# Patient Record
Sex: MALE | Race: WHITE | NOT HISPANIC OR LATINO | Employment: FULL TIME | URBAN - METROPOLITAN AREA
[De-identification: names, ages, dates, MRNs, and addresses within clinical notes are randomized per-mention and may not be internally consistent; named-entity substitution may affect disease eponyms.]

---

## 2017-01-18 ENCOUNTER — MYC MEDICAL ADVICE (OUTPATIENT)
Dept: FAMILY MEDICINE | Facility: CLINIC | Age: 32
End: 2017-01-18

## 2017-01-18 ENCOUNTER — OFFICE VISIT (OUTPATIENT)
Dept: FAMILY MEDICINE | Facility: CLINIC | Age: 32
End: 2017-01-18
Payer: COMMERCIAL

## 2017-01-18 ENCOUNTER — RADIANT APPOINTMENT (OUTPATIENT)
Dept: GENERAL RADIOLOGY | Facility: CLINIC | Age: 32
End: 2017-01-18
Attending: FAMILY MEDICINE
Payer: COMMERCIAL

## 2017-01-18 VITALS
HEART RATE: 91 BPM | RESPIRATION RATE: 14 BRPM | TEMPERATURE: 98.8 F | SYSTOLIC BLOOD PRESSURE: 128 MMHG | BODY MASS INDEX: 36.44 KG/M2 | WEIGHT: 269 LBS | DIASTOLIC BLOOD PRESSURE: 78 MMHG | OXYGEN SATURATION: 99 % | HEIGHT: 72 IN

## 2017-01-18 DIAGNOSIS — M25.531 RIGHT WRIST PAIN: ICD-10-CM

## 2017-01-18 DIAGNOSIS — Z23 NEED FOR INFLUENZA VACCINATION: Primary | ICD-10-CM

## 2017-01-18 PROCEDURE — 99214 OFFICE O/P EST MOD 30 MIN: CPT | Performed by: FAMILY MEDICINE

## 2017-01-18 PROCEDURE — 73110 X-RAY EXAM OF WRIST: CPT | Mod: RT

## 2017-01-18 NOTE — NURSING NOTE
Chief Complaint   Patient presents with     Musculoskeletal Problem     RIGHT WRIST PAIN       Initial /78 mmHg  Pulse 91  Temp(Src) 98.8  F (37.1  C) (Oral)  Resp 14  Ht 6' (1.829 m)  Wt 269 lb (122.018 kg)  BMI 36.48 kg/m2  SpO2 99% Estimated body mass index is 36.48 kg/(m^2) as calculated from the following:    Height as of this encounter: 6' (1.829 m).    Weight as of this encounter: 269 lb (122.018 kg).  BP completed using cuff size: large    Health Maintenance that is potentially due pending provider review:  Health Maintenance Due   Topic Date Due     INFLUENZA VACCINE (SYSTEM ASSIGNED)  09/01/2016         Vaccine today-ordered

## 2017-01-18 NOTE — PROGRESS NOTES
SUBJECTIVE:                                                    Eliezer Ellis is a 31 year old male who presents to clinic today for the following health issues:      Musculoskeletal problem/pain      Duration: started on Sat-1-14-17    Description  Location: right side of right wrist    Intensity:  Moderate in certain position    Accompanying signs and symptoms: tingling and swelling    History  Previous similar problem: no   Previous evaluation:  none    Precipitating or alleviating factors:  Trauma or overuse: YES- fall on 1-14-17 and then re-injured unloading firewood from truck on Mon. 1-16-17.  Aggravating factors include: lifting and turning sideways (opening jar, turning car key, zippering jacket, worse with resistance or weight bearing)    Therapies tried and outcome: rest/inactivity, ice, support wrap and NSAID - Ibuprofen           Problem list and histories reviewed & adjusted, as indicated.  Additional history: as documented    Patient Active Problem List   Diagnosis     Seasonal allergies     Onychomycosis     Obesity     CARDIOVASCULAR SCREENING; LDL GOAL LESS THAN 160     Prehypertension     Anxiety attack     Anxiety     Mixed hyperlipidemia     History reviewed. No pertinent past surgical history.    Social History   Substance Use Topics     Smoking status: Never Smoker      Smokeless tobacco: Never Used     Alcohol Use: 0.0 oz/week     0 Standard drinks or equivalent per week      Comment: 2-3x week     Family History   Problem Relation Age of Onset     Blood Disease Father 65     VTE, on coumadin     Family History Negative Mother          Current Outpatient Prescriptions   Medication Sig Dispense Refill     B Complex Vitamins (VITAMIN B COMPLEX PO) Take by mouth daily       Multiple Vitamins-Minerals (MULTIVITAMIN ADULT PO) Take by mouth daily       ALPRAZolam (XANAX) 1 MG tablet Take 1 tablet (1 mg) by mouth once as needed for anxiety 15 tablet 0     TGT PSYLLIUM FIBER PO Take by mouth daily 5  tablets       VITAMIN D PO Take  by mouth.       cetirizine (ZYRTEC) 10 MG tablet Take 10 mg by mouth daily.       venlafaxine (EFFEXOR-XR) 75 MG 24 hr capsule Take 1 per day for 1-2 weeks, then increase to 2 per day as tolerated 60 capsule 5     Allergies   Allergen Reactions     Penicillins      Recent Labs   Lab Test  08/29/16   1126  05/11/16   1511  01/10/14   1027  11/01/13   1109   LDL   --    --   159*  186*   HDL   --    --   34*  37*   TRIG   --    --   283*  151*   ALT  45  57   --    --    CR  0.95  1.03   --    --    GFRESTIMATED  >90  Non  GFR Calc    83   --    --    GFRESTBLACK  >90   GFR Calc    >90   GFR Calc     --    --    POTASSIUM  4.3  4.3   --    --    TSH  6.17*  7.81*   --    --       BP Readings from Last 3 Encounters:   01/18/17 128/78   08/29/16 124/82   06/15/16 128/80    Wt Readings from Last 3 Encounters:   01/18/17 269 lb (122.018 kg)   08/29/16 256 lb 8 oz (116.348 kg)   06/15/16 257 lb 8 oz (116.801 kg)                  Labs reviewed in EPIC  Problem list, Medication list, Allergies, and Medical/Social/Surgical histories reviewed in [facility] and updated as appropriate.    ROS:  Constitutional, HEENT, cardiovascular, pulmonary, GI, , musculoskeletal, neuro, skin, endocrine and psych systems are negative, except as otherwise noted.    OBJECTIVE:                                                    /78 mmHg  Pulse 91  Temp(Src) 98.8  F (37.1  C) (Oral)  Resp 14  Ht 6' (1.829 m)  Wt 269 lb (122.018 kg)  BMI 36.48 kg/m2  SpO2 99%  Body mass index is 36.48 kg/(m^2).  GENERAL: healthy, alert and no distress  ABDOMEN: soft, nontender, no hepatosplenomegaly, no masses and bowel sounds normal  MS: no gross musculoskeletal defects noted, no edema EXCEPT there is some edema in the right wrist , no bruises , no erythema and full ROM , sensation and pulses are equal and good concha     Diagnostic Test Results:  Xray - right wrsit       ASSESSMENT/PLAN:                                                        1. Right wrist pain  I do not see any fractures per my reading of the X ray, discussed with the pt to rest, ice a few x a day for 15 min time , NSAIDs for the pain, also avoid strenuous activities with that hand/arm/wrist, use a wrist brace during the day ,   - XR Wrist Right G/E 3 Views; Future  RTC if no improving or worsening.      Noreen Ramos MD  [facility]  '  HPI      ROS      Physical Exam

## 2017-01-21 ENCOUNTER — RADIANT APPOINTMENT (OUTPATIENT)
Dept: GENERAL RADIOLOGY | Facility: CLINIC | Age: 32
End: 2017-01-21
Attending: FAMILY MEDICINE
Payer: COMMERCIAL

## 2017-01-21 ENCOUNTER — OFFICE VISIT (OUTPATIENT)
Dept: URGENT CARE | Facility: URGENT CARE | Age: 32
End: 2017-01-21
Payer: COMMERCIAL

## 2017-01-21 VITALS
OXYGEN SATURATION: 99 % | SYSTOLIC BLOOD PRESSURE: 126 MMHG | HEIGHT: 72 IN | HEART RATE: 78 BPM | WEIGHT: 260 LBS | BODY MASS INDEX: 35.21 KG/M2 | TEMPERATURE: 98.1 F | DIASTOLIC BLOOD PRESSURE: 83 MMHG

## 2017-01-21 DIAGNOSIS — M10.9 ACUTE GOUTY ARTHROPATHY: ICD-10-CM

## 2017-01-21 DIAGNOSIS — S99.921A FOOT INJURY, RIGHT, INITIAL ENCOUNTER: Primary | ICD-10-CM

## 2017-01-21 DIAGNOSIS — S99.921A FOOT INJURY, RIGHT, INITIAL ENCOUNTER: ICD-10-CM

## 2017-01-21 PROCEDURE — 73630 X-RAY EXAM OF FOOT: CPT | Mod: RT

## 2017-01-21 PROCEDURE — 99213 OFFICE O/P EST LOW 20 MIN: CPT | Performed by: FAMILY MEDICINE

## 2017-01-21 RX ORDER — INDOMETHACIN 25 MG/1
50 CAPSULE ORAL
Qty: 40 CAPSULE | Refills: 1 | Status: SHIPPED | OUTPATIENT
Start: 2017-01-21 | End: 2018-03-15

## 2017-01-21 NOTE — MR AVS SNAPSHOT
After Visit Summary   1/21/2017    Eliezer Ellis    MRN: [id number]           Patient Information     Date Of Birth          1985        Visit Information        Provider Department      1/21/2017 2:00 PM Josi Mehta MD [facility]        Today's Diagnoses     Foot injury, right, initial encounter    -  1     Acute gouty arthropathy           Care Instructions      Eating to Prevent Gout  Gout is a painful form of arthritis caused by an excess of uric acid. This is a waste product made by the body. It builds up in the body and forms crystals that collect in the joints, bringing on a gout attack. Alcohol and certain foods can trigger a gout attack. Below are some guidelines for changing your diet to help you manage gout. Your healthcare provider can work with you to determine the best eating plan for you. Know that diet is only one part of managing gout. Take your medicines as prescribed and follow the other guidelines your healthcare provider has given you.  Foods to limit  Eating too many foods containing purines may increase the levels of uric acid in your body and increase your risk for a gout attack. It may be best to limit these high-purine foods:    Alcohol (beer, red wine). You may be told to avoid alcohol completely.    Certain fish (anchovies, sardines, fish roes, herring, tuna, mussels, codfish, scallops, trout, and lucie)    Certain meats (red meat, processed meat, fuentes, turkey, wild game, and goose)    Sauces and gravies made with meat    Organ meats (such as liver, kidneys, sweetbreads, and tripe)    Legumes (such as dried beans, peas)    Mushrooms, spinach, asparagus, and cauliflower    Yeast and yeast extract supplements  Foods to try  Some foods may be helpful for people with gout. You may want to try adding some of the following foods to your diet:    Dark berries: These include blueberries, blackberries, and cherries. These berries contain chemicals  that may lower uric acid.    Tofu: Tofu, which is made from soy, is a good source of protein. Studies have shown that it may be a better choice than meat for people with gout.    Omega fatty acids: These acids are found in fatty fish (such as salmon), certain oils (such as flax, olive, or nut oils), or nuts. They may help prevent inflammation due to gout.  The following guidelines are recommended by the American Medical Association for people with gout. Your diet should be:    High in fiber, whole grains, fruits, and vegetables.    Low in protein (15% of calories should come from protein. Choose lean sources such as soy, lean meats, and poultry).    Low in fat (no more than 30% of calories should come from fat, with only 10% coming from animal fat).     7438-6514 The [employer]. [address] [place] [address]. All rights reserved. This information is not intended as a substitute for professional medical care. Always follow your healthcare professional's instructions.        Gout Diet  Gout is a painful condition caused by an excess of uric acid, a waste product made by the body. Uric acid forms crystals that collect in the joints. This brings on symptoms of joint pain and swelling. This is called a gout attack. Often, medications and diet changes are combined to manage gout. Below are some guidelines for changing your diet to help you manage gout and prevent attacks. Your health care provider will help you determine the best eating plan for you.     Limiting or avoiding certain foods can help prevent gout attacks.   Eating to manage gout  Weight loss for those who are overweight may help reduce gout attacks.  Eat less of these foods  Eating too many foods containing purines may raise the levels of uric acid in your body. This raises your risk for a gout attack. Try to limit these foods and drinks:    Alcohol, such as beer and red wine. You may be told to avoid alcohol completely.    Soft drinks  that contain sugar or high fructose corn syrup    Certain fish, including anchovies, sardines, fish eggs, and herring    Certain meats, such as red meat, hot dogs, luncheon meats, and turkey    Organ meats, such as liver, kidneys, and sweetbreads    Legumes, such as dried beans and peas    Mushrooms, spinach, asparagus, and cauliflower    Other high fat foods such as gravy, whole milk, and high fat cheeses  Eat more of these foods  Other foods may be helpful for people with gout. Add some of these foods to your diet:    Dark berries, such as blueberries, blackberries, and cherries. These contain chemicals that may lower uric acid.    Tofu, a source of protein made from soy. Studies have shown that it may be a better choice than meat for people with gout.    Omega fatty acids. These are found in some fatty fish such as salmon, certain oils (flax, olive, or nut), and nuts themselves. Omega fatty acids may help prevent inflammation due to gout.    Dairy products that are low-fat or fat-free, such as cheese and yogurt    Complex carbohydrate foods, including whole grains, brown rice, oats, and beans    Coffee, in moderation  Follow-up care  Follow up with your health care provider, or as advised.  When to seek medical advice  Call your health care provider right away if any of these occur:    Return of gout symptoms, usually at night:    Severe pain, swelling, and heat in a joint, especially the base of the big toe    Affected joint is hard to move    Skin of the affected joint is purple or red    Fever of 100.4 F (38 C) or higher    Pain that doesn't get better even with prescribed medicine     2395-6459 The [employer]. [address] [place] [address]. All rights reserved. This information is not intended as a substitute for professional medical care. Always follow your healthcare professional's instructions.        Gout    Gout or is an inflammation of a joint due to a build-up of gout crystals in  the joint fluid. This occurs when there is an excess of uric acid (a normal waste product) in the body. Uric acid builds up in the body when the kidneys are unable to filter enough of it from the blood. This may occur with age. It is also associated with kidney disease. Gout occurs more often in persons with obesity, diabetes, hypertension, or high levels of fats in the blood. It may be run in families. Gout tends to come and go. A flare up of gout is called an attack. Drinking alcohol or eating certain foods (such as shellfish or foods with additives such as high-fructose corn syrup) may increase uric acid levels in the blood and cause a gout attack.  During a gout attack, the affected joint may become a hot, red, swollen and painful. If you have had one attack of gout, you are likely to have another. An attack of gout can be treated with medicine. If these attacks become frequent, a daily medicine may be prescribed to help the kidneys remove uric acid from the body.  Home care  During a gout attack:    Rest painful joints. If gout affects the joints of your foot or leg, you may want to use crutches for the first few days to keep from bearing weight on the affected joint.    When sitting or lying down, raise the painful joint to a level higher than your heart.    Apply an ice pack (ice cubes in a plastic bag wrapped in a thin towel) over the injured area for 20 minutes every 1-2 hours the first day for pain relief. Continue this 3-4 times a day for swelling and pain.    Avoid alcohol and foods listed below (see Preventing attacks) during a gout attack. Drink extra fluid to help flush the uric acid through your kidneys.    If you were prescribed a medication to treat gout, take it as your healthcare provider has instructed. Don't skip doses.    Take anti-inflammatory medicine as directed.     If pain medicines have been prescribed, take them exactly as directed.    Preventing attacks    Minimize or  avoid alcohol use. Excess alcohol intake can cause a gout attack.    Limit these foods and beverages:    Organ meats, such as kidneys and liver    Certain seafoods (anchovies, sardines, shrimp, scallops, herring, mackerel)    Wild game, meat extracts and meat gravies    Foods and beverages sweetened with high-fructose corn syrup, such as sodas    Eat a healthy diet including low-fat and nonfat dairy, whole grains, and vegetables.    If you are overweight, talk to your healthcare provider about a weight reduction plan. Avoid fasting or extreme low calorie diets (less than 900 calories per day). This will increase uric acid levels in the body.    If you have diabetes or high blood pressure, work with your doctor to manage these conditions.    Protect the joint from injury. Trauma can trigger a gout attack.  Follow-up care  Follow up with your healthcare provider or as advised.   When to seek medical advice  Call your healthcare provider if you have any of the following:    Fever over 100.4 F (38. C) with worsening joint pain    Increasing redness around the joint    Pain developing in another joint    Repeated vomiting, abdominal pain, or blood in the vomit or stool (black or red color)    0598-2107 [employer]. [address] [place] [address]. All rights reserved. This information is not intended as a substitute for professional medical care. Always follow your healthcare professional's instructions.              Follow-ups after your visit        Who to contact     If you have questions or need follow up information about today's clinic visit or your schedule please contact [facility] directly at [phone].  Normal or non-critical lab and imaging results will be communicated to you by MyChart, letter or phone within 4 business days after the clinic has received the results. If you do not hear from us within 7 days, please contact the clinic through MyChart or phone.  If you have a critical or abnormal lab result, we will notify you by phone as soon as possible.  Submit refill requests through [employer] or call your pharmacy and they will forward the refill request to us. Please allow 3 business days for your refill to be completed.          Additional Information About Your Visit        [employer] Information     [employer] gives you secure access to your electronic health record. If you see a primary care provider, you can also send messages to your care team and make appointments. If you have questions, please call your primary care clinic.  If you do not have a primary care provider, please call [phone] and they will assist you.        Care EveryWhere ID     This is your Care EveryWhere ID. This could be used by other organizations to access your [facility] medical records  [id number]        Your Vitals Were     Pulse Temperature Height BMI (Body Mass Index) Pulse Oximetry       78 98.1  F (36.7  C) (Tympanic) 6' (1.829 m) 35.25 kg/m2 99%        Blood Pressure from Last 3 Encounters:   01/21/17 126/83   01/18/17 128/78   08/29/16 124/82    Weight from Last 3 Encounters:   01/21/17 260 lb (117.935 kg)   01/18/17 269 lb (122.018 kg)   08/29/16 256 lb 8 oz (116.348 kg)                 Today's Medication Changes          These changes are accurate as of: 1/21/17  4:18 PM.  If you have any questions, ask your nurse or doctor.               Start taking these medicines.        Dose/Directions    indomethacin 25 MG capsule   Commonly known as:  INDOCIN   Used for:  Acute gouty arthropathy   Started by:  Josi Mehta MD        Dose:  50 mg   Take 2 capsules (50 mg) by mouth 3 times daily (with meals)   Quantity:  40 capsule   Refills:  1            Where to get your medicines      These medications were sent to [facility] [address] [address] [address]     Phone:  [phone]    - indomethacin 25 MG  capsule             Primary Care Provider Office Phone # Fax #    Ursula Mirlande Huang [phone]       [address] [address]        Thank you!     Thank you for choosing [facility]  for your care. Our goal is always to provide you with excellent care. Hearing back from our patients is one way we can continue to improve our services. Please take a few minutes to complete the written survey that you may receive in the mail after your visit with us. Thank you!             Your Updated Medication List - Protect others around you: Learn how to safely use, store and throw away your medicines at www.disposemymeds.org.          This list is accurate as of: 1/21/17  4:18 PM.  Always use your most recent med list.                   Brand Name Dispense Instructions for use    ALPRAZolam 1 MG tablet    XANAX    15 tablet    Take 1 tablet (1 mg) by mouth once as needed for anxiety       indomethacin 25 MG capsule    INDOCIN    40 capsule    Take 2 capsules (50 mg) by mouth 3 times daily (with meals)       MULTIVITAMIN ADULT PO      Take by mouth daily       TGT PSYLLIUM FIBER PO      Take by mouth daily 5 tablets       venlafaxine 75 MG 24 hr capsule    EFFEXOR-XR    60 capsule    Take 1 per day for 1-2 weeks, then increase to 2 per day as tolerated       VITAMIN B COMPLEX PO      Take by mouth daily       VITAMIN D PO      Take  by mouth.       ZYRTEC 10 MG tablet   Generic drug:  cetirizine      Take 10 mg by mouth daily.

## 2017-01-21 NOTE — NURSING NOTE
Chief Complaint   Patient presents with     Urgent Care     Toenail     c/o swollen and painful toe for 3 days       Initial /83 mmHg  Pulse 78  Temp(Src) 98.1  F (36.7  C) (Tympanic)  Ht 6' (1.829 m)  Wt 260 lb (117.935 kg)  BMI 35.25 kg/m2  SpO2 99% Estimated body mass index is 35.25 kg/(m^2) as calculated from the following:    Height as of this encounter: 6' (1.829 m).    Weight as of this encounter: 260 lb (117.935 kg).  BP completed using cuff size: regular  Peg Bedoya MA

## 2017-01-21 NOTE — PATIENT INSTRUCTIONS
Eating to Prevent Gout  Gout is a painful form of arthritis caused by an excess of uric acid. This is a waste product made by the body. It builds up in the body and forms crystals that collect in the joints, bringing on a gout attack. Alcohol and certain foods can trigger a gout attack. Below are some guidelines for changing your diet to help you manage gout. Your healthcare provider can work with you to determine the best eating plan for you. Know that diet is only one part of managing gout. Take your medicines as prescribed and follow the other guidelines your healthcare provider has given you.  Foods to limit  Eating too many foods containing purines may increase the levels of uric acid in your body and increase your risk for a gout attack. It may be best to limit these high-purine foods:    Alcohol (beer, red wine). You may be told to avoid alcohol completely.    Certain fish (anchovies, sardines, fish roes, herring, tuna, mussels, codfish, scallops, trout, and lucie)    Certain meats (red meat, processed meat, fuentes, turkey, wild game, and goose)    Sauces and gravies made with meat    Organ meats (such as liver, kidneys, sweetbreads, and tripe)    Legumes (such as dried beans, peas)    Mushrooms, spinach, asparagus, and cauliflower    Yeast and yeast extract supplements  Foods to try  Some foods may be helpful for people with gout. You may want to try adding some of the following foods to your diet:    Dark berries: These include blueberries, blackberries, and cherries. These berries contain chemicals that may lower uric acid.    Tofu: Tofu, which is made from soy, is a good source of protein. Studies have shown that it may be a better choice than meat for people with gout.    Omega fatty acids: These acids are found in fatty fish (such as salmon), certain oils (such as flax, olive, or nut oils), or nuts. They may help prevent inflammation due to gout.  The following guidelines are recommended by the  American Medical Association for people with gout. Your diet should be:    High in fiber, whole grains, fruits, and vegetables.    Low in protein (15% of calories should come from protein. Choose lean sources such as soy, lean meats, and poultry).    Low in fat (no more than 30% of calories should come from fat, with only 10% coming from animal fat).     6191-0892 [employer]. [address] [place] [address], [place] [address]. All rights reserved. This information is not intended as a substitute for professional medical care. Always follow your healthcare professional's instructions.        Gout Diet  Gout is a painful condition caused by an excess of uric acid, a waste product made by the body. Uric acid forms crystals that collect in the joints. This brings on symptoms of joint pain and swelling. This is called a gout attack. Often, medications and diet changes are combined to manage gout. Below are some guidelines for changing your diet to help you manage gout and prevent attacks. Your health care provider will help you determine the best eating plan for you.     Limiting or avoiding certain foods can help prevent gout attacks.   Eating to manage gout  Weight loss for those who are overweight may help reduce gout attacks.  Eat less of these foods  Eating too many foods containing purines may raise the levels of uric acid in your body. This raises your risk for a gout attack. Try to limit these foods and drinks:    Alcohol, such as beer and red wine. You may be told to avoid alcohol completely.    Soft drinks that contain sugar or high fructose corn syrup    Certain fish, including anchovies, sardines, fish eggs, and herring    Certain meats, such as red meat, hot dogs, luncheon meats, and turkey    Organ meats, such as liver, kidneys, and sweetbreads    Legumes, such as dried beans and peas    Mushrooms, spinach, asparagus, and cauliflower    Other high fat foods such as gravy, whole milk, and high fat  cheeses  Eat more of these foods  Other foods may be helpful for people with gout. Add some of these foods to your diet:    Dark berries, such as blueberries, blackberries, and cherries. These contain chemicals that may lower uric acid.    Tofu, a source of protein made from soy. Studies have shown that it may be a better choice than meat for people with gout.    Omega fatty acids. These are found in some fatty fish such as salmon, certain oils (flax, olive, or nut), and nuts themselves. Omega fatty acids may help prevent inflammation due to gout.    Dairy products that are low-fat or fat-free, such as cheese and yogurt    Complex carbohydrate foods, including whole grains, brown rice, oats, and beans    Coffee, in moderation  Follow-up care  Follow up with your health care provider, or as advised.  When to seek medical advice  Call your health care provider right away if any of these occur:    Return of gout symptoms, usually at night:    Severe pain, swelling, and heat in a joint, especially the base of the big toe    Affected joint is hard to move    Skin of the affected joint is purple or red    Fever of 100.4 F (38 C) or higher    Pain that doesn't get better even with prescribed medicine     3164-1844 [email or web address]. [address] [place] [address], [place] [address]. All rights reserved. This information is not intended as a substitute for professional medical care. Always follow your healthcare professional's instructions.        Gout    Gout or is an inflammation of a joint due to a build-up of gout crystals in the joint fluid. This occurs when there is an excess of uric acid (a normal waste product) in the body. Uric acid builds up in the body when the kidneys are unable to filter enough of it from the blood. This may occur with age. It is also associated with kidney disease. Gout occurs more often in persons with obesity, diabetes, hypertension, or high levels of fats in the blood. It may be run in  families. Gout tends to come and go. A flare up of gout is called an attack. Drinking alcohol or eating certain foods (such as shellfish or foods with additives such as high-fructose corn syrup) may increase uric acid levels in the blood and cause a gout attack.  During a gout attack, the affected joint may become a hot, red, swollen and painful. If you have had one attack of gout, you are likely to have another. An attack of gout can be treated with medicine. If these attacks become frequent, a daily medicine may be prescribed to help the kidneys remove uric acid from the body.  Home care  During a gout attack:    Rest painful joints. If gout affects the joints of your foot or leg, you may want to use crutches for the first few days to keep from bearing weight on the affected joint.    When sitting or lying down, raise the painful joint to a level higher than your heart.    Apply an ice pack (ice cubes in a plastic bag wrapped in a thin towel) over the injured area for 20 minutes every 1-2 hours the first day for pain relief. Continue this 3-4 times a day for swelling and pain.    Avoid alcohol and foods listed below (see Preventing attacks) during a gout attack. Drink extra fluid to help flush the uric acid through your kidneys.    If you were prescribed a medication to treat gout, take it as your healthcare provider has instructed. Don't skip doses.    Take anti-inflammatory medicine as directed.     If pain medicines have been prescribed, take them exactly as directed.    Preventing attacks    Minimize or avoid alcohol use. Excess alcohol intake can cause a gout attack.    Limit these foods and beverages:    Organ meats, such as kidneys and liver    Certain seafoods (anchovies, sardines, shrimp, scallops, herring, mackerel)    Wild game, meat extracts and meat gravies    Foods and beverages sweetened with high-fructose corn syrup, such as sodas    Eat a healthy diet including low-fat and nonfat dairy, whole  grains, and vegetables.    If you are overweight, talk to your healthcare provider about a weight reduction plan. Avoid fasting or extreme low calorie diets (less than 900 calories per day). This will increase uric acid levels in the body.    If you have diabetes or high blood pressure, work with your doctor to manage these conditions.    Protect the joint from injury. Trauma can trigger a gout attack.  Follow-up care  Follow up with your healthcare provider or as advised.   When to seek medical advice  Call your healthcare provider if you have any of the following:    Fever over 100.4 F (38. C) with worsening joint pain    Increasing redness around the joint    Pain developing in another joint    Repeated vomiting, abdominal pain, or blood in the vomit or stool (black or red color)    3903-7369 [employer]. [address] [place] [address], [place] [address]. All rights reserved. This information is not intended as a substitute for professional medical care. Always follow your healthcare professional's instructions.

## 2017-01-22 NOTE — PROGRESS NOTES
SUBJECTIVE:   Eliezer Ellis is a 31 year old male presenting with right foot pain for 3 days, worse with weight bearing. Pt states that he fell about 4 days ago wondering if he kicked right foot against something. Pt denies other symptoms such as numbness or weakness, fever, chills, night sweats.     Past Medical History   Diagnosis Date     Intermittent asthma 9/29/2011     reports no active problems     Seasonal allergies 9/29/2011     Obesity      Anxiety      Anxiety attack 11/1/2013     Anxiety 11/1/2013     Hyperlipidemia LDL goal <160 11/1/2013      No past surgical history on file.     Current Outpatient Prescriptions   Medication Sig Dispense Refill             venlafaxine (EFFEXOR-XR) 75 MG 24 hr capsule Take 1 per day for 1-2 weeks, then increase to 2 per day as tolerated 60 capsule 5     B Complex Vitamins (VITAMIN B COMPLEX PO) Take by mouth daily       Multiple Vitamins-Minerals (MULTIVITAMIN ADULT PO) Take by mouth daily       ALPRAZolam (XANAX) 1 MG tablet Take 1 tablet (1 mg) by mouth once as needed for anxiety 15 tablet 0     TGT PSYLLIUM FIBER PO Take by mouth daily 5 tablets       VITAMIN D PO Take  by mouth.       cetirizine (ZYRTEC) 10 MG tablet Take 10 mg by mouth daily.          OBJECTIVE:   /83 mmHg  Pulse 78  Temp(Src) 98.1  F (36.7  C) (Tympanic)  Ht 6' (1.829 m)  Wt 260 lb (117.935 kg)  BMI 35.25 kg/m2  SpO2 99%       Pt appears in NAD.   Lower extremities : both feet symmetrical without achymosis, erythema or deformity, tenderness ans minimal swelling noted near right first MTJ with painful movements, No erythema, swelling or tenderness of both calves. Pedal pulses strong.   X-ray of right foot : normal .     Assessment/Plan:   (S99.921A) Foot injury, right, initial encounter  (primary encounter diagnosis)  Comment:   Plan: XR Foot Right G/E 3 Views            (M10.9) Acute gouty arthropathy  Comment: pt's symptoms may be caused by gout. Will start the treatment with NSAID and  continue supportive care: rest, fluids and elevation. Pt is advised to follow up with his PCP in a week after coming in to have a blood test checking for uric acid. Education materials about gout are given to pt. Pt agrees with plan.   Plan: indomethacin (INDOCIN) 25 MG capsule, Uric acid

## 2017-07-10 ENCOUNTER — OFFICE VISIT (OUTPATIENT)
Dept: FAMILY MEDICINE | Facility: CLINIC | Age: 32
End: 2017-07-10
Payer: COMMERCIAL

## 2017-07-10 VITALS
HEART RATE: 85 BPM | OXYGEN SATURATION: 98 % | BODY MASS INDEX: 35.49 KG/M2 | SYSTOLIC BLOOD PRESSURE: 127 MMHG | TEMPERATURE: 97.3 F | DIASTOLIC BLOOD PRESSURE: 85 MMHG | HEIGHT: 72 IN | WEIGHT: 262 LBS

## 2017-07-10 DIAGNOSIS — M1A.4720 CHRONIC GOUT DUE TO OTHER SECONDARY CAUSE INVOLVING TOE OF LEFT FOOT WITHOUT TOPHUS: Primary | ICD-10-CM

## 2017-07-10 DIAGNOSIS — F41.9 ANXIETY: ICD-10-CM

## 2017-07-10 DIAGNOSIS — F41.0 PANIC DISORDER WITHOUT AGORAPHOBIA: ICD-10-CM

## 2017-07-10 PROCEDURE — 84550 ASSAY OF BLOOD/URIC ACID: CPT | Performed by: FAMILY MEDICINE

## 2017-07-10 PROCEDURE — 80053 COMPREHEN METABOLIC PANEL: CPT | Performed by: FAMILY MEDICINE

## 2017-07-10 PROCEDURE — 99214 OFFICE O/P EST MOD 30 MIN: CPT | Performed by: FAMILY MEDICINE

## 2017-07-10 PROCEDURE — 36415 COLL VENOUS BLD VENIPUNCTURE: CPT | Performed by: FAMILY MEDICINE

## 2017-07-10 PROCEDURE — 82977 ASSAY OF GGT: CPT | Performed by: FAMILY MEDICINE

## 2017-07-10 RX ORDER — ALLOPURINOL 100 MG/1
100 TABLET ORAL DAILY
Qty: 30 TABLET | Refills: 11 | Status: SHIPPED | OUTPATIENT
Start: 2017-07-10 | End: 2018-08-04

## 2017-07-10 RX ORDER — INDOMETHACIN 50 MG/1
50 CAPSULE ORAL
Qty: 30 CAPSULE | Refills: 3 | Status: SHIPPED | OUTPATIENT
Start: 2017-07-10 | End: 2018-09-27

## 2017-07-10 NOTE — PROGRESS NOTES
SUBJECTIVE:                                                    Eliezer Ellis is a 32 year old male who presents to clinic today for the following health issues:      Gout  Duration: january  and last one  3 weeks ago,     This time not  as painful  As 1st time in january   Description   Location: big toe - left  Joint Swelling: YES  Redness: YES  Pain intensity:  severe  Accompanying signs and symptoms: None  History  Previous history of gout: no    Trauma to the area: no   Precipitating factors:   Alcohol usage: Moderate  Diuretic use: drinks coffee daily 1-2 cups   Recent illness: no   Therapies tried and outcome:  Increased fluids  with tart cherry juice, RICE - , Rx indomethacin -effective     Alcohol consumption varies- 2 months abstinence completely after jan gouty attack.  After second attacks- in June      PROBLEMS TO ADD ON...    Problem list and histories reviewed & adjusted, as indicated.  Additional history: as documented    Labs reviewed in EPIC    Reviewed and updated as needed this visit by clinical staff  Tobacco  Allergies  Meds  Med Hx  Surg Hx  Fam Hx  Soc Hx      Reviewed and updated as needed this visit by Provider         ROS:  Constitutional, HEENT, cardiovascular, pulmonary, gi and gu systems are negative, except as otherwise noted.    OBJECTIVE:     /85  Pulse 85  Temp 97.3  F (36.3  C) (Oral)  Ht 6' (1.829 m)  Wt 262 lb (118.8 kg)  SpO2 98%  BMI 35.53 kg/m2  Body mass index is 35.53 kg/(m^2).  GENERAL: healthy, alert and no distress  NECK: no adenopathy, no asymmetry, masses, or scars and thyroid normal to palpation  RESP: lungs clear to auscultation - no rales, rhonchi or wheezes  CV: regular rate and rhythm, normal S1 S2, no S3 or S4, no murmur, click or rub, no peripheral edema and peripheral pulses strong  ABDOMEN: soft, nontender, no hepatosplenomegaly, no masses and bowel sounds normal  MS: no gross musculoskeletal defects noted, no edema    Diagnostic Test  Results:  No results found for this or any previous visit (from the past 24 hour(s)).    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 Not a long term daily medications   Counseling helps the most- she see Alondra Gonzalez- in private practice counseling  SSRI,SNRI- ineffective- not taking it further        Potential medication side effects were discussed with the patient; let me know if any occur.  The patient indicates understanding of these issues and agrees with the plan.                Ursula Huang MD  [facility]

## 2017-07-10 NOTE — MR AVS SNAPSHOT
After Visit Summary   7/10/2017    Eliezer Ellis    MRN: [id number]           Patient Information     Date Of Birth          1985        Visit Information        Provider Department      7/10/2017 9:45 AM Ursula Huang MD [facility]        Today's Diagnoses     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    -  1    Anxiety        Panic disorder without agoraphobia           Follow-ups after your visit        Who to contact     If you have questions or need follow up information about today's clinic visit or your schedule please contact [facility] directly at [phone].  Normal or non-critical lab and imaging results will be communicated to you by MyChart, letter or phone within 4 business days after the clinic has received the results. If you do not hear from us within 7 days, please contact the clinic through [employer] or phone. If you have a critical or abnormal lab result, we will notify you by phone as soon as possible.  Submit refill requests through [employer] or call your pharmacy and they will forward the refill request to us. Please allow 3 business days for your refill to be completed.          Additional Information About Your Visit        MyChart Information     [employer] gives you secure access to your electronic health record. If you see a primary care provider, you can also send messages to your care team and make appointments. If you have questions, please call your primary care clinic.  If you do not have a primary care provider, please call [phone] and they will assist you.        Care EveryWhere ID     This is your Care EveryWhere ID. This could be used by other organizations to access your [facility] medical records  [id number]        Your Vitals Were     Pulse Temperature Height Pulse Oximetry BMI (Body Mass Index)       85 97.3  F (36.3  C) (Oral) 6' (1.829 m) 98% 35.53 kg/m2        Blood Pressure from Last  3 Encounters:   07/10/17 127/85   01/21/17 126/83   01/18/17 128/78    Weight from Last 3 Encounters:   07/10/17 262 lb (118.8 kg)   01/21/17 260 lb (117.9 kg)   01/18/17 269 lb (122 kg)              We Performed the Following     Comprehensive metabolic panel     GGT     Uric acid          Today's Medication Changes          These changes are accurate as of: 7/10/17  6:14 PM.  If you have any questions, ask your nurse or doctor.               Start taking these medicines.        Dose/Directions    allopurinol 100 MG tablet   Commonly known as:  ZYLOPRIM   Used for:  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   Started by:  Ursula Huang MD        Dose:  100 mg   Take 1 tablet (100 mg) by mouth daily   Quantity:  30 tablet   Refills:  11         These medicines have changed or have updated prescriptions.        Dose/Directions    * indomethacin 25 MG capsule   Commonly known as:  INDOCIN   This may have changed:  Another medication with the same name was added. Make sure you understand how and when to take each.   Used for:  Acute gouty arthropathy   Changed by:  Josi Mehta MD        Dose:  50 mg   Take 2 capsules (50 mg) by mouth 3 times daily (with meals)   Quantity:  40 capsule   Refills:  1       * indomethacin 50 MG capsule   Commonly known as:  INDOCIN   This may have changed:  You were already taking a medication with the same name, and this prescription was added. Make sure you understand how and when to take each.   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 Read the directions carefully, and ask your doctor or other care provider to review  them with you.      Stop taking these medicines if you haven't already. Please contact your care team if you have questions.     venlafaxine 75 MG 24 hr capsule   Commonly known as:  EFFEXOR-XR   Stopped by:  Ursula Huang MD                Where to get your medicines      These medications were sent to [id number] - [address] AT [address] [address]     Phone:  [phone]     allopurinol 100 MG tablet    indomethacin 50 MG capsule                Primary Care Provider Office Phone # Fax #    Ursula Huang [phone]       [facility] [address] [facility] [address]        Equal Access to Services     BRUNA HORNE AH: Hadii snehal lee hadasho Soomaali, waaxda luqadaha, qaybta kaalmada adeegyada, waxkaitlynn chenin haydarrinn delmy brumfield . So [facility] [phone].    ATENCIÓN: Si habla español, tiene a giordano disposición servicios gratuitos de asistencia lingüística. Llame al [phone].    We comply with applicable federal civil rights laws and [place] laws. We do not discriminate on the basis of race, color, national origin, age, disability sex, sexual orientation or gender identity.            Thank you!     Thank you for choosing [facility]  for your care. Our goal is always to provide you with excellent care. Hearing back from our patients is one way we can continue to improve our services. Please take a few minutes to complete the written survey that you may receive in the mail after your visit with us. Thank you!             Your Updated Medication List - Protect others around you: Learn how to safely use, store and throw away your medicines at www.disposemymeds.org.          This list is accurate as of: 7/10/17  6:14 PM.  Always use your most recent med list.                   Brand Name Dispense Instructions for use Diagnosis    allopurinol 100 MG tablet    ZYLOPRIM    30 tablet     Take 1 tablet (100 mg) by mouth daily    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       ALPRAZolam 1 MG tablet    XANAX    15 tablet    Take 1 tablet (1 mg) by mouth once as needed for anxiety    Anxiety attack       * indomethacin 25 MG capsule    INDOCIN    40 capsule    Take 2 capsules (50 mg) by mouth 3 times daily (with meals)    Acute gouty arthropathy       * indomethacin 50 MG capsule    INDOCIN    30 capsule    Take 1 capsule (50 mg) by mouth 3 times daily (with meals)    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       MULTIVITAMIN ADULT PO      Take by mouth daily        TGT PSYLLIUM FIBER PO      Take by mouth daily 5 tablets        VITAMIN B COMPLEX PO      Take by mouth daily        VITAMIN D PO      Take  by mouth.        ZYRTEC 10 MG tablet   Generic drug:  cetirizine      Take 10 mg by mouth daily.        * Notice:  This list has 2 medication(s) that are the same as other medications prescribed for you. Read the directions carefully, and ask your doctor or other care provider to review them with you.

## 2017-07-10 NOTE — NURSING NOTE
Chief Complaint   Patient presents with     Arthritis     /85  Pulse 85  Temp 97.3  F (36.3  C) (Oral)  Ht 6' (1.829 m)  Wt 262 lb (118.8 kg)  SpO2 98%  BMI 35.53 kg/m2 Estimated body mass index is 35.53 kg/(m^2) as calculated from the following:    Height as of this encounter: 6' (1.829 m).    Weight as of this encounter: 262 lb (118.8 kg).  BP completed using cuff size: large       Health Maintenance due pending provider review:  NONE    n/a      Jocelin Narvaez CMA

## 2017-07-11 LAB
ALBUMIN SERPL-MCNC: 4.3 G/DL (ref 3.4–5)
ALP SERPL-CCNC: 88 U/L (ref 40–150)
ALT SERPL W P-5'-P-CCNC: 39 U/L (ref 0–70)
ANION GAP SERPL CALCULATED.3IONS-SCNC: 10 MMOL/L (ref 3–14)
AST SERPL W P-5'-P-CCNC: 20 U/L (ref 0–45)
BILIRUB SERPL-MCNC: 0.8 MG/DL (ref 0.2–1.3)
BUN SERPL-MCNC: 17 MG/DL (ref 7–30)
CALCIUM SERPL-MCNC: 9.3 MG/DL (ref 8.5–10.1)
CHLORIDE SERPL-SCNC: 107 MMOL/L (ref 94–109)
CO2 SERPL-SCNC: 25 MMOL/L (ref 20–32)
CREAT SERPL-MCNC: 0.81 MG/DL (ref 0.66–1.25)
GFR SERPL CREATININE-BSD FRML MDRD: ABNORMAL ML/MIN/1.7M2
GGT SERPL-CCNC: 78 U/L (ref 0–75)
GLUCOSE SERPL-MCNC: 113 MG/DL (ref 70–99)
POTASSIUM SERPL-SCNC: 4 MMOL/L (ref 3.4–5.3)
PROT SERPL-MCNC: 7.4 G/DL (ref 6.8–8.8)
SODIUM SERPL-SCNC: 142 MMOL/L (ref 133–144)
URATE SERPL-MCNC: 7.1 MG/DL (ref 3.5–7.2)

## 2017-07-12 NOTE — PROGRESS NOTES
Hi- one of the liver enzyme, GGT is still high. Its good if you can follow complete alcohol , jakob beer-abstinence for a few weeks.   We can recheck this enzyme in 3 months as lab only appointment , along with Uric acid  -Liver and gallbladder tests are normal. (ALT,AST, Alk phos, bilirubin), kidney function is normal (Cr, GFR), Sodium is normal, Potassium is normal, Calcium is normal, Glucose is normal (diabetes screening test).   -the uric acid is upper normal level. Given your history of 2 gout attacks,   -Ideally the uric acid should be between 5-6.  -Avoid high purine diet-  seafod, red meat, , turkey & wild game, organ meats- due to gout.  -Avoid alcohol, jakob beer, Avoid dehydration  -Start allopurinol 100 mg once daily , after your current summer classes, and at least 6-8 weeks after the last gout attack- that is no acute symptoms of gouty arthritis   -Do take over the counter naprosyn with meals, 1 tab once daily for a few weeks when starting allopurinol    Please keep us posted with questions or concerns .      Best Regards,    Ursula Huang MD  [facility]  [phone]

## 2018-03-15 ENCOUNTER — OFFICE VISIT (OUTPATIENT)
Dept: FAMILY MEDICINE | Facility: CLINIC | Age: 33
End: 2018-03-15
Payer: COMMERCIAL

## 2018-03-15 VITALS
DIASTOLIC BLOOD PRESSURE: 90 MMHG | TEMPERATURE: 99.3 F | OXYGEN SATURATION: 98 % | HEART RATE: 99 BPM | WEIGHT: 248 LBS | SYSTOLIC BLOOD PRESSURE: 137 MMHG | RESPIRATION RATE: 14 BRPM | HEIGHT: 72 IN | BODY MASS INDEX: 33.59 KG/M2

## 2018-03-15 DIAGNOSIS — R03.0 ELEVATED BLOOD PRESSURE READING WITHOUT DIAGNOSIS OF HYPERTENSION: ICD-10-CM

## 2018-03-15 DIAGNOSIS — G44.229 CHRONIC TENSION-TYPE HEADACHE, NOT INTRACTABLE: Primary | ICD-10-CM

## 2018-03-15 DIAGNOSIS — G89.29 CHRONIC LEFT SI JOINT PAIN: ICD-10-CM

## 2018-03-15 DIAGNOSIS — F41.0 ANXIETY ATTACK: ICD-10-CM

## 2018-03-15 DIAGNOSIS — F41.9 ANXIETY: ICD-10-CM

## 2018-03-15 DIAGNOSIS — M53.3 CHRONIC LEFT SI JOINT PAIN: ICD-10-CM

## 2018-03-15 PROCEDURE — 99214 OFFICE O/P EST MOD 30 MIN: CPT | Performed by: FAMILY MEDICINE

## 2018-03-15 NOTE — PROGRESS NOTES
SUBJECTIVE:   Eliezer Ellis is a 33 year old male who presents to clinic today for the following health issues:    1. 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 of above but a lot of computer work  Ibuprofen as needed - but almost daily now- usually helps and jakob if taken early  Concerned if that a sign of bigger problem inside the brain  No head injury, no loss of concisouness or balance, speech or gait abnormalities     2 On-going back pain in lower back (left side):almost pin pointing   intermittent, pinching type pain, feel it when move in  certain ways, bending, twisting, for a while- the pain may last for the whole day  It does get better over time  Has used foam roller that helps  Has occasionally taken ibuprofen   It keeps coming back  First therapeutic massage a week ago- felt better    3. Red circular area on side of tongue. Initially  thought it was developing into canker sore and applied  topical medication, but it has been 1.5 weeks with no  change. No pain, just kind of weird.    PROBLEMS TO ADD ON...    Problem list and histories reviewed & adjusted, as indicated.  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 No pertinent surgical history.    Social History   Substance Use Topics     Smoking status: Never Smoker     Smokeless tobacco: Never Used     Alcohol use 0.0 oz/week      0 Standard drinks or equivalent per week      Comment: 2-3x week     Family History   Problem Relation Age of Onset     Blood Disease Father 65     VTE, on coumadin     Family History Negative Mother            Reviewed and updated as needed this visit by clinical staff       Reviewed and updated as needed this visit by Provider         ROS:  Constitutional, HEENT, cardiovascular, pulmonary, gi and gu systems are negative, except as otherwise noted.    OBJECTIVE:     /90  Pulse 99  Temp 99.3  F (37.4  C) (Oral)  Resp 14  Ht 6' (1.829 m)  Wt 248 lb (112.5 kg)  SpO2 98%  BMI 33.63 kg/m2  Body mass index is 33.63 kg/(m^2).  GENERAL: healthy, alert and no distress  HENT: ear canals and TM's normal, nose and mouth without ulcers or lesions  NECK: no adenopathy, no asymmetry, masses, or scars and thyroid normal to palpation  RESP: lungs clear to auscultation - no rales, rhonchi or wheezes  CV: regular rate and rhythm, normal S1 S2, no S3 or S4, no murmur, click or rub, no peripheral edema and peripheral pulses strong  ABDOMEN: soft, nontender, no hepatosplenomegaly, no masses and bowel sounds normal  Lower back  Exam- loss of lumbar lordosis . Pin pointing spasm over right si joint. ROM is intact  Neuro: Cranial nerves and fundi are normal. VANE. EOM's intact. No papilledema. Neck supple. No bruits. Normal deep tendon reflexes.      ASSESSMENT/PLAN:   (G44.229) Chronic tension-type headache, not intractable  (primary encounter diagnosis)  Plan: Keep headache diary  Keep routine sleep pattern  Avoid extreme of emotions, over or under sleeping- jakob if found to be causing / association with headache  Avoid daily over the counter pain medications ok to take it for moderate headache   Start physical activity of choice  I do not believe it is migraine headache either.  I do recommend follow-up in 4 weeks to discuss frequency of headache and severity.  Earlier follow-up is recommended if more  concerns        (M53.3,  G89.29) Chronic left SI joint pain  Plan:start PHYSICAL THERAPY   ATTENTION TO THE POSTURE, DISCUSS IT FURTHER WITH PHYSICAL THERAPY.  FOLLOW-UP IN 4 WEEKS IF NOT BETTER [facility] HAND, AND CHIR[facility]RACTIC REFERRAL      (F41.9) Anxiety  Plan: Intolerance to a long list of antianxiety antidepressants.  He has not been in counseling and is encouraged to resume counseling      (R03.0) Elevated blood pressure reading without diagnosis of hypertension  Plan: order for DME  Monitor Blood pressure with own  Blood pressure monitor  2-3 times a week - when most relaxed - bring record for review  -Normal is 119/79 or lower  -Blood pressure between 120-139/80-89- elevated but without diagnoses of hypertension   -Hypertension is 140/90 or above and needs treatment with medication.  -Recheck  Blood pressure periodically,  if More than 140/90 Return visit with MD.   -to reach goal normal Blood pressure , follow:  -Progressive daily aerobic exercise program, most days of the week  -Follow a low fat, low cholesterol diet, attempt to lose weight    -Reduce salt in diet and cooking.  -Reduce coffee to 1-2 cups a day, less is more        Total time with patient today was 25 minutes, more than 15 minutes spent in counseling regarding interpretation of clinical signs and symptoms and going through differentials,additional diagnostic testing options,treatment plan and follow up recommendation.               Ursula Huang MD  [facility]

## 2018-03-15 NOTE — MR AVS SNAPSHOT
After Visit Summary   3/15/2018    Eliezer Ellis    MRN: [id number]           Patient Information     Date Of Birth          1985        Visit Information        Provider Department      3/15/2018 12:00 PM Ursula Huang MD [facility]        Today's Diagnoses     Chronic tension-type headache, not intractable    -  1    Chronic left SI joint pain        Anxiety        Elevated blood pressure reading without diagnosis of hypertension          Care Instructions    1. Chronic tension-type headache, not intractable  Keep headache diary  Keep routine sleep pattern  Avoid extreme of emotions, over or under sleeping- jakob if found to be causing / association with headache  Avoid daily over the counter pain medications ok to take it for moderate headache   Start physical activity of choice      2. Chronic left SI joint pain  PHYSICAL THERAPY [facility], [phone]   - [facility] PT, HAND, AND CHIROPRACTIC REFERRAL    3. Anxiety  Resume counseling      Monitor Blood pressure with own  Blood pressure monitor  2-3 times a week - when most relaxed - bring record for review  -Normal is 119/79 or lower  -Blood pressure between 120-139/80-89- elevated but without diagnoses of hypertension   -Hypertension is 140/90 or above and needs treatment with medication.  -Recheck  Blood pressure periodically,  if More than 140/90 Return visit with MD.   -to reach goal normal Blood pressure , follow:  -Progressive daily aerobic exercise program, most days of the week  -Follow a low fat, low cholesterol diet, attempt to lose weight    -Reduce salt in diet and cooking.  -Reduce coffee to 1-2 cups a day, less is more                    Follow-ups after your visit        Additional Services     [facility], HAND, AND CHIR[facility]RACTIC REFERRAL       **This order will print in the [facility] Scheduling Office**    Physical Therapy, Hand Therapy and Chiropractic Care are available through:    *[facility]  *[facility]  *[facility]    Call one number to schedule at any of the above locations: [phone].    Your provider has referred you to: Physical Therapy at [facility] or [facility]    Indication/Reason for Referral: Low Back Pain  Onset of Illness: years  Therapy Orders: Evaluate and Treat  Special Programs: None  Special Request: None    Alex Johnson      Additional Comments for the Therapist or Chiropractor:     Please be aware that coverage of these services is subject to the terms and limitations of your health insurance plan.  Call member services at your health plan with any benefit or coverage questions.      Please bring the following to your appointment:    *Your personal calendar for scheduling future appointments  *Comfortable clothing                  Who to contact     If you have questions or need follow up information about today's clinic visit or your schedule please contact [facility] directly at [phone].  Normal or non-critical lab and imaging results will be communicated to you by MyChart, letter or phone within 4 business days after the clinic has received the results. If you do not hear from us within 7 days, please contact the clinic through iwihart or phone. If you have a critical or abnormal lab result, we will notify you by phone as soon as possible.  Submit refill requests through [employer] or call your pharmacy and they will forward the refill request to us. Please allow 3 business days for your refill to be completed.          Additional Information About Your Visit        MyChart Information     [employer] gives you secure access to your electronic health record. If you see a primary care provider, you can also send messages to your care team and make appointments. If you have questions, please call your primary care clinic.  If you do not have a primary care provider, please call [phone] and they will assist you.        Care  EveryWhere ID     This is your Care EveryWhere ID. This could be used by other organizations to access your [facility] medical records  [id number]        Your Vitals Were     Pulse Temperature Respirations Height Pulse Oximetry BMI (Body Mass Index)    99 99.3  F (37.4  C) (Oral) 14 6' (1.829 m) 98% 33.63 kg/m2       Blood Pressure from Last 3 Encounters:   03/15/18 137/90   07/10/17 127/85   01/21/17 126/83    Weight from Last 3 Encounters:   03/15/18 248 lb (112.5 kg)   07/10/17 262 lb (118.8 kg)   01/21/17 260 lb (117.9 kg)              We Performed the Following     [facility], HAND, AND CHIR[facility]RACTIC REFERRAL          Today's Medication Changes          These changes are accurate as of 3/15/18  1:10 PM.  If you have any questions, ask your nurse or doctor.               Start taking these medicines.        Dose/Directions    order for DME   Used for:  Elevated blood pressure reading without diagnosis of hypertension   Started by:  Ursula Huang MD        Equipment being ordered: digital Blood pressure apparatus   Quantity:  1 Box   Refills:  0         These medicines have changed or have updated prescriptions.        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  Ask your nurse if you have questions.     Bring a paper prescription for each of these medications     order for DME                Primary Care Provider Office Phone # Fax #    Ursula Huang [phone] [address] [facility] [address]        Equal Access to Services     BRUNA NORRIS: Celina  snehal Mercado, wademetrida luqadaha, qaybta kaalmada amy, waxkaitlynn dafne jasminanitin newsomeinder cheryllexiejefe hardinZariaiona sarah. So [facility] [phone].    ATENCIÓN: Si habla español, tiene a giordano disposición servicios gratuitos de asistencia lingüística. Mckinleyame al [phone].    We comply with applicable federal civil rights laws and [place] laws. We do not discriminate on the basis of race, color, national origin, age, disability, sex, sexual orientation, or gender identity.            Thank you!     Thank you for choosing [facility]  for your care. Our goal is always to provide you with excellent care. Hearing back from our patients is one way we can continue to improve our services. Please take a few minutes to complete the written survey that you may receive in the mail after your visit with us. Thank you!             Your Updated Medication List - Protect others around you: Learn how to safely use, store and throw away your medicines at www.disposemymeds.org.          This list is accurate as of 3/15/18  1:10 PM.  Always use your most recent med list.                   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       MULTIVITAMIN ADULT PO      Take by mouth daily        order for DME     1 Box    Equipment being ordered: digital Blood pressure apparatus    Elevated blood pressure reading without diagnosis of hypertension       TGT PSYLLIUM FIBER PO      Take 1 chew tab by mouth daily Using 1 fiber  Gummi daily        VITAMIN B COMPLEX PO      Take by mouth daily        VITAMIN C PO      Take 2 chew tab by mouth daily Pt takes 2 Gummi tabs daily        VITAMIN D PO      Take 1,000 Units by mouth daily Pt takes 2 tabs daily in the AM        ZYRTEC 10 MG tablet   Generic drug:  cetirizine      Take 10 mg by mouth daily.

## 2018-03-15 NOTE — PATIENT INSTRUCTIONS
1. Chronic tension-type headache, not intractable  Keep headache diary  Keep routine sleep pattern  Avoid extreme of emotions, over or under sleeping- jakob if found to be causing / association with headache  Avoid daily over the counter pain medications ok to take it for moderate headache   Start physical activity of choice      2. Chronic left SI joint pain  PHYSICAL THERAPY [facility], [phone]   - [facility] HAND, AND CHIR[facility]RACTIC REFERRAL    3. Anxiety  Resume counseling      Monitor Blood pressure with own  Blood pressure monitor  2-3 times a week - when most relaxed - bring record for review  -Normal is 119/79 or lower  -Blood pressure between 120-139/80-89- elevated but without diagnoses of hypertension   -Hypertension is 140/90 or above and needs treatment with medication.  -Recheck  Blood pressure periodically,  if More than 140/90 Return visit with MD.   -to reach goal normal Blood pressure , follow:  -Progressive daily aerobic exercise program, most days of the week  -Follow a low fat, low cholesterol diet, attempt to lose weight    -Reduce salt in diet and cooking.  -Reduce coffee to 1-2 cups a day, less is more

## 2018-03-15 NOTE — NURSING NOTE
Chief Complaint   Patient presents with     History of Present Illness     multiple issues wanting to address per pt-see notes       Initial /90  Pulse 99  Temp 99.3  F (37.4  C) (Oral)  Resp 14  Ht 6' (1.829 m)  Wt 248 lb (112.5 kg)  SpO2 98%  BMI 33.63 kg/m2 Estimated body mass index is 33.63 kg/(m^2) as calculated from the following:    Height as of this encounter: 6' (1.829 m).    Weight as of this encounter: 248 lb (112.5 kg).  BP completed using cuff size: large    Health Maintenance that is potentially due pending provider review:  There are no preventive care reminders to display for this patient.      n/a

## 2018-08-04 DIAGNOSIS — M1A.4720 CHRONIC GOUT DUE TO OTHER SECONDARY CAUSE INVOLVING TOE OF LEFT FOOT WITHOUT TOPHUS: ICD-10-CM

## 2018-08-06 RX ORDER — ALLOPURINOL 100 MG/1
TABLET ORAL
Qty: 30 TABLET | Refills: 0 | Status: SHIPPED | OUTPATIENT
Start: 2018-08-06 | End: 2018-09-01

## 2018-08-06 NOTE — TELEPHONE ENCOUNTER
"Medication is being filled for 1 time refill only due to:  Patient needs to be seen because due for physical and labs.   Shira BRYSON RN    Requested Prescriptions   Pending Prescriptions Disp Refills     allopurinol (ZYLOPRIM) 100 MG tablet [Pharmacy Med Name: ALLOPURINOL 100MG TABLETS] 30 tablet 0     Sig: TAKE 1 TABLET(100 MG) BY MOUTH DAILY    Gout Agents Protocol Failed    8/4/2018  1:18 PM       Failed - CBC on file in past 12 months    Recent Labs   Lab Test  10/25/13   1702   WBC  7.8   RBC  4.88   HGB  15.6   HCT  43.7   PLT  232       For [facility] ONLY: VNCQ243 = WBC, SVCE648 = RBC         Failed - ALT on file in past 12 months    Recent Labs   Lab Test  07/10/17   1048   ALT  39            Failed - Has Uric Acid on file in past 12 months and value is less than 6    Recent Labs   Lab Test  07/10/17   1048   URIC  7.1     If level is 6mg/dL or greater, ok to refill one time and refer to provider.          Failed - Normal serum creatinine on file in the past 12 months    Recent Labs   Lab Test  07/10/17   1048   CR  0.81            Passed - Recent (12 mo) or future (30 days) visit within the authorizing provider's specialty    Patient had office visit in the last 12 months or has a visit in the next 30 days with authorizing provider or within the authorizing provider's specialty.  See \"Patient Info\" tab in inbasket, or \"Choose Columns\" in Meds & Orders section of the refill encounter.           Passed - Patient is age 18 or older            "

## 2018-09-27 ENCOUNTER — OFFICE VISIT (OUTPATIENT)
Dept: FAMILY MEDICINE | Facility: CLINIC | Age: 33
End: 2018-09-27
Payer: COMMERCIAL

## 2018-09-27 VITALS
SYSTOLIC BLOOD PRESSURE: 122 MMHG | HEIGHT: 72 IN | HEART RATE: 94 BPM | DIASTOLIC BLOOD PRESSURE: 74 MMHG | TEMPERATURE: 98 F | BODY MASS INDEX: 35.61 KG/M2 | OXYGEN SATURATION: 99 % | WEIGHT: 262.9 LBS

## 2018-09-27 DIAGNOSIS — E78.2 MIXED HYPERLIPIDEMIA: ICD-10-CM

## 2018-09-27 DIAGNOSIS — M1A.4720 CHRONIC GOUT DUE TO OTHER SECONDARY CAUSE INVOLVING TOE OF LEFT FOOT WITHOUT TOPHUS: ICD-10-CM

## 2018-09-27 DIAGNOSIS — Z00.00 ROUTINE GENERAL MEDICAL EXAMINATION AT A HEALTH CARE FACILITY: Primary | ICD-10-CM

## 2018-09-27 DIAGNOSIS — Z23 FLU VACCINE NEED: ICD-10-CM

## 2018-09-27 DIAGNOSIS — E66.01 MORBID OBESITY (H): ICD-10-CM

## 2018-09-27 DIAGNOSIS — F41.9 ANXIETY: ICD-10-CM

## 2018-09-27 DIAGNOSIS — F41.0 ANXIETY ATTACK: ICD-10-CM

## 2018-09-27 LAB — HBA1C MFR BLD: 4.6 % (ref 0–5.6)

## 2018-09-27 PROCEDURE — 99395 PREV VISIT EST AGE 18-39: CPT | Mod: 25 | Performed by: FAMILY MEDICINE

## 2018-09-27 PROCEDURE — 90471 IMMUNIZATION ADMIN: CPT | Performed by: FAMILY MEDICINE

## 2018-09-27 PROCEDURE — 84550 ASSAY OF BLOOD/URIC ACID: CPT | Performed by: FAMILY MEDICINE

## 2018-09-27 PROCEDURE — 83036 HEMOGLOBIN GLYCOSYLATED A1C: CPT | Performed by: FAMILY MEDICINE

## 2018-09-27 PROCEDURE — 36415 COLL VENOUS BLD VENIPUNCTURE: CPT | Performed by: FAMILY MEDICINE

## 2018-09-27 PROCEDURE — 99213 OFFICE O/P EST LOW 20 MIN: CPT | Mod: 25 | Performed by: FAMILY MEDICINE

## 2018-09-27 PROCEDURE — 80053 COMPREHEN METABOLIC PANEL: CPT | Performed by: FAMILY MEDICINE

## 2018-09-27 PROCEDURE — 84439 ASSAY OF FREE THYROXINE: CPT | Performed by: FAMILY MEDICINE

## 2018-09-27 PROCEDURE — 90686 IIV4 VACC NO PRSV 0.5 ML IM: CPT | Performed by: FAMILY MEDICINE

## 2018-09-27 PROCEDURE — 84443 ASSAY THYROID STIM HORMONE: CPT | Performed by: FAMILY MEDICINE

## 2018-09-27 RX ORDER — INDOMETHACIN 50 MG/1
50 CAPSULE ORAL
Qty: 30 CAPSULE | Refills: 3 | Status: SHIPPED | OUTPATIENT
Start: 2018-09-27 | End: 2021-02-16

## 2018-09-27 RX ORDER — FLUOCINONIDE GEL 0.5 MG/G
GEL TOPICAL PRN
COMMUNITY
End: 2022-11-14

## 2018-09-27 RX ORDER — ALLOPURINOL 100 MG/1
100 TABLET ORAL DAILY
Qty: 90 TABLET | Refills: 3 | Status: SHIPPED | OUTPATIENT
Start: 2018-09-27 | End: 2019-09-30

## 2018-09-27 ASSESSMENT — ANXIETY QUESTIONNAIRES
5. BEING SO RESTLESS THAT IT IS HARD TO SIT STILL: NOT AT ALL
IF YOU CHECKED OFF ANY PROBLEMS ON THIS QUESTIONNAIRE, HOW DIFFICULT HAVE THESE PROBLEMS MADE IT FOR YOU TO DO YOUR WORK, TAKE CARE OF THINGS AT HOME, OR GET ALONG WITH OTHER PEOPLE: SOMEWHAT DIFFICULT
7. FEELING AFRAID AS IF SOMETHING AWFUL MIGHT HAPPEN: NOT AT ALL
1. FEELING NERVOUS, ANXIOUS, OR ON EDGE: NEARLY EVERY DAY
6. BECOMING EASILY ANNOYED OR IRRITABLE: SEVERAL DAYS
GAD7 TOTAL SCORE: 10
2. NOT BEING ABLE TO STOP OR CONTROL WORRYING: MORE THAN HALF THE DAYS
3. WORRYING TOO MUCH ABOUT DIFFERENT THINGS: MORE THAN HALF THE DAYS

## 2018-09-27 ASSESSMENT — PATIENT HEALTH QUESTIONNAIRE - PHQ9: 5. POOR APPETITE OR OVEREATING: MORE THAN HALF THE DAYS

## 2018-09-27 NOTE — PATIENT INSTRUCTIONS
Carly Gershone, MA, [facility]  [facility]patient [facility] Therapist  [phone] for appointments/referrals      MTM consultation: medications resistent anxiety- has had side effect on multiple serotonin specific reuptake inhibitor, SNRI/wellbutrin  Preventive Health Recommendations  Male Ages 26 - 39    Yearly exam:             See your health care provider every year in order to  o   Review health changes.   o   Discuss preventive care.    o   Review your medicines if your doctor has prescribed any.    You should be tested each year for STDs (sexually transmitted diseases), if you re at risk.     After age 35, talk to your provider about cholesterol testing. If you are at risk for heart disease, have your cholesterol tested at least every 5 years.     If you are at risk for diabetes, you should have a diabetes test (fasting glucose).  Shots: Get a flu shot each year. Get a tetanus shot every 10 years.     Nutrition:    Eat at least 5 servings of fruits and vegetables daily.     Eat whole-grain bread, whole-wheat pasta and brown rice instead of white grains and rice.     Get adequate Calcium and Vitamin D.     Lifestyle    Exercise for at least 150 minutes a week (30 minutes a day, 5 days a week). This will help you control your weight and prevent disease.     Limit alcohol to one drink per day.     No smoking.     Wear sunscreen to prevent skin cancer.     See your dentist every six months for an exam and cleaning.

## 2018-09-27 NOTE — PROGRESS NOTES
SUBJECTIVE:   CC: Eliezer Ellis is an 33 year old male who presents for preventative health visit.     Physical   Annual:     Getting at least 3 servings of Calcium per day:  Yes    Bi-annual eye exam:  NO    Dental care twice a year:  Yes    Sleep apnea or symptoms of sleep apnea:  None    Diet:  Regular (no restrictions)    Frequency of exercise:  2-3 days/week    Duration of exercise:  15-30 minutes    Taking medications regularly:  Yes    Medication side effects:  None    Additional concerns today:  No    PROBLEMS TO ADD ON...Anxiety- was been seen at [facility]  Now gets xanax as needed  Only  Multiple medications prescribed serotonin specific reuptake inhibitor, SNRI, Wellbutrin had more side effects than benefit- none long term   RICHARD-7 SCORE 7/12/2016 8/29/2016 9/27/2018   Total Score - - -   Total Score 6 5 10     None of medications helped and caused more side effects than benefit  Tried serotonin specific reuptake inhibitor, SNRI/wellbutrin   tries meditation, just started excercise   Open to getting more therapy  Not interested in medications due to previous experiences     Reports Blood pressure has been sometimes up to 130's - has been monitoring on own      Today's PHQ-2 Score:   PHQ-2 ( 1999 [employer]) 9/27/2018   Q1: Little interest or pleasure in doing things 1   Q2: Feeling down, depressed or hopeless 0   PHQ-2 Score 1   Q1: Little interest or pleasure in doing things Several days   Q2: Feeling down, depressed or hopeless Not at all   PHQ-2 Score 1       Abuse: Current or Past(Physical, Sexual or Emotional)- NO  Do you feel safe in your environment - YES    Social History   Substance Use Topics     Smoking status: Never Smoker     Smokeless tobacco: Never Used     Alcohol use 0.0 oz/week     0 Standard drinks or equivalent per week      Comment: 2-3x week     Alcohol Use 9/27/2018   If you drink alcohol do you typically have greater than 3 drinks per day OR greater than 7 drinks per week? No   No flowsheet  data found.    Last PSA: No results found for: PSA    Reviewed orders with patient. Reviewed health maintenance and updated orders accordingly - Yes  Labs reviewed in EPIC    Reviewed and updated as needed this visit by clinical staff  Tobacco  Allergies  Meds  Med Hx  Surg Hx  Fam Hx  Soc Hx        Reviewed and updated as needed this visit by Provider            Review of Systems  CONSTITUTIONAL: NEGATIVE for fever, chills, change in weight  INTEGUMENTARY/SKIN: NEGATIVE for worrisome rashes, moles or lesions  EYES: NEGATIVE for vision changes or irritation  ENT: NEGATIVE for ear, mouth and throat problems  RESP: NEGATIVE for significant cough or SOB  CV: NEGATIVE for chest pain, palpitations or peripheral edema  GI: NEGATIVE for nausea, abdominal pain, heartburn, or change in bowel habits   male: negative for dysuria, hematuria, decreased urinary stream, erectile dysfunction, urethral discharge  MUSCULOSKELETAL: NEGATIVE for significant arthralgias or myalgia  NEURO: NEGATIVE for weakness, dizziness or paresthesias  PSYCHIATRIC: NEGATIVE for changes in mood or affect    OBJECTIVE:   /74  Pulse 94  Temp 98  F (36.7  C) (Oral)  Ht 6' (1.829 m)  Wt 262 lb 14.4 oz (119.3 kg)  SpO2 99%  BMI 35.66 kg/m2    Physical Exam  GENERAL: healthy, alert and no distress  EYES: Eyes grossly normal to inspection, PERRL and conjunctivae and sclerae normal  HENT: ear canals and TM's normal, nose and mouth without ulcers or lesions  NECK: no adenopathy, no asymmetry, masses, or scars and thyroid normal to palpation  RESP: lungs clear to auscultation - no rales, rhonchi or wheezes  CV: regular rate and rhythm, normal S1 S2, no S3 or S4, no murmur, click or rub, no peripheral edema and peripheral pulses strong  ABDOMEN: soft, nontender, no hepatosplenomegaly, no masses and bowel sounds normal  MS: no gross musculoskeletal defects noted, no edema  SKIN: no suspicious lesions or rashes  NEURO: Normal strength and tone,  mentation intact and speech normal  PSYCH: mentation appears normal, affect normal/bright    Diagnostic Test Results:  none     ASSESSMENT/PLAN:   1. Routine general medical examination at a health care facility      2. Anxiety  Plan: advised to re-establish care with therapist  referral given for up town    Does try to manage  With self care best as possible- but still struggles a lot  Meds tried previously include serotonin specific reuptake inhibitor lexapro , prozac. SNRI; Effexor and also tried Wellbutrin. There are additional others used- at [facility]    MTM consult advised- he is encouarged to bring the list of medications used in [facility] as well    - MENTAL HEALTH REFERRAL  - Adult; Outpatient Treatment; Individual/Couples/Family/Group Therapy/Health Psychology[place] [facility] [place] [facility] [phone]; We will contact you to schedule the appointment or please call with any questions  - MED THERAPY MANAGE REFERRAL    3. Anxiety attack  Plan: was getting xanax from  [facility]- uses only as needed      - MENTAL HEALTH REFERRAL  - Adult; Outpatient Treatment; Individual/Couples/Family/Group Therapy/Health Psychology[place] [facility] [facility] [phone]; We will contact you to schedule the appointment or please call with any questions  - MED THERAPY MANAGE REFERRAL    4. 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 Mixed hyperlipidemia  Plan: will recheck in a year    - Lipid panel reflex to direct LDL Fasting; Future    6. Morbid obesity (H)  Plan: encouraged healthy clean diet  He is motivated and just started some routine excercise on home  elliptical    - TSH with free T4 reflex  TSH   Date Value Ref Range Status   09/27/2018 6.12 (H) 0.40 - 4.00 mU/L Final   08/29/2016 6.17 (H) 0.40 - 4.00 mU/L Final   05/11/2016 7.81 (H) 0.40 - 4.00 mU/L Final     - Hemoglobin A1c    7. Flu vaccine need    - FLU VAC PRESRV FREE QUAD SPLIT VIR, IM (3+ YRS)  - VACCINE ADMINISTRATION, INITIAL    COUNSELING:   Reviewed preventive health counseling, as reflected in patient instructions       Regular exercise       Healthy diet/nutrition    BP Readings from Last 1 Encounters:   09/27/18 122/74     Estimated body mass index is 35.66 kg/(m^2) as calculated from the following:    Height as of this encounter: 6' (1.829 m).    Weight as of this encounter: 262 lb 14.4 oz (119.3 kg).           reports that he has never smoked. He has never used smokeless tobacco.      Counseling Resources:  ATP IV Guidelines  Pooled Cohorts Equation Calculator  FRAX Risk Assessment  ICSI Preventive Guidelines  Dietary Guidelines for Americans, 2010  USDA's MyPlate  ASA Prophylaxis  Lung CA Screening    Ursula Huang MD  [facility]

## 2018-09-27 NOTE — PROGRESS NOTES
-A1C (diabetic test) is normal and indicates that your blood sugar has been in a normal range the last 3 months.thats great

## 2018-09-27 NOTE — MR AVS SNAPSHOT
After Visit Summary   9/27/2018    Eliezer Ellis    MRN: [id number]           Patient Information     Date Of Birth          1985        Visit Information        Provider Department      9/27/2018 11:20 AM Ursula Huang MD [facility]        Today's Diagnoses     Routine general medical examination at a health care facility    -  1    Morbid obesity (H)        Mixed hyperlipidemia        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        Anxiety        Anxiety attack        Flu vaccine need          Care Instructions    Carly Gershone, MA, [facility]  Outpatient Clinic Therapist  [phone] for appointments/referrals      MTM consultation: medications resistent anxiety- has had side effect on multiple serotonin specific reuptake inhibitor, SNRI/wellbutrin  Preventive Health Recommendations  Male Ages 26 - 39    Yearly exam:             See your health care provider every year in order to  o   Review health changes.   o   Discuss preventive care.    o   Review your medicines if your doctor has prescribed any.    You should be tested each year for STDs (sexually transmitted diseases), if you re at risk.     After age 35, talk to your provider about cholesterol testing. If you are at risk for heart disease, have your cholesterol tested at least every 5 years.     If you are at risk for diabetes, you should have a diabetes test (fasting glucose).  Shots: Get a flu shot each year. Get a tetanus shot every 10 years.     Nutrition:    Eat at least 5 servings of fruits and vegetables daily.     Eat whole-grain bread, whole-wheat pasta and brown rice instead of white grains and rice.     Get adequate Calcium and Vitamin D.     Lifestyle    Exercise for at least 150 minutes a week (30 minutes a day, 5 days a week). This will help you control your weight and prevent disease.     Limit alcohol to one drink per day.     No smoking.     Wear sunscreen to  prevent skin cancer.     See your dentist every six months for an exam and cleaning.             Follow-ups after your visit        Additional Services     MED THERAPY MANAGE REFERRAL       Your provider has referred you to: **[facility] (numerous locations) [phone]   [email or web address]  [facility]: [facility] [phone]   [email or web address]    Reason for Referral: medications resistent anxiety- has had side effect on multiple serotonin specific reuptake inhibitor, SNRI/wellbutrin    The [facility] department will contact you to schedule an appointment.  You may also schedule the appointment by calling [phone].  For [facility] [place] patients, please call [phone] to confirm/schedule your appointment on the next business day.    This service is designed to help you get the most from your medications.  A specially trained Pharmacist will work closely with you and your providers to solve any questions, concerns, issues or problems related to your medications.    Please bring all of your prescription and non-prescription medications (such as vitamins, over-the-counter medications, and herbals) or a detailed medication list to your appointment.    If you have a glucose meter or other home monitoring information, please also bring this to your appointment (i.e. blood glucose log, blood pressure log, pain log, etc.).            MENTAL HEALTH REFERRAL  - Adult; Outpatient Treatment; Individual/Couples/Family/Group Therapy/Health Psychology; [facility]: [facility] [phone]; We will contact you to schedule the appointment or please call with any questions       All scheduling is subject to the client's specific insurance plan & benefits, provider/location availability, and provider clinical specialities.  Please  arrive 15 minutes early for your first appointment and bring your completed paperwork.    Please be aware that coverage of these services is subject to the terms and limitations of your health insurance plan.  Call member services at your health plan with any benefit or coverage questions.                            Future tests that were ordered for you today     Open Future Orders        Priority Expected Expires Ordered    Lipid panel reflex to direct LDL Fasting Routine 8/28/2019 9/27/2019 9/27/2018            Who to contact     If you have questions or need follow up information about today's clinic visit or your schedule please contact [facility] directly at [phone].  Normal or non-critical lab and imaging results will be communicated to you by Regalamoshart, letter or phone within 4 business days after the clinic has received the results. If you do not hear from us within 7 days, please contact the clinic through Regalamoshart or phone. If you have a critical or abnormal lab result, we will notify you by phone as soon as possible.  Submit refill requests through Widbook or call your pharmacy and they will forward the refill request to us. Please allow 3 business days for your refill to be completed.          Additional Information About Your Visit        MyChart Information     Widbook gives you secure access to your electronic health record. If you see a primary care provider, you can also send messages to your care team and make appointments. If you have questions, please call your primary care clinic.  If you do not have a primary care provider, please call [phone] and they will assist you.        Care EveryWhere ID     This is your Care EveryWhere ID. This could be used by other organizations to access your [place] medical records  [id number]        Your Vitals Were     Pulse Temperature Height Pulse Oximetry BMI (Body Mass Index)       94 98  F (36.7  C) (Oral) 6' (1.829 m) 99% 35.66 kg/m2         Blood Pressure from Last 3 Encounters:   09/27/18 122/74   03/15/18 137/90   07/10/17 127/85    Weight from Last 3 Encounters:   09/27/18 262 lb 14.4 oz (119.3 kg)   03/15/18 248 lb (112.5 kg)   07/10/17 262 lb (118.8 kg)              We Performed the Following     Comprehensive metabolic panel     FLU VAC PRESRV FREE QUAD SPLIT VIR, IM (3+ YRS)     Hemoglobin A1c     MED THERAPY MANAGE REFERRAL     MENTAL HEALTH REFERRAL  - Adult; Outpatient Treatment; Individual/Couples/Family/Group Therapy/Health Psychology; [facility]: [facility] [phone]; We will contact you to schedule the appointment or please call with any questions     TSH with free T4 reflex     Uric acid     VACCINE ADMINISTRATION, INITIAL          Today's Medication Changes          These changes are accurate as of 9/27/18  1:30 PM.  If you have any questions, ask your nurse or doctor.               These medicines have changed or have updated prescriptions.        Dose/Directions    allopurinol 100 MG tablet   Commonly known as:  ZYLOPRIM   This may have changed:  See the new instructions.   Used for:  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   Changed by:  Ursula Huang MD        Dose:  100 mg   Take 1 tablet (100 mg) by mouth daily   Quantity:  90 tablet   Refills:  3       indomethacin 50 MG capsule   Commonly known as:  INDOCIN   This may have changed:  additional instructions   Used for:  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        Dose:  50 mg   Take 1 capsule (50 mg) by mouth 3 times daily (with meals)   Quantity:  30 capsule   Refills:  3            Where to get your medicines      These medications were sent to [facility] [address] AT [address] [address]     Phone:  [phone]     allopurinol 100 MG tablet    indomethacin 50 MG capsule                 Primary Care Provider Office Phone # Fax #    Ursula Mirlande Huang [phone] [address] [facility] [place] [facility] [address]        Equal Access to Services     BRUNA HORNE : Celina snehal lee marcoso Soestelaali, wademetrida luqadaha, qamagueta kaalmada amy, maribel medelliniona smith. So [place] [phone].    ATENCIÓN: Si habla español, tiene a giordano disposición servicios gratuitos de asistencia lingüística. Llame al [phone].    We comply with applicable federal civil rights laws and [place] laws. We do not discriminate on the basis of race, color, national origin, age, disability, sex, sexual orientation, or gender identity.            Thank you!     Thank you for choosing [facility]  for your care. Our goal is always to provide you with excellent care. Hearing back from our patients is one way we can continue to improve our services. Please take a few minutes to complete the written survey that you may receive in the mail after your visit with us. Thank you!             Your Updated Medication List - Protect others around you: Learn how to safely use, store and throw away your medicines at www.disposemymeds.org.          This list is accurate as of 9/27/18  1:30 PM.  Always use your most recent med list.                   Brand Name Dispense Instructions for use Diagnosis    allopurinol 100 MG tablet    ZYLOPRIM    90 tablet    Take 1 tablet (100 mg) by mouth daily    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       ALPRAZolam 1 MG tablet    XANAX    15 tablet    Take 1 tablet (1 mg) by mouth once as needed for anxiety    Anxiety attack       fluocinonide 0.05 % topical gel    LIDEX     Apply topically as needed        indomethacin 50 MG capsule    INDOCIN    30 capsule    Take 1 capsule (50 mg) by mouth 3 times daily (with meals)    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        MULTIVITAMIN ADULT PO      Take by mouth daily        order for DME     1 Box    Equipment being ordered: digital Blood pressure apparatus    Elevated blood pressure reading without diagnosis of hypertension       TGT PSYLLIUM FIBER PO      Take 1 chew tab by mouth daily Using 1 fiber Gummi daily        VITAMIN B COMPLEX PO      Take by mouth daily        VITAMIN C PO      Take 2 chew tab by mouth daily Pt takes 2 Gummi tabs daily        VITAMIN D PO      Take 1,000 Units by mouth daily Pt takes 2 tabs daily in the AM        ZYRTEC 10 MG tablet   Generic drug:  cetirizine      Take 10 mg by mouth daily.

## 2018-09-27 NOTE — NURSING NOTE
Chief Complaint   Patient presents with     Physical     with flu shot, labs      Ht 6' (1.829 m)  Wt 262 lb 14.4 oz (119.3 kg)  BMI 35.66 kg/m2 Estimated body mass index is 35.66 kg/(m^2) as calculated from the following:    Height as of this encounter: 6' (1.829 m).    Weight as of this encounter: 262 lb 14.4 oz (119.3 kg).    Injectable Influenza Immunization Documentation      Is the patient 6 months of age or older? YES    Does the patient have any of the following contraindications?          Severe allergy to eggs? No     Severe allergic reaction to previous influenza vaccines? No     History of Guillain-[place] syndrome? No     Currently have moderate or severe illness? No           Vaccination given by Shannon Lynn CMA          Health Maintenance due pending provider review:  NONE    n/a    Shannon Lynn CMA

## 2018-09-28 LAB
ALBUMIN SERPL-MCNC: 4.4 G/DL (ref 3.4–5)
ALP SERPL-CCNC: 92 U/L (ref 40–150)
ALT SERPL W P-5'-P-CCNC: 59 U/L (ref 0–70)
ANION GAP SERPL CALCULATED.3IONS-SCNC: 8 MMOL/L (ref 3–14)
AST SERPL W P-5'-P-CCNC: 27 U/L (ref 0–45)
BILIRUB SERPL-MCNC: 0.8 MG/DL (ref 0.2–1.3)
BUN SERPL-MCNC: 15 MG/DL (ref 7–30)
CALCIUM SERPL-MCNC: 9.5 MG/DL (ref 8.5–10.1)
CHLORIDE SERPL-SCNC: 103 MMOL/L (ref 94–109)
CO2 SERPL-SCNC: 27 MMOL/L (ref 20–32)
CREAT SERPL-MCNC: 0.91 MG/DL (ref 0.66–1.25)
GFR SERPL CREATININE-BSD FRML MDRD: >90 ML/MIN/1.7M2
GLUCOSE SERPL-MCNC: 92 MG/DL (ref 70–99)
POTASSIUM SERPL-SCNC: 4.4 MMOL/L (ref 3.4–5.3)
PROT SERPL-MCNC: 7.9 G/DL (ref 6.8–8.8)
SODIUM SERPL-SCNC: 138 MMOL/L (ref 133–144)
T4 FREE SERPL-MCNC: 0.82 NG/DL (ref 0.76–1.46)
TSH SERPL DL<=0.005 MIU/L-ACNC: 6.12 MU/L (ref 0.4–4)
URATE SERPL-MCNC: 5.7 MG/DL (ref 3.5–7.2)

## 2018-09-28 ASSESSMENT — ANXIETY QUESTIONNAIRES: GAD7 TOTAL SCORE: 10

## 2018-10-05 ENCOUNTER — TELEPHONE (OUTPATIENT)
Dept: PHARMACY | Facility: OTHER | Age: 33
End: 2018-10-05

## 2018-10-05 NOTE — TELEPHONE ENCOUNTER
MTM referral from: [facility] visit (referral by provider)    MTM referral outreach attempt #2 on October 5, 2018 at 1:05 PM      Outcome: Patient not reachable after several attempts, will route to MTM Pharmacist/Provider as an FYI. Thank you for the referral.    Maria Isabel Dupont, MTM Coordinator

## 2018-11-01 ENCOUNTER — TRANSFERRED RECORDS (OUTPATIENT)
Dept: HEALTH INFORMATION MANAGEMENT | Facility: CLINIC | Age: 33
End: 2018-11-01

## 2018-11-01 ENCOUNTER — OFFICE VISIT (OUTPATIENT)
Dept: PHARMACY | Facility: CLINIC | Age: 33
End: 2018-11-01
Attending: FAMILY MEDICINE
Payer: COMMERCIAL

## 2018-11-01 VITALS — WEIGHT: 266.7 LBS | SYSTOLIC BLOOD PRESSURE: 134 MMHG | DIASTOLIC BLOOD PRESSURE: 82 MMHG | BODY MASS INDEX: 36.17 KG/M2

## 2018-11-01 DIAGNOSIS — M10.9 GOUT: ICD-10-CM

## 2018-11-01 DIAGNOSIS — E63.9 NUTRITIONAL DEFICIENCY: ICD-10-CM

## 2018-11-01 DIAGNOSIS — G47.00 INSOMNIA, UNSPECIFIED TYPE: ICD-10-CM

## 2018-11-01 DIAGNOSIS — G47.00 INSOMNIA: ICD-10-CM

## 2018-11-01 DIAGNOSIS — M1A.9XX0 CHRONIC GOUT WITHOUT TOPHUS, UNSPECIFIED CAUSE, UNSPECIFIED SITE: ICD-10-CM

## 2018-11-01 DIAGNOSIS — F41.9 ANXIETY: Primary | ICD-10-CM

## 2018-11-01 DIAGNOSIS — J30.2 SEASONAL ALLERGIC RHINITIS: ICD-10-CM

## 2018-11-01 DIAGNOSIS — K12.0 CANKER SORE: ICD-10-CM

## 2018-11-01 DIAGNOSIS — G44.209 TENSION HEADACHE: ICD-10-CM

## 2018-11-01 DIAGNOSIS — J30.2 SEASONAL ALLERGIC RHINITIS, UNSPECIFIED TRIGGER: ICD-10-CM

## 2018-11-01 PROCEDURE — 99607 MTMS BY PHARM ADDL 15 MIN: CPT | Performed by: PHARMACIST

## 2018-11-01 PROCEDURE — 99605 MTMS BY PHARM NP 15 MIN: CPT | Performed by: PHARMACIST

## 2018-11-01 NOTE — PROGRESS NOTES
"SUBJECTIVE/OBJECTIVE:                           Eliezer Ellis is a 33 year old male coming in for an initial visit for Medication Therapy Management.  He was referred to me from Dr. Huang for review of anxiety medications and discussion of GeneSight testing.    Chief Complaint: Assessment of anxiety medications and GeneSight testing discussion  Personal Healthcare Goals: Gain better control of his anxiety    Allergies/ADRs: Reviewed in Epic  Tobacco: No tobacco use  Alcohol: drinks 2 days/week, trying to cut down  Caffeine: 1-2 cups/day of coffee (states more than this can worsen his anxiety)  Activity: He has been working out every morning before work on the [employer].   PMH: Reviewed in Epic    Medication Adherence/Access: no issues reported    Anxiety: Current medications include alprazolam 1 mg as needed. Previously he was using alprazolam twice a week but in the last 3-4 weeks he is using it more often. He has tried Wellbutrin (up to 450 mg/day), Lexapro, Prozac, Cymbalta, Viibryd, propranolol (queasy), and Effexor. He states that buspirone sounds familiar but he is not sure if he has tried this medication before. He has also tried some medications through [facility]. He reports he is not good at remembering his medication history, but knows he tried medications for at least a 2-4 month trial to give them adequate time to work. He reports that nothing has ever been effective but he has always had bad side effects. He states that the only medication that has worked for him is alprazolam when he feels a panic attack coming on. He thinks that when he was on Wellbutrin, the panic attacks were worse and when he tapered off this medication, the panic attacks improved. He states that Viibryd lowered his anxiety but he \"did not feel like himself\" and was feeling \"spacey.\" He states that his anxiety is \"relatively controlled without market improvement.\" He states that anxiety runs in his family (sister and mother). He is " experiencing a stressful time at work right now since his boss has left and he is doing two jobs and feels extreme anxiety everyday. He states that he is skipping activities that he enjoys due to his anxiety. He is trying to see a therapist now but he is open to trying another medication. However, he reports that it has been a challenging journey so far to find a medication that works for him.     Elevated TSH: No current medications. He was wondering if there is a concern with his high TSH levels and if that could be impacting his anxiety.     Insomnia: Not on medications. He reports cyclical thoughts when he is laying down to go to sleep. He has difficulty falling asleep. He has been trying to work on sleep hygiene currently.    Gout: Current medications allopurinol 100 mg daily, indomethacin 50 mg TID prn (he has not used since he started taking allopurinol). He has only had two gout flares about 6 months apart. He tried changing his diet and drinking dark cherry juice but then had his second flare. He denies side effects.      Headaches: Current medications include ibuprofen 600 mg once daily (5x/week). He reports that this is helpful when he uses it and denies side effects. He admits that his headaches are likely stress and anxiety related.     Canker Sores: Current medications include fluocinonide 0.05% topical gel. Reports that it is effective and no side effects.    Allergies: Current medications include cetirizine 10 mg daily. He states that it used to work a lot better but it is working fine for now. He has tried Claritin in the past. He wants to focus more on his anxiety and when that is under control, he is willing to try a different allergy medication.    Supplements: Current medications include ascorbic acid 2 gummies/day, B-complex one tab daily, cholecalciferol 1000 international unit(s) daily, and one MVI daily.     Today's Vitals: /82 (BP Location: Left arm)      ASSESSMENT:                              Current medications were reviewed today.     Medication Adherence: good, no issues identified    Anxiety: Needs improvement. Discussed GeneSight testing today given failure of multiple other medications. Patient plans to call [employer] to discuss cost and will notify us of plan moving forward.     Elevated TSH: Discussed the difference between T4 and TSH. Plan to discuss with Dr. Huang in regard to treatment.     Insomnia: Needs improvement. Aim to adequately treat anxiety to help with insomnia. Continue to practice good sleep hygiene.    Gout: Stable.    Headaches: Stable. Focus on treatment of anxiety to address this condition as well.    Canker Sores: Stable.    Allergies: Stable. Patient not interested in changing therapy at this time but could consider trying other antihistamines in the future.     Supplements: Stable.  PLAN:                            1. Call [employer] and discuss cost then let us know if you would like to proceed with GeneSight testing.  2. Follow-up with Dr. Huang regarding treatment of high TSH levels and get back to you.    I spent 60 minutes with this patient today. A copy of the visit note was provided to the patient's primary care provider.    Will follow up in 1-2 weeks regarding decision on GeneSight.    The patient declined a summary of these recommendations as an after visit summary.     Kristen Weiler, PharmD. IV Student    Norma Jimenez, RominaD, TIFFANY, BCACP  MTM Pharmacist, [facility]

## 2018-11-01 NOTE — MR AVS SNAPSHOT
After Visit Summary   11/1/2018    Eliezer Ellis    MRN: [id number]           Patient Information     Date Of Birth          1985        Visit Information        Provider Department      11/1/2018 9:30 AM Norma Jimenez [facility]        Today's Diagnoses     Anxiety    -  1    Insomnia        Gout        Tension headache        Canker sore        Seasonal allergic rhinitis        Nutritional deficiency        Insomnia, unspecified type        Chronic gout without tophus, unspecified cause, unspecified site        Seasonal allergic rhinitis, unspecified trigger           Follow-ups after your visit        Who to contact     If you have questions or need follow up information about today's clinic visit or your schedule please contact [facility] directly at [phone].  Normal or non-critical lab and imaging results will be communicated to you by [employer], letter or phone within 4 business days after the clinic has received the results. If you do not hear from us within 7 days, please contact the clinic through [employer] or phone. If you have a critical or abnormal lab result, we will notify you by phone as soon as possible.  Submit refill requests through [employer] or call your pharmacy and they will forward the refill request to us. Please allow 3 business days for your refill to be completed.          Additional Information About Your Visit        MyChart Information     [employer] gives you secure access to your electronic health record. If you see a primary care provider, you can also send messages to your care team and make appointments. If you have questions, please call your primary care clinic.  If you do not have a primary care provider, please call [phone] and they will assist you.        Care EveryWhere ID     This is your Care EveryWhere ID. This could be used by other organizations to access your [place] medical records  [id number]        Your  Vitals Were     BMI (Body Mass Index)                   36.17 kg/m2            Blood Pressure from Last 3 Encounters:   11/01/18 134/82   09/27/18 122/74   03/15/18 137/90    Weight from Last 3 Encounters:   11/01/18 266 lb 11.2 oz (121 kg)   09/27/18 262 lb 14.4 oz (119.3 kg)   03/15/18 248 lb (112.5 kg)              Today, you had the following     No orders found for display       Primary Care Provider Office Phone # Fax #    Ursula Mirlande Huang [phone] [address] [facility] [address]        Equal Access to Services     NICK HORNE : Hadii snehal Mercado, christine roberson, eli reyes, maribel smith. So [facility] [phone].    ATENCIÓN: Si habla español, tiene a giordano disposición servicios gratuitos de asistencia lingüística. Llame al [phone].    We comply with applicable federal civil rights laws and [place] laws. We do not discriminate on the basis of race, color, national origin, age, disability, sex, sexual orientation, or gender identity.            Thank you!     Thank you for choosing [facility]  for your care. Our goal is always to provide you with excellent care. Hearing back from our patients is one way we can continue to improve our services. Please take a few minutes to complete the written survey that you may receive in the mail after your visit with us. Thank you!             Your Updated Medication List - Protect others around you: Learn how to safely use, store and throw away your medicines at www.disposemymeds.org.          This list is accurate as of 11/1/18  5:51 PM.  Always use your most recent med list.                   Brand Name Dispense Instructions for use Diagnosis    allopurinol 100 MG tablet    ZYLOPRIM    90 tablet    Take 1 tablet (100 mg) by mouth daily    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       ALPRAZolam 1 MG tablet    XANAX     15 tablet    Take 1 tablet (1 mg) by mouth once as needed for anxiety    Anxiety attack       fluocinonide 0.05 % topical gel    LIDEX     Apply topically as needed        IBUPROFEN PO           indomethacin 50 MG capsule    INDOCIN    30 capsule    Take 1 capsule (50 mg) by mouth 3 times daily (with meals)    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       MULTIVITAMIN ADULT PO      Take by mouth daily        order for DME     1 Box    Equipment being ordered: digital Blood pressure apparatus    Elevated blood pressure reading without diagnosis of hypertension       TGT PSYLLIUM FIBER PO      Take 1 chew tab by mouth daily Using 1 fiber Gummi daily        VITAMIN B COMPLEX PO      Take by mouth daily        VITAMIN C PO      Take 2 chew tab by mouth daily Pt takes 2 Gummi tabs daily        VITAMIN D PO      Take 1,000 Units by mouth daily Pt takes 2 tabs daily in the AM        ZYRTEC 10 MG tablet   Generic drug:  cetirizine      Take 10 mg by mouth daily.

## 2018-11-06 ENCOUNTER — MEDICAL CORRESPONDENCE (OUTPATIENT)
Dept: HEALTH INFORMATION MANAGEMENT | Facility: CLINIC | Age: 33
End: 2018-11-06

## 2018-11-07 NOTE — PROGRESS NOTES
See Patient's Ulet message - sent in [employer] per his request.   Norma Jimenez, RominaD, TIFFANY, BCACP  MTM Pharmacist, [facility]     Patient Medical Record Information  Rhonda Martins    : 1985    Clinician: Ursula Huang  Confirmed: 2018 7:31 PM  Test(s):    Psychotropic    ICD-10  F41.0 - Panic disorder [episodic paroxysmal anxiety]  F41.9 - Anxiety disorder, unspecified    Diagnosis  According to DSM-5 criteria, does the patient suffer from Major Depressive Disorder (MDD)?  [ ] Yes  [x] No  Medical Necessity  Has the patient failed or are they currently failing at least one neuropsychiatric medication?  [x] Yes  [ ] No  List a medication that the patient has failed or is currently failing:BuSpar  ( buspirone ), Cymbalta  ( duloxetine ), Effexor  ( venlafaxine ), Inderal  ( propranolol ), Lexapro  ( escitalopram ), Prozac  ( fluoxetine ), Viibryd  ( vilazodone ), Wellbutrin  ( bupropion ), Xanax  ( alprazolam )  Is the patient on multiple medications for his/her condition which increases the risk for adverse drug events?  [x] Yes  [ ] No  Are you contemplating an alteration in a neuropsychiatric medication treatment?  [x] Yes  [ ] No    Analgesic    ICD-10  F41.0 - Panic disorder [episodic paroxysmal anxiety]    Medical Necessity (select all that apply)  [ ] Patient has failed or is currently failing at least one analgesic medication and I am contemplating an alteration in an analgesic medication treatment.  [x] Patient is on multiple medications for his/her condition which increases the risk for adverse drug events.  [ ] Patient suspected of abuse and/or diversion with current medication regimen.  [ ] Initial onset of condition in patient with no prior pharmacological treatment history for condition.    ADHD    ICD-10  F41.0 - Panic disorder [episodic paroxysmal anxiety]    Medical Necessity (select all that apply)  [x] Patient has failed or is currently failing at least one  neuropsychiatric medication and I am contemplating an alteration in a neuropsychiatric medication treatment.  [x] Patient is on multiple medications for his/her condition which increases the risk for adverse drug events.  [ ] Patient suspected of abuse and/or diversion with current medication regimen.  [ ] Initial onset of condition in patient with no prior pharmacological treatment history for condition.    MTHFR    ICD-10  F41.0 - Panic disorder [episodic paroxysmal anxiety]    Medical Necessity (select all that apply)  [x] I am considering folate supplementation for patient.  [ ] Patient has low serum folate levels.

## 2018-11-29 ENCOUNTER — OFFICE VISIT (OUTPATIENT)
Dept: PHARMACY | Facility: CLINIC | Age: 33
End: 2018-11-29
Payer: COMMERCIAL

## 2018-11-29 DIAGNOSIS — R79.89 ELEVATED TSH: ICD-10-CM

## 2018-11-29 DIAGNOSIS — G44.209 TENSION HEADACHE: ICD-10-CM

## 2018-11-29 DIAGNOSIS — F41.9 ANXIETY: Primary | ICD-10-CM

## 2018-11-29 PROBLEM — Z13.79 ENCOUNTER FOR PHARMACOGENETIC TESTING: Status: ACTIVE | Noted: 2018-11-29

## 2018-11-29 PROCEDURE — 99606 MTMS BY PHARM EST 15 MIN: CPT | Performed by: PHARMACIST

## 2018-11-29 PROCEDURE — 99607 MTMS BY PHARM ADDL 15 MIN: CPT | Performed by: PHARMACIST

## 2018-11-29 NOTE — PATIENT INSTRUCTIONS
Recommendations from today's MTM visit:                                                        1. Think about starting Pristiq (desvenlafaxine) or Buspar (buspirone)    2. We'll recheck your thyroid in late March    Next MTM visit: Norma will reach out over [employer] in a month    To schedule another MTM appointment, please call the clinic directly or you may call the MTM scheduling line at [phone] or toll-free at [phone].     My Clinical Pharmacist's contact information:                                                      It was a pleasure talking with you today!  Please feel free to contact me with any questions or concerns you have.      Norma Jimenez, Pharm.D., M.B.A., BCACP  MTM Pharmacist, [facility]  Pager: [phone]  Email: [email or web address]    You may receive a survey about the MTM services you received.  I would appreciate your feedback to help me serve you better in the future. Please fill it out and return it when you can. Your comments will be anonymous.

## 2018-11-29 NOTE — MR AVS SNAPSHOT
After Visit Summary   11/29/2018    Eliezer Ellsi    MRN: [id number]           Patient Information     Date Of Birth          1985        Visit Information        Provider Department      11/29/2018 11:00 AM Norma Jimenez, [facility] [place]        Care Instructions    Recommendations from today's MTM visit:                                                        1. Think about starting Pristiq (desvenlafaxine) or Buspar (buspirone)    2. We'll recheck your thyroid in late March    Next MTM visit: Norma will reach out over [employer] in a month    To schedule another MTM appointment, please call the clinic directly or you may call the MTM scheduling line at [phone] or toll-free at [phone].     My Clinical Pharmacist's contact information:                                                      It was a pleasure talking with you today!  Please feel free to contact me with any questions or concerns you have.      Norma Jimenez, Pharm.D., M.B.A., [facility]  MTM Pharmacist, [facility]  Pager: [phone]  Email: [email or web address]    You may receive a survey about the MTM services you received.  I would appreciate your feedback to help me serve you better in the future. Please fill it out and return it when you can. Your comments will be anonymous.                Follow-ups after your visit        Who to contact     If you have questions or need follow up information about today's clinic visit or your schedule please contact [facility] MTM directly at [phone].  Normal or non-critical lab and imaging results will be communicated to you by MyChart, letter or phone within 4 business days after the clinic has received the results. If you do not hear from us within 7 days, please contact the clinic through [employer] or phone. If you have a critical or abnormal lab result, we will notify you by phone as soon as possible.  Submit refill requests  through [employer] or call your pharmacy and they will forward the refill request to us. Please allow 3 business days for your refill to be completed.          Additional Information About Your Visit        [employer] System[employer]art Information     [employer] gives you secure access to your electronic health record. If you see a primary care provider, you can also send messages to your care team and make appointments. If you have questions, please call your primary care clinic.  If you do not have a primary care provider, please call [phone] and they will assist you.        Care EveryWhere ID     This is your Care EveryWhere ID. This could be used by other organizations to access your [place] medical records  [id number]         Blood Pressure from Last 3 Encounters:   11/01/18 134/82   09/27/18 122/74   03/15/18 137/90    Weight from Last 3 Encounters:   11/01/18 266 lb 11.2 oz (121 kg)   09/27/18 262 lb 14.4 oz (119.3 kg)   03/15/18 248 lb (112.5 kg)              Today, you had the following     No orders found for display       Primary Care Provider Office Phone # Fax #    Ursula Mirlande Huang [phone] [address] [facility] [address]        Equal Access to Services     BRUNA HORNE : Hadii aad ku hadasho Soomaali, waaxda luqadaha, qaybta kaalmada adeegyada, maribel freedmann delmy smith. So [facility] [phone].    ATENCIÓN: Si habla español, tiene a giordano disposición servicios gratuitos de asistencia lingüística. Llame al [phone].    We comply with applicable federal civil rights laws and [place] laws. We do not discriminate on the basis of race, color, national origin, age, disability, sex, sexual orientation, or gender identity.            Thank you!     Thank you for choosing [facility]  for your care. Our goal is always to provide you with excellent care. Hearing back from our patients is one way we can continue to improve our services. Please take a few minutes  to complete the written survey that you may receive in the mail after your visit with us. Thank you!             Your Updated Medication List - Protect others around you: Learn how to safely use, store and throw away your medicines at www.disposemymeds.org.          This list is accurate as of 11/29/18 11:50 AM.  Always use your most recent med list.                   Brand Name Dispense Instructions for use Diagnosis    allopurinol 100 MG tablet    ZYLOPRIM    90 tablet    Take 1 tablet (100 mg) by mouth daily    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       ALPRAZolam 1 MG tablet    XANAX    15 tablet    Take 1 tablet (1 mg) by mouth once as needed for anxiety    Anxiety attack       fluocinonide 0.05 % external gel    LIDEX     Apply topically as needed        IBUPROFEN PO           indomethacin 50 MG capsule    INDOCIN    30 capsule    Take 1 capsule (50 mg) by mouth 3 times daily (with meals)    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       MULTIVITAMIN ADULT PO      Take by mouth daily        order for DME     1 Box    Equipment being ordered: digital Blood pressure apparatus    Elevated blood pressure reading without diagnosis of hypertension       TGT PSYLLIUM FIBER PO      Take 1 chew tab by mouth daily Using 1 fiber Gummi daily        VITAMIN B COMPLEX PO      Take by mouth daily        VITAMIN C PO      Take 2 chew tab by mouth daily Pt takes 2 Gummi tabs daily        VITAMIN D PO      Take 1,000 Units by mouth daily Pt takes 2 tabs daily in the AM        ZYRTEC 10 MG tablet   Generic drug:  cetirizine      Take 10 mg by mouth daily.

## 2018-11-29 NOTE — PROGRESS NOTES
SUBJECTIVE/OBJECTIVE:                Eliezer Ellis is a 33 year old male coming in for a follow-up visit for Medication Therapy Management.  He was referred to me from Dr. Huang.     Chief Complaint: Follow up from our visit on 11/1/18.  Review of GeneSight results.     Tobacco: No tobacco use  Alcohol: drinks 2 days/week, trying to cut down.     Medication Adherence/Access: no issues reported    Anxiety: Current medications include alprazolam 1 mg as needed anywhere from 2-7 times per week. See last note for therapies previously tried and failed. He notes that he gets confused between which meds he has taken in the past and which meds his partner has taken (recognizes the names of many, but isn't sure if it's because he took them or she took them).   Believes he's tried: bupropion, escitalopram, fluoxetine, duloxetine, Viibryd, propranolol and venlafaxine. Per [facility] we have records of him trying: venlafaxine, fluoxetine, escitalopram, buspirone, bupropoin, alprazolam).  Since our last visit he has also found a mental health provider he would like to start seeing at [facility]. He is working on scheduling an appointment.     Elevated TSH: No current medications. He was wondering if there is a concern with his high TSH levels and if that could be impacting his anxiety.     Headaches: Current medications include ibuprofen 600 mg once daily (5x/week). He reports that this is helpful when he uses it and denies side effects. He admits that his headaches are likely stress and anxiety related.     GeneSight results are as follows:               ASSESSMENT:              Current medications were reviewed today as discussed above.      Medication Adherence:  no issues identified    Anxiety: Discussed genetic testing results. Important to note that [employer] differs in interpretation of his phenotype compared to [employer] guidelines. 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 Nevertheless results indicate that a medication with a broader MOA like an SNRI would be more beneficial. Discussed options - pt does not want to try Viibryd again due to previous negative side effects (see last OV note). Fetzima likely expensive and not covered by insurance. Pristiq is an option. Buspirone is another option as well - we do have in his record that he has tried this but Rx was written and canceled in the same encounter, so it's unlikely he ever took this. Pt is unsure at this time if he would like to try medications or just therapy.     Elevated TSH: Discussed with Dr. Huang - plan is to recheck in 6 months (end of March).     Headaches: Discussed rebound headaches and long term risks of ibuprofen. Agree that getting anxiety under better control is best step to trying to improve HA.      PLAN:                  1. GeneSight test reviewed - no changes today. Pt to think about starting Pristiq or Buspar if he desires  2. Pt to set up an appointment with [facility] provider    I spent 50 minutes with this patient today. A copy of the visit note was provided to the patient's primary care provider.     Will follow up in 1 month, sooner if needed.    The patient was given a summary of these recommendations as an after visit summary.    Norma Jimenez, RominaD, TIFFANY, BCACP  MTM Pharmacist, [facility]

## 2018-12-05 ENCOUNTER — OFFICE VISIT (OUTPATIENT)
Dept: FAMILY MEDICINE | Facility: CLINIC | Age: 33
End: 2018-12-05
Payer: COMMERCIAL

## 2018-12-05 ENCOUNTER — TELEPHONE (OUTPATIENT)
Dept: FAMILY MEDICINE | Facility: CLINIC | Age: 33
End: 2018-12-05

## 2018-12-05 VITALS
HEART RATE: 84 BPM | WEIGHT: 257.1 LBS | BODY MASS INDEX: 34.82 KG/M2 | DIASTOLIC BLOOD PRESSURE: 85 MMHG | HEIGHT: 72 IN | OXYGEN SATURATION: 96 % | TEMPERATURE: 99.3 F | SYSTOLIC BLOOD PRESSURE: 129 MMHG

## 2018-12-05 DIAGNOSIS — J06.9 VIRAL UPPER RESPIRATORY TRACT INFECTION WITH COUGH: Primary | ICD-10-CM

## 2018-12-05 DIAGNOSIS — J98.01 BRONCHOSPASM: ICD-10-CM

## 2018-12-05 DIAGNOSIS — F41.9 ANXIETY: ICD-10-CM

## 2018-12-05 PROCEDURE — 99214 OFFICE O/P EST MOD 30 MIN: CPT | Performed by: FAMILY MEDICINE

## 2018-12-05 RX ORDER — CODEINE PHOSPHATE AND GUAIFENESIN 10; 100 MG/5ML; MG/5ML
2 SOLUTION ORAL
Qty: 120 ML | Refills: 0 | Status: SHIPPED | OUTPATIENT
Start: 2018-12-05 | End: 2019-05-06

## 2018-12-05 RX ORDER — PREDNISONE 20 MG/1
20 TABLET ORAL DAILY
Qty: 5 TABLET | Refills: 0 | Status: SHIPPED | OUTPATIENT
Start: 2018-12-05 | End: 2019-05-06

## 2018-12-05 RX ORDER — ALBUTEROL SULFATE 90 UG/1
2 AEROSOL, METERED RESPIRATORY (INHALATION) EVERY 6 HOURS PRN
Qty: 1 INHALER | Refills: 0 | Status: SHIPPED | OUTPATIENT
Start: 2018-12-05 | End: 2019-09-25

## 2018-12-05 RX ORDER — DESVENLAFAXINE 25 MG/1
25 TABLET, EXTENDED RELEASE ORAL DAILY
Qty: 30 TABLET | Refills: 1 | Status: SHIPPED | OUTPATIENT
Start: 2018-12-05 | End: 2019-05-06

## 2018-12-05 RX ORDER — BUSPIRONE HYDROCHLORIDE 5 MG/1
TABLET ORAL
Qty: 150 TABLET | Refills: 0 | Status: SHIPPED | OUTPATIENT
Start: 2018-12-05 | End: 2019-05-06

## 2018-12-05 NOTE — PROGRESS NOTES
SUBJECTIVE:   Eliezer Ellis is a 33 year old male who presents to clinic today for the following health issues:      RESPIRATORY SYMPTOMS      Duration: x4-5 days . Coughing worse at night- feels tired because of poor sleep from  Coughing.nyquil- did not help at all- took it for 2 nights- did not take any last night    Description  sore throat, cough, wheezing, chills, headache, fatigue/malaise, nausea and hoarse voice    Severity: severe    Accompanying signs and symptoms: chest discomfort, SOB    History (predisposing factors):  asthma    Precipitating or alleviating factors: None    Therapies tried and outcome:  cough drops, ibuprofen and nyquil       PROBLEMS TO ADD ON... Anxiety is about the same would like to discuss medication has had MTM consultation    Problem list and histories reviewed & adjusted, as indicated.  Additional history: as documented    Patient Active Problem List   Diagnosis     Onychomycosis     CARDIOVASCULAR SCREENING; LDL GOAL LESS THAN 160     Prehypertension     Anxiety attack     Anxiety     Mixed hyperlipidemia     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     Obesity (BMI 35.0-39.9) with comorbidity (H)     Encounter for pharmacogenetic testing     No past surgical history on file.    Social History   Substance Use Topics     Smoking status: Never Smoker     Smokeless tobacco: Never Used     Alcohol use 0.0 oz/week     0 Standard drinks or equivalent per week      Comment: 2-3x week     Family History   Problem Relation Age of Onset     Family History Negative Mother      Blood Disease Father 65     VTE, on coumadin           Reviewed and updated as needed this visit by clinical staff       Reviewed and updated as needed this visit by Provider         ROS:  Constitutional, HEENT, cardiovascular, pulmonary, gi and gu systems are negative, except as otherwise noted.    OBJECTIVE:     /85  Pulse 84  Temp 99.3  F (37.4  C) (Oral)  Ht 6'  (1.829 m)  Wt 257 lb 1.6 oz (116.6 kg)  SpO2 96%  BMI 34.87 kg/m2  Body mass index is 34.87 kg/(m^2).  GENERAL: healthy, alert and no distress  EYES: Eyes grossly normal to inspection, PERRL and conjunctivae and sclerae normal  HENT: ear canals and TM's normal, nose and mouth without ulcers or lesions  NECK: no adenopathy, no asymmetry, masses, or scars and thyroid normal to palpation  RESP: Scattered wheezes.  No crackles  CV: regular rate and rhythm, normal S1 S2, no S3 or S4, no murmur, click or rub, no peripheral edema and peripheral pulses strong  ABDOMEN: soft, nontender, no hepatosplenomegaly, no masses and bowel sounds normal    ASSESSMENT/PLAN:     Viral upper respiratory tract infection with cough  Plan: Monitor the symptoms, symptomatic treatment with good hydration, relative rest. No need for antibiotic- unless unchanged symptoms for another 10 days- then follow up is recommended   - guaiFENesin-codeine (ROBITUSSIN AC) 100-10 MG/5ML solution; Take 10 mLs by mouth nightly as needed for cough    Bronchospasm  Plan:  - albuterol (PROAIR HFA/PROVENTIL HFA/VENTOLIN HFA) 108 (90 Base) MCG/ACT inhaler; Inhale 2 puffs into the lungs every 6 hours as needed for shortness of breath / dyspnea or wheezing  - predniSONE (DELTASONE) 20 MG tablet; Take 1 tablet (20 mg) by mouth daily    Anxiety  Plan:- desvenlafaxine succinate (PRISTIQ) 25 MG 24 hr tablet; Take 1 tablet (25 mg) by mouth daily  - busPIRone (BUSPAR) 5 MG tablet; Start at 5 mg twice daily  We discussed his gene site testing.  He will look into starting Pristiq at a very small dose.  In case it is expensive or again ineffective the next step would be to consider BuSpar 5 mg twice daily.  It can be a good combination.  He understands if worsening of anxiety or side effects he will follow-up earlier otherwise in a month  Ursula Huang MD  [facility]

## 2018-12-05 NOTE — MR AVS SNAPSHOT
After Visit Summary   12/5/2018    Eliezer Ellis    MRN: [id number]           Patient Information     Date Of Birth          1985        Visit Information        Provider Department      12/5/2018 12:40 PM Ursula Huang MD [facility]        Today's Diagnoses     Viral upper respiratory tract infection with cough    -  1    Bronchospasm        Anxiety           Follow-ups after your visit        Follow-up notes from your care team     Return in about 4 weeks (around 1/2/2019) for anxiety.      Who to contact     If you have questions or need follow up information about today's clinic visit or your schedule please contact [facility] directly at [phone].  Normal or non-critical lab and imaging results will be communicated to you by MyChart, letter or phone within 4 business days after the clinic has received the results. If you do not hear from us within 7 days, please contact the clinic through [facility] or phone. If you have a critical or abnormal lab result, we will notify you by phone as soon as possible.  Submit refill requests through [facility] or call your pharmacy and they will forward the refill request to us. Please allow 3 business days for your refill to be completed.          Additional Information About Your Visit        MyChart Information     [employer] gives you secure access to your electronic health record. If you see a primary care provider, you can also send messages to your care team and make appointments. If you have questions, please call your primary care clinic.  If you do not have a primary care provider, please call [phone] and they will assist you.        Care EveryWhere ID     This is your Care EveryWhere ID. This could be used by other organizations to access your [place] medical records  [id number]        Your Vitals Were     Pulse Temperature Height Pulse Oximetry BMI (Body Mass Index)       84 99.3  F (37.4  C) (Oral) 6' (1.829 m)  96% 34.87 kg/m2        Blood Pressure from Last 3 Encounters:   12/05/18 129/85   11/01/18 134/82   09/27/18 122/74    Weight from Last 3 Encounters:   12/05/18 257 lb 1.6 oz (116.6 kg)   11/01/18 266 lb 11.2 oz (121 kg)   09/27/18 262 lb 14.4 oz (119.3 kg)              Today, you had the following     No orders found for display         Today's Medication Changes          These changes are accurate as of 12/5/18 11:59 PM.  If you have any questions, ask your nurse or doctor.               Start taking these medicines.        Dose/Directions    albuterol 108 (90 Base) MCG/ACT inhaler   Commonly known as:  PROAIR HFA/PROVENTIL HFA/VENTOLIN HFA   Used for:  Bronchospasm   Started by:  Ursula Huang MD        Dose:  2 puff   Inhale 2 puffs into the lungs every 6 hours as needed for shortness of breath / dyspnea or wheezing   Quantity:  1 Inhaler   Refills:  0       busPIRone 5 MG tablet   Commonly known as:  BUSPAR   Used for:  Anxiety   Started by:  Ursula Huang MD        Start at 5 mg twice daily   Quantity:  150 tablet   Refills:  0       desvenlafaxine succinate 25 MG 24 hr tablet   Commonly known as:  PRISTIQ   Used for:  Anxiety   Started by:  Ursula Huang MD        Dose:  25 mg   Take 1 tablet (25 mg) by mouth daily   Quantity:  30 tablet   Refills:  1       guaiFENesin-codeine 100-10 MG/5ML solution   Commonly known as:  ROBITUSSIN AC   Used for:  Viral upper respiratory tract infection with cough   Started by:  Ursula Huang MD        Dose:  2 tsp.   Take 10 mLs by mouth nightly as needed for cough   Quantity:  120 mL   Refills:  0       predniSONE 20 MG tablet   Commonly known as:  DELTASONE   Used for:  Bronchospasm   Started by:  Ursula Huang MD        Dose:  20 mg   Take 1 tablet (20 mg) by mouth daily   Quantity:  5 tablet   Refills:  0            Where to get your medicines      [facility] [address] [address]  [address] AT [facility] [address]  [address] [facility] [address]     Phone:  [phone]     albuterol 108 (90 Base) MCG/ACT inhaler    busPIRone 5 MG tablet    desvenlafaxine succinate 25 MG 24 hr tablet    predniSONE 20 MG tablet         Some of these will need a paper prescription and others can be bought over the counter.  Ask your nurse if you have questions.     Bring a paper prescription for each of these medications     guaiFENesin-codeine 100-10 MG/5ML solution                Primary Care Provider Office Phone # Fax #    Ursula Mirlande Huang [phone] [address] [facility] [address]        Equal Access to Services     BRUNA HORNE : Celina Mercado, washaggy roberson, qaroberto kaalmafarhad reyes, maribel brumfield . So [facility] [phone].    ATENCIÓN: Si habla español, tiene a giordano disposición servicios gratuitos de asistencia lingüística. Llame al [phone].    We comply with applicable federal civil rights laws and [place] laws. We do not discriminate on the basis of race, color, national origin, age, disability, sex, sexual orientation, or gender identity.            Thank you!     Thank you for choosing [facility]  for your care. Our goal is always to provide you with excellent care. Hearing back from our patients is one way we can continue to improve our services. Please take a few minutes to complete the written survey that you may receive in the mail after your visit with us. Thank you!             Your Updated Medication List - Protect others around you: Learn how to safely use, store and throw away your medicines at www.disposemymeds.org.          This list is accurate as of 12/5/18 11:59 PM.  Always use your most recent med list.                   Brand Name Dispense Instructions for use Diagnosis    albuterol 108 (90 Base) MCG/ACT inhaler    PROAIR HFA/PROVENTIL HFA/VENTOLIN HFA    1 Inhaler    Inhale 2 puffs into  the lungs every 6 hours as needed for shortness of breath / dyspnea or wheezing    Bronchospasm       allopurinol 100 MG tablet    ZYLOPRIM    90 tablet    Take 1 tablet (100 mg) by mouth daily    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       ALPRAZolam 1 MG tablet    XANAX    15 tablet    Take 1 tablet (1 mg) by mouth once as needed for anxiety    Anxiety attack       busPIRone 5 MG tablet    BUSPAR    150 tablet    Start at 5 mg twice daily    Anxiety       desvenlafaxine succinate 25 MG 24 hr tablet    PRISTIQ    30 tablet    Take 1 tablet (25 mg) by mouth daily    Anxiety       fluocinonide 0.05 % external gel    LIDEX     Apply topically as needed        guaiFENesin-codeine 100-10 MG/5ML solution    ROBITUSSIN AC    120 mL    Take 10 mLs by mouth nightly as needed for cough    Viral upper respiratory tract infection with cough       IBUPROFEN PO           indomethacin 50 MG capsule    INDOCIN    30 capsule    Take 1 capsule (50 mg) by mouth 3 times daily (with meals)    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       MULTIVITAMIN ADULT PO      Take by mouth daily        order for DME     1 Box    Equipment being ordered: digital Blood pressure apparatus    Elevated blood pressure reading without diagnosis of hypertension       predniSONE 20 MG tablet    DELTASONE    5 tablet    Take 1 tablet (20 mg) by mouth daily    Bronchospasm       TGT PSYLLIUM FIBER PO      Take 1 chew tab by mouth daily Using 1 fiber Gummi daily        VITAMIN B COMPLEX PO      Take by mouth daily        VITAMIN C PO      Take 2 chew tab by mouth daily Pt takes 2 Gummi tabs daily        VITAMIN D PO      Take 1,000 Units by mouth daily Pt takes 2 tabs daily in the AM        ZYRTEC 10 MG tablet   Generic drug:  cetirizine      Take 10 mg by mouth daily.

## 2018-12-05 NOTE — TELEPHONE ENCOUNTER
Reason for call:  Patient reporting a symptom    Symptom or request: Sore Throat, Chills and Body aches    Duration (how long have symptoms been present): Started on Saturday with Sore throat   Patient has had no Fever     Have you been treated for this before? No    Additional comments: Wants to be seen or have an E-Visit     Phone Number patient can be reached at:  Home number on file [phone] (home)    Best Time:  anytime    Can we leave a detailed message on this number:  YES    Call taken on 12/5/2018 at 9:52 AM by Shalini Rajput

## 2018-12-05 NOTE — TELEPHONE ENCOUNTER
Patient has been ill since Friday he said  Saturday symptoms worsened    Symptoms at this time:   Sore throat - worse in the morning  Cough - dry and productive, intermittent  Chills  Intermittent headache   Feels like most of symptoms are in chest rather than head  Denies runny nose and fever  Is achy and tired too      Has been taking Nyquil and Ibuprofen PRN   Also using cough drops    Wants to have OV for symptoms  Advised in office instead of evisit  A.S will want to see pt, check vitals, and listen to lungs    Next 5 appointments (look out 90 days)     Dec 05, 2018 12:40 PM CST   Office Visit with Ursula Huang MD   [facility] ([facility])    [address]  [facility] [address] [phone]                Shira BRYSON RN

## 2018-12-05 NOTE — NURSING NOTE
Chief Complaint   Patient presents with     URI     /85  Pulse 84  Temp 99.3  F (37.4  C) (Oral)  Ht 6' (1.829 m)  Wt 257 lb 1.6 oz (116.6 kg)  SpO2 96%  BMI 34.87 kg/m2 Estimated body mass index is 34.87 kg/(m^2) as calculated from the following:    Height as of this encounter: 6' (1.829 m).    Weight as of this encounter: 257 lb 1.6 oz (116.6 kg).  Medication Reconciliation: complete      Health Maintenance that is potentially due pending provider review:  NONE    n/a    NUVIA [place]

## 2019-05-06 ENCOUNTER — OFFICE VISIT (OUTPATIENT)
Dept: FAMILY MEDICINE | Facility: CLINIC | Age: 34
End: 2019-05-06
Payer: COMMERCIAL

## 2019-05-06 VITALS
WEIGHT: 246.9 LBS | SYSTOLIC BLOOD PRESSURE: 115 MMHG | OXYGEN SATURATION: 98 % | HEIGHT: 72 IN | HEART RATE: 86 BPM | BODY MASS INDEX: 33.44 KG/M2 | DIASTOLIC BLOOD PRESSURE: 79 MMHG

## 2019-05-06 DIAGNOSIS — T14.8XXA MUSCLE STRAIN: Primary | ICD-10-CM

## 2019-05-06 DIAGNOSIS — F41.9 ANXIETY: ICD-10-CM

## 2019-05-06 DIAGNOSIS — E66.01 MORBID OBESITY (H): ICD-10-CM

## 2019-05-06 PROCEDURE — 99214 OFFICE O/P EST MOD 30 MIN: CPT | Performed by: FAMILY MEDICINE

## 2019-05-06 RX ORDER — BUSPIRONE HYDROCHLORIDE 5 MG/1
TABLET ORAL
Qty: 60 TABLET | Refills: 1 | Status: SHIPPED | OUTPATIENT
Start: 2019-05-06 | End: 2019-09-25

## 2019-05-06 RX ORDER — DESVENLAFAXINE 25 MG/1
25 TABLET, EXTENDED RELEASE ORAL DAILY
Qty: 30 TABLET | Refills: 1 | Status: SHIPPED | OUTPATIENT
Start: 2019-05-06 | End: 2019-09-25

## 2019-05-06 ASSESSMENT — MIFFLIN-ST. JEOR: SCORE: 2087.93

## 2019-05-06 NOTE — PATIENT INSTRUCTIONS
1. Muscle strain  zachary mid thoracis strain from  elliptical or another repetitive activity     2. Anxiety  Consider starting the medications for on going anxiety    - desvenlafaxine succinate (PRISTIQ) 25 MG 24 hr tablet; Take 1 tablet (25 mg) by mouth daily  Dispense: 30 tablet; Refill: 1  - busPIRone (BUSPAR) 5 MG tablet; Start at 5 mg twice daily  Dispense: 60 tablet; Refill: 1

## 2019-05-06 NOTE — NURSING NOTE
Chief Complaint   Patient presents with     Back Pain     Pulse 86   Ht 1.829 m (6')   Wt 112 kg (246 lb 14.4 oz)   SpO2 98%   BMI 33.49 kg/m   Estimated body mass index is 33.49 kg/m  as calculated from the following:    Height as of this encounter: 1.829 m (6').    Weight as of this encounter: 112 kg (246 lb 14.4 oz).  Medication Reconciliation: complete        Health Maintenance Due Pending Provider Review:  NONE    n/a    Pateince Barbour MA  [facility]  [phone]

## 2019-05-06 NOTE — PROGRESS NOTES
SUBJECTIVE:   Eliezer Ellis is a 34 year old male who presents to clinic today for the following   health issues:      Back Pain       Duration: 1 month        Specific cause: none    Description:   Location of pain: low back left  Character of pain: sharp and dull ache- not constant, but often  Pain radiation: can often radiate into the front  New numbness or weakness in legs, not attributed to pain:  no     Intensity: Currently 0/10, At its worst 5/10,associated with & worse with certain mositin- like turning in bed    History:   Pain interferes with job: No, pt is recently unemployed.  History of back problems: no prior back problems  Any previous MRI or X-rays: None  Sees a specialist for back pain:  No  Therapies tried without relief: none    Alleviating factors:   Improved by: none      Precipitating factors:  Worsened by: turning in bed. Also started elliptical in past 2 months            Accompanying Signs & Symptoms:  Risk of Fracture:  None  Risk of Cauda Equina:  None  Risk of Infection:  None  Risk of Cancer:  None  Risk of Ankylosing Spondylitis:  Onset at age <35, male, AND morning back stiffness. 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  Never started buspar & preistque - but wondering if he shuold start it- since last year mood not any better   Fear of staring new medications    Feeling a lot pf anxiety, wondering if that is impairing job seeking potential. Willing to consider medications now.      PROBLEMS TO ADD ON...    Additional history: as documented    Reviewed  and updated as needed this  visit by clinical staff  Tobacco  Meds         Reviewed and updated as needed this visit by Provider         Patient Active Problem List   Diagnosis     Onychomycosis     CARDIOVASCULAR SCREENING; LDL GOAL LESS THAN 160     Prehypertension     Anxiety attack     Anxiety     Mixed hyperlipidemia     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     Obesity (BMI 35.0-39.9) with comorbidity (H)     Encounter for pharmacogenetic testing     No past surgical history on file.    Social History     Tobacco Use     Smoking status: Never Smoker     Smokeless tobacco: Never Used   Substance Use Topics     Alcohol use: Yes     Alcohol/week: 0.0 oz     Comment: 2-3x week     Family History   Problem Relation Age of Onset     Family History Negative Mother      Blood Disease Father 65        VTE, on coumadin           ROS:  Constitutional, HEENT, cardiovascular, pulmonary, GI, , musculoskeletal, neuro, skin, endocrine and psych systems are negative, except as otherwise noted.    OBJECTIVE:     /79   Pulse 86   Ht 1.829 m (6')   Wt 112 kg (246 lb 14.4 oz)   SpO2 98%   BMI 33.49 kg/m    Body mass index is 33.49 kg/m .  GENERAL: healthy, alert and no distress  NECK: no adenopathy, no asymmetry, masses, or scars and thyroid normal to palpation  RESP: lungs clear to auscultation - no rales, rhonchi or wheezes  CV: regular rate and rhythm, normal S1 S2, no S3 or S4, no murmur, click or rub, no peripheral edema and peripheral pulses strong  ABDOMEN: soft, nontender, no hepatosplenomegaly, no masses and bowel sounds normal  MS: extremities normal- no gross deformities noted  No spinal pin point tenderness, flexion/extension - good ROM   CNS- intact   Psych: Alert and oriented times 3; coherent speech, normal   rate and volume, able to articulate logical thoughts, able   to abstract reason, no tangential thoughts, no hallucinations   or delusions    PHQ-9 SCORE 7/12/2016   PHQ-9 Total  Score 2     RICHARD-7 SCORE 7/12/2016 8/29/2016 9/27/2018   Total Score - - -   Total Score 6 5 10         ASSESSMENT/PLAN:     1. Muscle strain  zachary mid thoracis strain from  elliptical or another repetitive activity   Consider PHYSICAL THERAPY   Modify activity  Over the counter ibuprofen as needed  With meals for next 3-5 days and then as needed   Follow up in a months    2. Anxiety  Consider starting the medications for on going anxiety    - desvenlafaxine succinate (PRISTIQ) 25 MG 24 hr tablet; Take 1 tablet (25 mg) by mouth daily  Dispense: 30 tablet; Refill: 1  - busPIRone (BUSPAR) 5 MG tablet; Start at 5 mg twice daily  Dispense: 60 tablet; Refill: 1    Obesity  Commendable weight loss intentional = 20 lbs    Ursula Huang MD  [facility]

## 2019-05-17 PROBLEM — T14.8XXA MUSCLE STRAIN: Status: ACTIVE | Noted: 2019-05-17

## 2019-07-08 ENCOUNTER — TELEPHONE (OUTPATIENT)
Dept: FAMILY MEDICINE | Facility: CLINIC | Age: 34
End: 2019-07-08

## 2019-07-08 NOTE — LETTER
Letter for medical necessity      Eliezer Ellis , 1985,is under my professional care since Jan 2013.  Different, extensive list of psychtropic medications have been tried for his mental health and he has experineces a broad spectrum of side effects. The medications list is long and includes but not limited to multiple selective serotonin reuptake inhibitor, wellbutrin, among many other.    He had  genetic testing- [facility]. 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 Pristiq is a better  Option since there is a long list of SNRI ,including effexor that caused side effects.        Best Regards,    Ursula Huang MD  [facility]  [phone]      y

## 2019-07-08 NOTE — TELEPHONE ENCOUNTER
Prior Authorization Retail Medication Request    Medication/Dose: desvenlafaxine succinate (PRISTIQ) 25 MG 24 hr tablet  ICD code (if different than what is on RX):    Previously Tried and Failed:  Lexapro 10mg, Prozac 10mg cap, Venlafaxine, Buproprion, Awmuel5jh tab, Alprazolam 0.25mg 0.5mg and 1mg  Rationale:      Insurance Name:  [phone]  Insurance ID:  [id number]      Pharmacy Information (if different than what is on RX)  [facility]:  [facility]  Phone:  [phone]

## 2019-07-12 NOTE — TELEPHONE ENCOUNTER
Central Prior Authorization Team  Phone: [phone]    PA Initiation    Medication: desvenlafaxine succinate (PRISTIQ) 25 MG 24 hr tablet  Insurance Company: [employer] - Phone [phone] Fax [phone]  Pharmacy Filling the Rx: [facility] [address] [place], [place] - [address] [facility] [address] [facility] [address]  Filling Pharmacy Phone: [phone]  Filling Pharmacy Fax:    Start Date: 7/12/2019

## 2019-07-15 NOTE — TELEPHONE ENCOUNTER
PRIOR AUTHORIZATION DENIED    Medication: desvenlafaxine succinate (PRISTIQ) 25 MG 24 hr tablet- DENIED    Denial Date: 7/15/2019    Denial Rational: PA was denied by insurance because patient's diagnosis did not meet approval for use of this medication.    Insurance's preferred alternatives:    Appeal Information: If provider would like to appeal, please provide a letter of medical necessity.

## 2019-07-17 NOTE — TELEPHONE ENCOUNTER
PA denied for Pristiq 25mg 24hr tablet.  Reason: Pt's dx did not meet approval for use of this medication.  Insurance prefers:  Bupropion SR  Bupropion XL  Mirtazapine   Nefazodone   Trazodone  Venlafaxine  If appropriate please send new rx to [facility] [address] # [phone].

## 2019-07-18 NOTE — TELEPHONE ENCOUNTER
Please call patient.  Was he still taking it? Or ran out or had plans to start now  Was last seen in may 2019- 7 given first prescription  Is refill from  Patient or pharmacy.  For pre-approval- he has side effects and or no response to a long list of selective serotonin reuptake inhibitor- including lexapro, prozac.  SNRI-effexor,  Also wellbutrin and   Poor control of symptoms on buspar.    So enough previoulsy failed medications- to qualify him for pristique , jakob if he started using it and better on it now.  So need more info from  Patient and then send list of previously failed one as above    Thanks

## 2019-07-19 NOTE — TELEPHONE ENCOUNTER
Pt has not tried Pristiq yet as he was waiting for coverage from insurance.  Eliezer had blood tests perform to see which medications may work well for him and Pristiq came back as positive.  For appeal, please provide a letter of medical necessity and route it to [facility] [employer].

## 2019-07-29 NOTE — TELEPHONE ENCOUNTER
Medication Appeal Initiation    We have initiated an appeal for the requested medication:  Medication: desvenlafaxine succinate (PRISTIQ) 25 MG 24 hr tablet- APPEAL  Appeal Start Date:  7/29/2019  Insurance Company: [employer] [place] - Phone [phone] Fax [phone]  Comments:  MANUALLY FAXED APPEAL INFO. FAX#[phone]

## 2019-07-30 NOTE — TELEPHONE ENCOUNTER
MEDICATION APPEAL DENIED    Medication: desvenlafaxine succinate (PRISTIQ) 25 MG 24 hr tablet- APPEAL- DENIED     Denial Date: 7/30/2019    Denial Rational: Appeal was denied by insurance. Patient's diagnosis is not supported by CMS for the use of this medication.        Second Level Appeal Information:     Second level appeals will be managed by the clinic staff and provider. Please contact the [place] Prior Authorization Team if additional information about the denial is needed.

## 2019-08-05 NOTE — TELEPHONE ENCOUNTER
Sorry -I think I erroneously replied to this about [place].  It looks like an appeal is warranted in this situation. So I would suggest writing a letter and include the medications he has tried and failed and a copy of the GeneSight test.     Norma Jimenez, RominaD, TIFFANY, BCACP  MTM Pharmacist, [facility]

## 2019-08-07 ENCOUNTER — MYC MEDICAL ADVICE (OUTPATIENT)
Dept: FAMILY MEDICINE | Facility: CLINIC | Age: 34
End: 2019-08-07

## 2019-08-09 NOTE — TELEPHONE ENCOUNTER
Donna,   Can you please look into this and see if there is a number for Dr Huang to call?  Shira BRYSON RN

## 2019-08-12 NOTE — TELEPHONE ENCOUNTER
Spoke with [employer] agent.  Medicaid requires dx of major depressive disorder or major depression.  If we are able to find any notes that he has major depression we can call [employer] back and this will be considered additional information and the case will be reopened.

## 2019-08-14 NOTE — TELEPHONE ENCOUNTER
Spoke with [employer] agent.  Medicare/Medicaid with only pay for this medication if the pt has major depressive disorder or major depression, no exceptions.  LM on pt's VM to call clinic and schedule an appt with PCP to discuss symptoms.  Agent informed staff additional information may be added to the case at any time for approval.

## 2019-08-23 NOTE — TELEPHONE ENCOUNTER
Dear Triage,,   TE is best    I called patient on primary phone number to discuss mood.  And send mychart message to consider TE- ok to get it tomorrow, I am not in office but will be able to complete it- jakob in PM. Thanks

## 2019-09-25 ENCOUNTER — VIRTUAL VISIT (OUTPATIENT)
Dept: FAMILY MEDICINE | Facility: CLINIC | Age: 34
End: 2019-09-25

## 2019-09-25 DIAGNOSIS — E78.2 MIXED HYPERLIPIDEMIA: ICD-10-CM

## 2019-09-25 DIAGNOSIS — F41.9 ANXIETY: ICD-10-CM

## 2019-09-25 DIAGNOSIS — M1A.4720 CHRONIC GOUT DUE TO OTHER SECONDARY CAUSE INVOLVING TOE OF LEFT FOOT WITHOUT TOPHUS: ICD-10-CM

## 2019-09-25 DIAGNOSIS — R79.89 HIGH SERUM THYROID STIMULATING HORMONE (TSH): ICD-10-CM

## 2019-09-25 DIAGNOSIS — F33.1 MODERATE EPISODE OF RECURRENT MAJOR DEPRESSIVE DISORDER (H): Primary | ICD-10-CM

## 2019-09-25 PROCEDURE — 99441 ZZC PHYSICIAN TELEPHONE EVALUATION 5-10 MIN: CPT | Performed by: FAMILY MEDICINE

## 2019-09-25 RX ORDER — DESVENLAFAXINE 25 MG/1
25 TABLET, EXTENDED RELEASE ORAL DAILY
Qty: 30 TABLET | Refills: 3 | Status: SHIPPED | OUTPATIENT
Start: 2019-09-25 | End: 2019-09-25

## 2019-09-25 RX ORDER — DESVENLAFAXINE 25 MG/1
25 TABLET, EXTENDED RELEASE ORAL DAILY
Qty: 30 TABLET | Refills: 3 | Status: SHIPPED | OUTPATIENT
Start: 2019-09-25 | End: 2020-11-09

## 2019-09-25 RX ORDER — BUSPIRONE HYDROCHLORIDE 10 MG/1
10 TABLET ORAL 2 TIMES DAILY
Qty: 60 TABLET | Refills: 3 | Status: SHIPPED | OUTPATIENT
Start: 2019-09-25 | End: 2020-11-09

## 2019-09-25 ASSESSMENT — ANXIETY QUESTIONNAIRES
3. WORRYING TOO MUCH ABOUT DIFFERENT THINGS: SEVERAL DAYS
5. BEING SO RESTLESS THAT IT IS HARD TO SIT STILL: NOT AT ALL
6. BECOMING EASILY ANNOYED OR IRRITABLE: SEVERAL DAYS
2. NOT BEING ABLE TO STOP OR CONTROL WORRYING: SEVERAL DAYS
IF YOU CHECKED OFF ANY PROBLEMS ON THIS QUESTIONNAIRE, HOW DIFFICULT HAVE THESE PROBLEMS MADE IT FOR YOU TO DO YOUR WORK, TAKE CARE OF THINGS AT HOME, OR GET ALONG WITH OTHER PEOPLE: SOMEWHAT DIFFICULT
GAD7 TOTAL SCORE: 7
7. FEELING AFRAID AS IF SOMETHING AWFUL MIGHT HAPPEN: NOT AT ALL
1. FEELING NERVOUS, ANXIOUS, OR ON EDGE: MORE THAN HALF THE DAYS

## 2019-09-25 ASSESSMENT — PATIENT HEALTH QUESTIONNAIRE - PHQ9
SUM OF ALL RESPONSES TO PHQ QUESTIONS 1-9: 5
5. POOR APPETITE OR OVEREATING: MORE THAN HALF THE DAYS

## 2019-09-25 NOTE — PROGRESS NOTES
Eliezer Ellis is a 34 year old male who is being evaluated via a telephone visit.    S: The history as provided by the patient to the provider during this 3 way call include     Pertinent parts of the the patient's medical history reviewed and confirmed by the provider included :      Total time of call between patient and provider was  9 minutes   I have reviewed the note as documented above.  This accurately captures the substance of my conversation with the patient,  On going depression and anxiety  Has not been able to get pristique approved and concerned as previously tried other selective serotonin reuptake inhibitor, SNRI ineffective, wondering about next step.    PHQ-9 SCORE 7/12/2016 9/25/2019   PHQ-9 Total Score 2 5     RICHARD-7 SCORE 8/29/2016 9/27/2018 9/25/2019   Total Score - - -   Total Score 5 10 7     Also wondering about blood test to be futured     Additional provider notes:  Assessment/Plan:    1. Anxiety & associated 2. Moderate episode of recurrent major depressive disorder (H)  35 yo male , previously tried multiple selective serotonin reuptake inhibitor- including fluoxetine, ecitalopram, sertraline- all ineffective.  Also tried SNRI-venlafaxine, & wellbutrin- for months , & without improvement of symptoms.    - desvenlafaxine succinate (PRISTIQ) 25 MG 24 hr tablet; Take 1 tablet (25 mg) by mouth daily  Dispense: 30 tablet; Refill: 3    He also tried buspar 5 mg twice daily for 2 months, willing to try higher doses.    - busPIRone (BUSPAR) 10 MG tablet; Take 1 tablet (10 mg) by mouth 2 times daily  Dispense: 60 tablet; Refill: 3    Potential medication side effects were discussed with the patient; let me know if any occur.      We also dsicussed follow up in 4 week, and if presents fasting will screening further for hyperlipidemia & Diabetes  .  He reports no further gout attacks, will check the levels as well.      3. Mixed hyperlipidemia  - Lipid panel reflex to direct LDL Fasting; Future    4.  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 High serum thyroid stimulating hormone (TSH)  - TSH with free T4 reflex; Future              The patient has been notified of following (by DONTE Barbour MA  Medical Assistant  [facility]    \"We have found that certain health care needs can be provided without the need for a physical exam.  This service lets us provide the care you need with a short phone conversation.  If a prescription is necessary we can send it directly to your pharmacy.  If lab work is needed we can place an order for that and you can then stop by our lab to have the test done at a later time.    This telephone visit will be conducted via 3 way call with the you (the patient) , the physician/provider, and a me all on the line at the same time.  This allows your physician/provider to have the phone conversation with you while I will be taking notes for your medical record.  We will have full access to your [facility] medical record during this entire phone call.    Since this is like an office visit,  will bill your insurance company for this service.  Please check with your medical insurance if this type of telephone/virtual is covered . You may be responsible for the cost of this service if insurance coverage is denied.  The typical cost is $30 (10min), $59(11-20min) and $85 (21-30min)     If during the course of the call the physician/provider feels a telephone visit is not appropriate, you will not be charged for this service\"    Consent has been obtained for this service by care team member: yes.  See the scanned image in the medical record.          "

## 2019-09-26 ASSESSMENT — ANXIETY QUESTIONNAIRES: GAD7 TOTAL SCORE: 7

## 2019-09-30 DIAGNOSIS — M1A.4720 CHRONIC GOUT DUE TO OTHER SECONDARY CAUSE INVOLVING TOE OF LEFT FOOT WITHOUT TOPHUS: ICD-10-CM

## 2019-09-30 RX ORDER — ALLOPURINOL 100 MG/1
TABLET ORAL
Qty: 30 TABLET | Refills: 0 | Status: SHIPPED | OUTPATIENT
Start: 2019-09-30 | End: 2019-11-03

## 2019-09-30 NOTE — TELEPHONE ENCOUNTER
"Medication is being filled for 1 time refill only due to:  Patient needs to be seen because due for physical and labs.   Shira BRYSON RN    Last Written Prescription Date:  9/27/2018  Last Fill Quantity: 90,  # refills: 3   Last office visit: 9/25/2019 with prescribing provider:     Future Office Visit:    Requested Prescriptions   Pending Prescriptions Disp Refills     allopurinol (ZYLOPRIM) 100 MG tablet [Pharmacy Med Name: ALLOPURINOL 100MG TABLETS] 90 tablet 0     Sig: TAKE 1 TABLET BY MOUTH EVERY DAY       Gout Agents Protocol Failed - 9/30/2019  9:40 AM        Failed - CBC on file in past 12 months     Recent Labs   Lab Test 10/25/13  1702   WBC 7.8   RBC 4.88   HGB 15.6   HCT 43.7                    Failed - ALT on file in past 12 months     Recent Labs   Lab Test 09/27/18  1214   ALT 59             Failed - Has Uric Acid on file in past 12 months and value is less than 6     Recent Labs   Lab Test 09/27/18  1214   URIC 5.7     If level is 6mg/dL or greater, ok to refill one time and refer to provider.           Failed - Normal serum creatinine on file in the past 12 months     Recent Labs   Lab Test 09/27/18  1214   CR 0.91             Passed - Recent (12 mo) or future (30 days) visit within the authorizing provider's specialty     Patient has had an office visit with the authorizing provider or a provider within the authorizing providers department within the previous 12 mos or has a future within next 30 days. See \"Patient Info\" tab in inbasket, or \"Choose Columns\" in Meds & Orders section of the refill encounter.              Passed - Medication is active on med list        Passed - Patient is age 18 or older        "

## 2020-03-01 ENCOUNTER — HEALTH MAINTENANCE LETTER (OUTPATIENT)
Age: 35
End: 2020-03-01

## 2020-11-05 ENCOUNTER — MYC MEDICAL ADVICE (OUTPATIENT)
Dept: FAMILY MEDICINE | Facility: CLINIC | Age: 35
End: 2020-11-05

## 2020-11-09 ENCOUNTER — VIRTUAL VISIT (OUTPATIENT)
Dept: FAMILY MEDICINE | Facility: CLINIC | Age: 35
End: 2020-11-09
Payer: COMMERCIAL

## 2020-11-09 DIAGNOSIS — F41.9 ANXIETY: Primary | ICD-10-CM

## 2020-11-09 DIAGNOSIS — F41.0 ANXIETY ATTACK: ICD-10-CM

## 2020-11-09 DIAGNOSIS — F33.1 MODERATE EPISODE OF RECURRENT MAJOR DEPRESSIVE DISORDER (H): ICD-10-CM

## 2020-11-09 DIAGNOSIS — M1A.4720 CHRONIC GOUT DUE TO OTHER SECONDARY CAUSE INVOLVING TOE OF LEFT FOOT WITHOUT TOPHUS: ICD-10-CM

## 2020-11-09 PROCEDURE — 99214 OFFICE O/P EST MOD 30 MIN: CPT | Mod: 95 | Performed by: FAMILY MEDICINE

## 2020-11-09 RX ORDER — ALLOPURINOL 100 MG/1
100 TABLET ORAL DAILY
Qty: 30 TABLET | Refills: 6 | Status: SHIPPED | OUTPATIENT
Start: 2020-12-07 | End: 2021-05-03

## 2020-11-09 RX ORDER — DESVENLAFAXINE 50 MG/1
50 TABLET, FILM COATED, EXTENDED RELEASE ORAL DAILY
Qty: 30 TABLET | Refills: 11 | Status: SHIPPED | OUTPATIENT
Start: 2020-11-09 | End: 2021-05-03

## 2020-11-09 ASSESSMENT — ANXIETY QUESTIONNAIRES
IF YOU CHECKED OFF ANY PROBLEMS ON THIS QUESTIONNAIRE, HOW DIFFICULT HAVE THESE PROBLEMS MADE IT FOR YOU TO DO YOUR WORK, TAKE CARE OF THINGS AT HOME, OR GET ALONG WITH OTHER PEOPLE: SOMEWHAT DIFFICULT
1. FEELING NERVOUS, ANXIOUS, OR ON EDGE: MORE THAN HALF THE DAYS
5. BEING SO RESTLESS THAT IT IS HARD TO SIT STILL: NOT AT ALL
6. BECOMING EASILY ANNOYED OR IRRITABLE: NOT AT ALL
7. FEELING AFRAID AS IF SOMETHING AWFUL MIGHT HAPPEN: NOT AT ALL
3. WORRYING TOO MUCH ABOUT DIFFERENT THINGS: SEVERAL DAYS
GAD7 TOTAL SCORE: 5
2. NOT BEING ABLE TO STOP OR CONTROL WORRYING: SEVERAL DAYS

## 2020-11-09 ASSESSMENT — PATIENT HEALTH QUESTIONNAIRE - PHQ9
SUM OF ALL RESPONSES TO PHQ QUESTIONS 1-9: 2
5. POOR APPETITE OR OVEREATING: SEVERAL DAYS

## 2020-11-09 NOTE — PATIENT INSTRUCTIONS
We can check for diabetes and high cholesterol if you come fasting for 10 hours during which time you can drink water only but no other food or fluids, including snacks. You do need lab only appointment so call ahead of time.    Keep up with counseling.  Try priique

## 2020-11-09 NOTE — PROGRESS NOTES
"Eliezer Ellis is a 35 year old male who is being evaluated via a billable video visit.      The patient has been notified of following:     \"This video visit will be conducted via a call between you and your physician/provider. We have found that certain health care needs can be provided without the need for an in-person physical exam.  This service lets us provide the care you need with a video conversation.  If a prescription is necessary we can send it directly to your pharmacy.  If lab work is needed we can place an order for that and you can then stop by our lab to have the test done at a later time.    Video visits are billed at different rates depending on your insurance coverage.  Please reach out to your insurance provider with any questions.    If during the course of the call the physician/provider feels a video visit is not appropriate, you will not be charged for this service.\"    Patient has given verbal consent for Video visit? Yes  How would you like to obtain your AVS? MyChart  If you are dropped from the video visit, the video invite should be resent to: Text to cell phone: [phone]  Will anyone else be joining your video visit? No    Subjective     Eilezer Ellis is a 35 year old male who presents today via video visit for the following health issues:    HPI     Medication Followup of Allopurinal    Taking Medication as prescribed: yes    Side Effects:  None    Medication Helping Symptoms:  yes   History of anxiety & continues to experience chest heaviness , related to anxiety and has learnt to live with it & therapy does help.   Virtual therapy- weekly .  Buspar tried for a couple months- did not like it , did not help.  Pristique- never tried- too expensive    Did try to take lorazepam- and 1/2 dose , helps as needed     Cut off alcohol this yr  Intentional weight loss due to that- without change in mood   Dog  and partner lost her job, over all \"shity year\"  Willing to try prstiq  Willing to " try to be back on elliptical- jakob in winter    Need refill on allopurinol  Will make lab only appointment   Video Start Time: 12:01 PM        Review of Systems   Constitutional, HEENT, cardiovascular, pulmonary, GI, , musculoskeletal, neuro, skin, endocrine and psych systems are negative, except as otherwise noted.      Objective           Vitals:  No vitals were obtained today due to virtual visit.    Physical Exam     GENERAL: Healthy, alert and no distress  EYES: Eyes grossly normal to inspection.  No discharge or erythema, or obvious scleral/conjunctival abnormalities.  RESP: No audible wheeze, cough, or visible cyanosis.  No visible retractions or increased work of breathing.    SKIN: Visible skin clear. No significant rash, abnormal pigmentation or lesions.  NEURO: Cranial nerves grossly intact.  Mentation and speech appropriate for age.  PSYCH: Mentation appears normal, affect normal/bright, judgement and insight intact, normal speech and appearance well-groomed.    PHQ 7/12/2016 9/25/2019 11/9/2020   PHQ-9 Total Score 2 5 2   Q9: Thoughts of better off dead/self-harm past 2 weeks Not at all Not at all Not at all     RICHARD-7 SCORE 9/27/2018 9/25/2019 11/9/2020   Total Score - - -   Total Score 10 7 5     BP apparatus at home- > 130's/80's            Assessment & Plan     Eliezer 35 year old male was seen today for video visit.    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      See Patient Instructions    Return in about 6 months (around 5/9/2021) for BP Recheck/ HTN.    Ursula Huang MD  [facility]      Video-Visit Details    Type of service:  Video Visit    Video End Time:12:25 PM    Originating Location (pt. Location): Home    Distant Location (provider location):  [facility]     Platform used for Video Visit: [employer]

## 2020-11-10 ASSESSMENT — ANXIETY QUESTIONNAIRES: GAD7 TOTAL SCORE: 5

## 2020-11-12 ENCOUNTER — TELEPHONE (OUTPATIENT)
Dept: FAMILY MEDICINE | Facility: CLINIC | Age: 35
End: 2020-11-12

## 2020-11-12 NOTE — TELEPHONE ENCOUNTER
Prior Authorization Retail Medication Request    Medication/Dose:desvenlafaxine (PRISTIQ) 50 MG 24 hr tablet   ICD code (if different than what is on RX):    Previously Tried and Failed:    Rationale:      Insurance Name:  [employer] [phone]  Insurance ID:  [id number]      Pharmacy Information (if different than what is on RX)  Name:  [facility]   Phone:  [phone]

## 2020-11-12 NOTE — TELEPHONE ENCOUNTER
PA Initiation    Medication: desvenlafaxine (PRISTIQ) 50 MG 24 hr tablet  Insurance Company: [employer] [place] - Phone [phone] Fax [phone]  Pharmacy Filling the Rx: [id number] - [place], [place] - [facility] [address] [facility] [address]  Filling Pharmacy Phone: [phone]  Filling Pharmacy Fax: [phone]  Start Date: 11/12/2020

## 2020-11-13 NOTE — TELEPHONE ENCOUNTER
Prior Authorization Approval    Authorization Effective Date: 8/13/2020  Authorization Expiration Date: 11/13/2021  Medication: desvenlafaxine (PRISTIQ) 50 MG 24 hr tablet--APPROVED  Approved Dose/Quantity:   Reference #:     Insurance Company: [employer] [place] - Phone [phone] Fax [phone]  Expected CoPay:       CoPay Card Available:      Foundation Assistance Needed:    Which Pharmacy is filling the prescription (Not needed for infusion/clinic administered): [id number] - [place], [place] - [address] AT [facility] [address]  Pharmacy Notified: Yes  Patient Notified: Yes **Instructed pharmacy to notify patient when script is ready to /ship.**

## 2021-02-16 ENCOUNTER — VIRTUAL VISIT (OUTPATIENT)
Dept: FAMILY MEDICINE | Facility: CLINIC | Age: 36
End: 2021-02-16
Payer: COMMERCIAL

## 2021-02-16 DIAGNOSIS — F33.1 MODERATE EPISODE OF RECURRENT MAJOR DEPRESSIVE DISORDER (H): ICD-10-CM

## 2021-02-16 DIAGNOSIS — F41.9 ANXIETY: ICD-10-CM

## 2021-02-16 DIAGNOSIS — G43.109 MIGRAINE EQUIVALENT: ICD-10-CM

## 2021-02-16 DIAGNOSIS — R51.9 CHRONIC DAILY HEADACHE: Primary | ICD-10-CM

## 2021-02-16 PROCEDURE — 99214 OFFICE O/P EST MOD 30 MIN: CPT | Mod: 95 | Performed by: FAMILY MEDICINE

## 2021-02-16 RX ORDER — SUMATRIPTAN 50 MG/1
50 TABLET, FILM COATED ORAL
Qty: 30 TABLET | Refills: 1 | Status: SHIPPED | OUTPATIENT
Start: 2021-02-16 | End: 2021-03-02

## 2021-02-16 RX ORDER — AMITRIPTYLINE HYDROCHLORIDE 10 MG/1
10 TABLET ORAL AT BEDTIME
Qty: 30 TABLET | Refills: 1 | Status: SHIPPED | OUTPATIENT
Start: 2021-02-16 | End: 2021-05-03

## 2021-02-16 NOTE — PROGRESS NOTES
Eliezer is a 36 year old who is being evaluated via a billable video visit.      How would you like to obtain your AVS? MyChart  If the video visit is dropped, the invitation should be resent by:   Will anyone else be joining your video visit? No      Video Start Time: 5:48 PM    Assessment & Plan     Chronic daily headache  Assessment: 36 year old male with daily headache , without associated lacrimation, changes in vision or balance.  DDx include daily headache , migraine  variant of daily headache ,  Intracranial pathology including mass, tumour bleed, demyelinating process .    Stress may be contributing.  Advised to avoid screen time at least between 10pm to 5 am- to allow sound sleep.  Keep headache diary.  Start medications- for prevention.  Follow up in 4 weeks , earlier if ocncerns  - amitriptyline (ELAVIL) 10 MG tablet  Dispense: 30 tablet; Refill: 1      His headache have a Migraine equivalent. Sister has migraine headache     For moderate acute headache , take sumatriptan as soon as onset and repeat a dose within 2 hours.  - SUMAtriptan (IMITREX) 50 MG tablet  Dispense: 30 tablet; Refill: 1    Consider neurology consult if further questions and concerns    Imaging of brain differed as no red flags identified, not the wrose headache, no other neurological symptoms of paralysis or loss of vision or balance   Anxiety  & Moderate episode of recurrent major depressive disorder (H)  Stable on current medications - no changes    Follow up earlier as needed  For questions about headache or medications       30 minutes spent on the date of the encounter doing chart review, history and exam, documentation and further activities as noted above           Return in about 4 weeks (around 3/16/2021) for concerns,unresolved.    Ursula Huang MD  [facility]   Eliezer is a 36 year old who presents for the following health issues     HPI       Headache- frontal   No associated tearing,  change in vision, balance , no recent head injury  Onset/Duration: on and off years, more frequent in jan 2021  Associated with  nausea ,history of  motion sickness   Description  Location: bilateral in the frontal area   Character: throbbing pain, sinus pressure  Frequency:  Most days of the week  Duration:  hours  Wake with headaches: no  Able to do daily activities when headache present: no   Intensity:  mild, moderate  Progression of Symptoms:  Worse on baugh day but otherwise waxing and waning  Accompanying signs and symptoms:  Stiff neck: YES  Neck or upper back pain: no  Sinus or URI symptoms YES  Fever: no  Nausea or vomiting: YES  Dizziness: no  Numbness/tingling: no  Weakness: no  Visual changes: none  History  Head trauma: no- fell [place] the bed in play [place]- very young decades ago  Family history of migraines: Sister has migraine headache  Daily pain medication use: YES- ibup / tyelnol/ over the counter migraine one  Previous tests for headaches: no  Neurologist evaluation: no  Precipitating or Alleviating factors (light/sound/sleep/caffeine): unknown  Therapies tried and outcome: rest hydration helped   and Ibuprofen (Advil, Motrin)   Over the counter nausea emitrol helped          Outcome - effective. Yoga routine 3/wk. Epsom salt baths to help  Frequent/daily pain medication use: no  Blood pressure cuff at home-Blood pressure is ok at home.  Sometimes daily headache that build up gradually and sometimes sudden headache , hit strong .  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 Wear blue glasses    Reviewed previous records and had history daily headache in 2018- & that improved on own.  Ibuprofen & tyelnol - variable relief of headache/ not taking either daily.    Problems with sinus , changed a lot, uses  neti pot, also vacuums/ clean bedroom, changed furnace filter, using humdifier, allergy pillow cases, flonase- all seem to help  Saline spray as well  Done  elimnation diet.Blue light blocking glasses.  Stress management.. weekly therapy  Pays attention to hydration   Sleeps sound , using  lavender oil      drinks only ,2 cups of caffeine a day    Review of Systems   Constitutional, HEENT, cardiovascular, pulmonary, GI, , musculoskeletal, neuro, skin, endocrine and psych systems are negative, except as otherwise noted.      Objective           Vitals:  No vitals were obtained today due to virtual visit.    Physical Exam   GENERAL: Healthy, alert and no distress  EYES: Eyes grossly normal to inspection.  No discharge or erythema, or obvious scleral/conjunctival abnormalities.  RESP: No audible wheeze, cough, or visible cyanosis.  No visible retractions or increased work of breathing.    SKIN: Visible skin clear. No significant rash, abnormal pigmentation or lesions.  NEURO: Cranial nerves grossly intact.  Mentation and speech appropriate for age.  PSYCH: Mentation appears normal, affect normal/bright, judgement and insight intact, normal speech and appearance well-groomed.  PHQ 7/12/2016 9/25/2019 11/9/2020   PHQ-9 Total Score 2 5 2   Q9: Thoughts of better off dead/self-harm past 2 weeks Not at all Not at all Not at all     RICHARD-7 SCORE 9/27/2018 9/25/2019 11/9/2020   Total Score - - -   Total Score 10 7 5                   Video-Visit Details    Type of service:  Video Visit    Video End Time:6:12 PM    Originating Location (pt. Location): Home    Distant Location (provider location):  [facility]     Platform used for Video Visit: [employer]

## 2021-02-25 PROBLEM — R51.9 CHRONIC DAILY HEADACHE: Status: ACTIVE | Noted: 2021-02-25

## 2021-02-25 PROBLEM — G43.109 MIGRAINE EQUIVALENT: Status: ACTIVE | Noted: 2021-02-25

## 2021-02-26 NOTE — PATIENT INSTRUCTIONS
1. Chronic daily headache    - amitriptyline (ELAVIL) 10 MG tablet; Take 1 tablet (10 mg) by mouth At Bedtime  Dispense: 30 tablet; Refill: 1  - NEUROLOGY ADULT REFERRAL    2. Migraine equivalent    - SUMAtriptan (IMITREX) 50 MG tablet; Take 1 tablet (50 mg) by mouth at onset of headache for migraine May repeat in 2 hours. Max 4 tablets/24 hours.  Dispense: 30 tablet; Refill: 1  - amitriptyline (ELAVIL) 10 MG tablet; Take 1 tablet (10 mg) by mouth At Bedtime  Dispense: 30 tablet; Refill: 1  - NEUROLOGY ADULT REFERRAL    3. Anxiety  4. Moderate episode of recurrent major depressive disorder (H)

## 2021-02-27 ENCOUNTER — MYC MEDICAL ADVICE (OUTPATIENT)
Dept: FAMILY MEDICINE | Facility: CLINIC | Age: 36
End: 2021-02-27

## 2021-03-18 ENCOUNTER — PRE VISIT (OUTPATIENT)
Dept: NEUROLOGY | Facility: CLINIC | Age: 36
End: 2021-03-18

## 2021-03-18 NOTE — TELEPHONE ENCOUNTER
FUTURE VISIT INFORMATION      FUTURE VISIT INFORMATION:    Date: 3/24/2021    Time: 930am    Location: [facility]  REFERRAL INFORMATION:    Referring provider:  Dr. Huang    Referring providers clinic:  [facility]     Reason for visit/diagnosis  Headaches     RECORDS REQUESTED FROM:       Clinic name Comments Records Status Imaging Status   Internal Dr. Huang-2/16/2021    KEVIN Jimenez-11/1/2018 Epic N/A

## 2021-03-24 ENCOUNTER — TELEPHONE (OUTPATIENT)
Dept: NEUROLOGY | Facility: CLINIC | Age: 36
End: 2021-03-24

## 2021-03-24 ENCOUNTER — VIRTUAL VISIT (OUTPATIENT)
Dept: NEUROLOGY | Facility: CLINIC | Age: 36
End: 2021-03-24
Attending: FAMILY MEDICINE
Payer: COMMERCIAL

## 2021-03-24 DIAGNOSIS — G43.709 CHRONIC MIGRAINE WITHOUT AURA WITHOUT STATUS MIGRAINOSUS, NOT INTRACTABLE: Primary | ICD-10-CM

## 2021-03-24 PROCEDURE — 99204 OFFICE O/P NEW MOD 45 MIN: CPT | Mod: 95 | Performed by: PSYCHIATRY & NEUROLOGY

## 2021-03-24 RX ORDER — TOPIRAMATE 25 MG/1
100 TABLET, FILM COATED ORAL EVERY EVENING
Qty: 120 TABLET | Refills: 3 | Status: SHIPPED | OUTPATIENT
Start: 2021-03-24 | End: 2022-05-27

## 2021-03-24 RX ORDER — NARATRIPTAN 2.5 MG/1
2.5 TABLET ORAL
Qty: 18 TABLET | Refills: 3 | Status: SHIPPED | OUTPATIENT
Start: 2021-03-24 | End: 2021-05-03

## 2021-03-24 RX ORDER — PROCHLORPERAZINE MALEATE 10 MG
10 TABLET ORAL EVERY 6 HOURS PRN
Qty: 20 TABLET | Refills: 3 | Status: SHIPPED | OUTPATIENT
Start: 2021-03-24 | End: 2022-09-20

## 2021-03-24 NOTE — TELEPHONE ENCOUNTER
Central Prior Authorization Team   [phone]    PA Initiation    Medication: naratriptan (AMERGE) 2.5 MG tablet  Insurance Company: [employer] - Phone [phone] Fax [phone]  Pharmacy Filling the Rx: [id number] - [place], [place] - [address] AT [facility] [address]  Filling Pharmacy Phone: [phone]  Filling Pharmacy Fax: [phone]  Start Date: 3/24/2021

## 2021-03-24 NOTE — TELEPHONE ENCOUNTER
"Prior Authorization Retail Medication Request    Medication/Dose: naratriptan (AMERGE) 2.5 MG tablet  ICD code (if different than what is on RX):  G43.709  Previously Tried and Failed:  He has previously tried propranolol with side effects, and currently takes low-dose amitriptyline, without benefit.  There is room to increase amitriptyline further, however, he is currently taking desvenlafaxine, and I worry about the potential for increasing his risk of side effects with escalating doses of multiple antidepressant medications.    Rationale:  He currently has headache 15 or maybe more/30 days per month. These can be severe, up to 3-4 severe headaches per week. They last up to 18 hours, average about 4 hours. They most commonly occur in the late afternoon/evening.   He takes ibuprofen, previously sumatriptan. After trying sumatriptan 5-6 times, he stopped taking it after it made him feel \"really bad\". He felt increased anxiety, almost like a panic attack, especially in his jaw and face, but also in his chest. His diastolic blood pressure was higher, up to 95-99, during migraine attacks and taking sumatriptan.      In the past, he has taken Excedrin OTC, Tylenol. He wears blue light blocking lenses. He has tried a muscle relaxer at night, prescription glasses, even though he doesn't need these.       He has previously taken propranolol for anxiety, which caused side effects    Insurance Name:  [employer]  Insurance ID:  [id number]       Pharmacy Information (if different than what is on RX)  Name:    Phone:      "

## 2021-03-24 NOTE — PROGRESS NOTES
Eliezer is a 36 year old who is being evaluated via a billable video visit.      How would you like to obtain your AVS? MyChart  If the video visit is dropped, the invitation should be resent by: Send to e-mail at: [email or web address]  Will anyone else be joining your video visit? No      Video-Visit Details    Type of service:  Video Visit    Originating Location (pt. Location): Home    Distant Location (provider location):  [facility] [place]     Platform used for Video Visit: [employer]     [facility]  Neurology Consult     Eliezer Ellis MRN# [id number]   YOB: 1985 Age: 36 year old     Requesting physician: Ursula Huang MD         Assessment and Recommendations:     Eliezer Ellis is a 36 year old male who presents for further evaluation of headache.    His headache presentation is consistent with chronic migraine without aura.  I suspect he has this on a genetic basis.  His virtual neurologic examination is intact today.  We discussed the possibility of an MRI of the brain, but I do not think that this is needed at this time.  If he were to develop new neurologic features, or if his headaches do not respond to treatment, then we could move forward with additional work-up.    For acute treatment of headache, I recommend the following:  -For acute min of mild headache, he will continue ibuprofen as needed, not to exceed more than 14 days/month to avoid medication overuse.  -For acute treatment of moderate to severe headache, recommend he stop sumatriptan and try naratriptan 2.5 mg at the onset of headache, with a repeat dose in 4 hours if needed.  Should not exceed more than 9 days/month to avoid medication overuse.  -For nausea related to headache, or as a rescue treatment for migraine, I recommend prochlorperazine 10 mg as needed, not to exceed more than 9 days/month to avoid medication side effects.    His headache  frequency and severity warrants prevention.  He has previously tried propranolol with side effects, and currently takes low-dose amitriptyline, without benefit.  There is room to increase amitriptyline further, however, he is currently taking desvenlafaxine, and I worry about the potential for increasing his risk of side effects with escalating doses of multiple antidepressant medications.    -I recommend that he stop amitriptyline and start topiramate at 25 mg nightly.  I recommend he increase topiramate by 25 mg weekly, as needed and tolerated, to a goal dose of 100 mg at night.  I recommend a 6 to 8-week trial prior to determining effectiveness.  Potential side effects were discussed.  -If topiramate is not tolerated or not effective after adequate trial, alternative options could include botulinum toxin injections using a chronic migraine protocol every 12 weeks, a CGRP monoclonal antibody, gabapentin, or verapamil.    In addition to prescription medication, he is interested in lifestyle management as well.  He has been making good choices, and I encouraged him to continue his healthy habits.  -In particular, we reviewed the importance of routine in sleep and diet, and making healthy food choices.  He has identified some foods that he does not tolerate, and I think it makes sense to avoid these.  We also discussed supplements, magnesium, riboflavin, co-Q10 as potential adjunct therapies.  -He will continue yoga 3 days/week.  We also briefly discussed meditation, acupuncture, progressive relaxation, biofeedback, cognitive behavioral therapy, which also have some evidence.    I will plan to see him back in 3 months to monitor his progress.  He will contact me in the meantime if there are any concerns.    I spent 56 minutes on record review, patient care, coordination of care, and documentation today.    Sol Linares MD  Neurology            Chief Complaint:     Chief Complaint   Patient presents with     Consult  "    VIDEO VISIT NEW           History is obtained from the patient and medical record.      Eliezer Ellis is a 36 year old male who has been living with headache since his early 20's, at least. He recalls seeking care in college and trying treatments. This year, he spoke with Dr. Huang about them after they got worse, including some time with daily headaches.     They are frontal and bilateral. He describes them as dull, aching, and pulsing, pressure when severe. He currently has headache 15 or maybe more/30 days per month. These can be severe, up to 3-4 severe headaches per week. They last up to 18 hours, average about 4 hours. They most commonly occur in the late afternoon/evening.     He has associated photophobia, phonophobia. He had associated nausea, no vomiting.     He denies typical aura. He denies a positional component to his pain. He denies unilateral autonomic features. He denies fevers or other illness.     He takes ibuprofen, previously sumatriptan. After trying sumatriptan 5-6 times, he stopped taking it after it made him feel \"really bad\". He felt increased anxiety, almost like a panic attack, especially in his jaw and face, but also in his chest. His diastolic blood pressure was higher, up to 95-99, during migraine attacks and taking sumatriptan.     He currently takes amitriptyline 10 mg nightly, which is not clearly helpful but has not caused side effects. He has been taking this for over a month.     In the past, he has taken Excedrin OTC, Tylenol. He wears blue light blocking lenses. He has tried a muscle relaxer at night, prescription glasses, even though he doesn't need these.      He has previously taken propranolol for anxiety, which caused side effects.            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          Social History:   He lives with his partner. He does not have children. 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 He is able to work from home. His headaches can affect his ability to work.     He denies smoking, denies alcohol use, and denies drug use. He drinks 1 cup of coffee daily.           Family History:   There is a family history of migraine in his sister, Barbara, who has had bad headaches for 10-15 years. She receives lidocaine intranasally and subcutaneous injections.   Family History   Problem Relation Age of Onset     Family History Negative Mother      Blood Disease Father 65        VTE, on coumadin             Allergies:      Allergies   Allergen Reactions     Penicillins              Medications:     Current Outpatient Medications:      allopurinol (ZYLOPRIM) 100 MG tablet, TAKE 1 TABLET BY MOUTH EVERY DAY, Disp: 30 tablet, Rfl: 0     ALPRAZolam (XANAX) 1 MG tablet, Take 1 tablet (1 mg) by mouth once as needed for anxiety, Disp: 15 tablet, Rfl: 0     amitriptyline (ELAVIL) 10 MG tablet, Take 1 tablet (10 mg) by mouth At Bedtime, Disp: 30 tablet, Rfl: 1     Ascorbic Acid (VITAMIN C PO), Take 2 chew tab by mouth daily Pt takes 2 Gummi tabs daily, Disp: , Rfl:      B Complex Vitamins (VITAMIN B COMPLEX PO), Take by mouth daily, Disp: , Rfl:      cetirizine (ZYRTEC) 10 MG tablet, Take 10 mg by mouth daily., Disp: , Rfl:      desvenlafaxine (PRISTIQ) 50 MG 24 hr tablet, Take 1 tablet (50 mg) by mouth daily, Disp: 30 tablet, Rfl: 11     fluocinonide (LIDEX) 0.05 % topical gel, Apply topically as needed, Disp: , Rfl:      IBUPROFEN PO, , Disp: , Rfl:      order for DME, Equipment  being ordered: digital Blood pressure apparatus, Disp: 1 Box, Rfl: 0     VITAMIN D PO, Take 1,000 Units by mouth daily Pt takes 2 tabs daily in the AM, Disp: , Rfl:      allopurinol (ZYLOPRIM) 100 MG tablet, Take 1 tablet (100 mg) by mouth daily (Patient not taking: Reported on 3/24/2021), Disp: 30 tablet, Rfl: 6     Multiple Vitamins-Minerals (MULTIVITAMIN ADULT PO), Take by mouth daily, Disp: , Rfl:           Physical Exam:   There were no vitals taken for this visit.   Physical Exam:   General: NAD  Neurologic:   Mental Status Exam: Alert, awake and oriented to situation. No dysarthria. Speech of normal fluency.   Cranial Nerves: EOMs intact, no nystagmus, facial movements symmetric, hearing intact to conversation, tongue midline and fully mobile. No tongue atrophy or fasciculations.    Motor: Able to hold arms outstretched.  Normal finger tapping bilaterally.  Able to stand from a chair without using arms to assist.  No tremors or abnormal movements noted.   Coordination: Able to touch nose with eyes closed from arms outstretched.  Normal finger tapping bilaterally.    Gait: Normal stance.  Neck: Normal range of motion with lateral head movements and neck flexion.          Data:     No previous head imaging.

## 2021-03-24 NOTE — TELEPHONE ENCOUNTER
PRIOR AUTHORIZATION DENIED    Medication: naratriptan (AMERGE) 2.5 MG tablet-PA DENIED     Denial Date: 3/24/2021    Denial Rational: Criteria Not Met. Insurance stated that patient must tried/failed 2 preferred drugs: Relpax (tablets), Rizatriptan (tablets, orally disintegrating tablets (ODT)).          Appeal Information:

## 2021-03-24 NOTE — LETTER
3/24/2021       RE: Eliezer Ellsi  [address]  [facility] [address]     Dear Colleague,    Thank you for referring your patient, Eliezer Ellis, to the [facility] [place] at [facility]. Please see a copy of my visit note below.    Eliezer is a 36 year old who is being evaluated via a billable video visit.      How would you like to obtain your AVS? MyChart  If the video visit is dropped, the invitation should be resent by: Send to e-mail at: [email or web address]  Will anyone else be joining your video visit? No      Video-Visit Details    Type of service:  Video Visit    Originating Location (pt. Location): Home    Distant Location (provider location):  [facility] [place]     Platform used for Video Visit: [employer]     [facility]  Neurology Consult     Eliezer Ellis MRN# [id number]   YOB: 1985 Age: 36 year old     Requesting physician: Ursula Huang MD         Assessment and Recommendations:     Eliezer Ellis is a 36 year old male who presents for further evaluation of headache.    His headache presentation is consistent with chronic migraine without aura.  I suspect he has this on a genetic basis.  His virtual neurologic examination is intact today.  We discussed the possibility of an MRI of the brain, but I do not think that this is needed at this time.  If he were to develop new neurologic features, or if his headaches do not respond to treatment, then we could move forward with additional work-up.    For acute treatment of headache, I recommend the following:  -For acute min of mild headache, he will continue ibuprofen as needed, not to exceed more than 14 days/month to avoid medication overuse.  -For acute treatment of moderate to severe headache, recommend he stop sumatriptan and try naratriptan 2.5 mg at the onset of headache, with  a repeat dose in 4 hours if needed.  Should not exceed more than 9 days/month to avoid medication overuse.  -For nausea related to headache, or as a rescue treatment for migraine, I recommend prochlorperazine 10 mg as needed, not to exceed more than 9 days/month to avoid medication side effects.    His headache frequency and severity warrants prevention.  He has previously tried propranolol with side effects, and currently takes low-dose amitriptyline, without benefit.  There is room to increase amitriptyline further, however, he is currently taking desvenlafaxine, and I worry about the potential for increasing his risk of side effects with escalating doses of multiple antidepressant medications.    -I recommend that he stop amitriptyline and start topiramate at 25 mg nightly.  I recommend he increase topiramate by 25 mg weekly, as needed and tolerated, to a goal dose of 100 mg at night.  I recommend a 6 to 8-week trial prior to determining effectiveness.  Potential side effects were discussed.  -If topiramate is not tolerated or not effective after adequate trial, alternative options could include botulinum toxin injections using a chronic migraine protocol every 12 weeks, a CGRP monoclonal antibody, gabapentin, or verapamil.    In addition to prescription medication, he is interested in lifestyle management as well.  He has been making good choices, and I encouraged him to continue his healthy habits.  -In particular, we reviewed the importance of routine in sleep and diet, and making healthy food choices.  He has identified some foods that he does not tolerate, and I think it makes sense to avoid these.  We also discussed supplements, magnesium, riboflavin, co-Q10 as potential adjunct therapies.  -He will continue yoga 3 days/week.  We also briefly discussed meditation, acupuncture, progressive relaxation, biofeedback, cognitive behavioral therapy, which also have some evidence.    I will plan to see him back in  "3 months to monitor his progress.  He will contact me in the meantime if there are any concerns.    I spent 56 minutes on record review, patient care, coordination of care, and documentation today.    Sol Linares MD  Neurology            Chief Complaint:     Chief Complaint   Patient presents with     Consult     VIDEO VISIT NEW           History is obtained from the patient and medical record.      Eliezer Ellis is a 36 year old male who has been living with headache since his early 20's, at least. He recalls seeking care in college and trying treatments. This year, he spoke with Dr. Huang about them after they got worse, including some time with daily headaches.     They are frontal and bilateral. He describes them as dull, aching, and pulsing, pressure when severe. He currently has headache 15 or maybe more/30 days per month. These can be severe, up to 3-4 severe headaches per week. They last up to 18 hours, average about 4 hours. They most commonly occur in the late afternoon/evening.     He has associated photophobia, phonophobia. He had associated nausea, no vomiting.     He denies typical aura. He denies a positional component to his pain. He denies unilateral autonomic features. He denies fevers or other illness.     He takes ibuprofen, previously sumatriptan. After trying sumatriptan 5-6 times, he stopped taking it after it made him feel \"really bad\". He felt increased anxiety, almost like a panic attack, especially in his jaw and face, but also in his chest. His diastolic blood pressure was higher, up to 95-99, during migraine attacks and taking sumatriptan.     He currently takes amitriptyline 10 mg nightly, which is not clearly helpful but has not caused side effects. He has been taking this for over a month.     In the past, he has taken Excedrin OTC, Tylenol. He wears blue light blocking lenses. He has tried a muscle relaxer at night, prescription glasses, even though he doesn't need these.      He " has previously taken propranolol for anxiety, which caused side effects.            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          Social History:   He lives with his partner. He does not have children. 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 He is able to work from home. His headaches can affect his ability to work.     He denies smoking, denies alcohol use, and denies drug use. He drinks 1 cup of coffee daily.           Family History:   There is a family history of migraine in his sister, Barbara, who has had bad headaches for 10-15 years. She receives lidocaine intranasally and subcutaneous injections.   Family History   Problem Relation Age of Onset     Family History Negative Mother      Blood Disease Father 65        VTE, on coumadin             Allergies:      Allergies   Allergen Reactions     Penicillins              Medications:     Current Outpatient Medications:      allopurinol (ZYLOPRIM) 100 MG tablet, TAKE 1 TABLET BY MOUTH EVERY DAY, Disp: 30 tablet, Rfl: 0     ALPRAZolam (XANAX) 1 MG tablet, Take 1 tablet (1 mg) by mouth once as needed for anxiety, Disp: 15 tablet, Rfl: 0     amitriptyline (ELAVIL) 10 MG tablet, Take 1 tablet (10 mg) by mouth At Bedtime, Disp: 30 tablet, Rfl: 1     Ascorbic Acid (VITAMIN C PO), Take 2 chew tab by mouth daily Pt takes 2 Gummi tabs daily, Disp: , Rfl:      B Complex Vitamins (VITAMIN B COMPLEX PO), Take by mouth daily, Disp: , Rfl:      cetirizine (ZYRTEC) 10 MG  tablet, Take 10 mg by mouth daily., Disp: , Rfl:      desvenlafaxine (PRISTIQ) 50 MG 24 hr tablet, Take 1 tablet (50 mg) by mouth daily, Disp: 30 tablet, Rfl: 11     fluocinonide (LIDEX) 0.05 % topical gel, Apply topically as needed, Disp: , Rfl:      IBUPROFEN PO, , Disp: , Rfl:      order for DME, Equipment being ordered: digital Blood pressure apparatus, Disp: 1 Box, Rfl: 0     VITAMIN D PO, Take 1,000 Units by mouth daily Pt takes 2 tabs daily in the AM, Disp: , Rfl:      allopurinol (ZYLOPRIM) 100 MG tablet, Take 1 tablet (100 mg) by mouth daily (Patient not taking: Reported on 3/24/2021), Disp: 30 tablet, Rfl: 6     Multiple Vitamins-Minerals (MULTIVITAMIN ADULT PO), Take by mouth daily, Disp: , Rfl:           Physical Exam:   There were no vitals taken for this visit.   Physical Exam:   General: NAD  Neurologic:   Mental Status Exam: Alert, awake and oriented to situation. No dysarthria. Speech of normal fluency.   Cranial Nerves: EOMs intact, no nystagmus, facial movements symmetric, hearing intact to conversation, tongue midline and fully mobile. No tongue atrophy or fasciculations.    Motor: Able to hold arms outstretched.  Normal finger tapping bilaterally.  Able to stand from a chair without using arms to assist.  No tremors or abnormal movements noted.   Coordination: Able to touch nose with eyes closed from arms outstretched.  Normal finger tapping bilaterally.    Gait: Normal stance.  Neck: Normal range of motion with lateral head movements and neck flexion.          Data:     No previous head imaging.       Again, thank you for allowing me to participate in the care of your patient.      Sincerely,    Sol Linares MD

## 2021-03-25 DIAGNOSIS — G43.709 CHRONIC MIGRAINE WITHOUT AURA WITHOUT STATUS MIGRAINOSUS, NOT INTRACTABLE: Primary | ICD-10-CM

## 2021-03-25 RX ORDER — RIZATRIPTAN BENZOATE 5 MG/1
5-10 TABLET ORAL
Qty: 18 TABLET | Refills: 3 | Status: SHIPPED | OUTPATIENT
Start: 2021-03-25 | End: 2022-09-20

## 2021-04-17 ENCOUNTER — HEALTH MAINTENANCE LETTER (OUTPATIENT)
Age: 36
End: 2021-04-17

## 2021-04-29 ENCOUNTER — DOCUMENTATION ONLY (OUTPATIENT)
Dept: FAMILY MEDICINE | Facility: CLINIC | Age: 36
End: 2021-04-29

## 2021-04-29 DIAGNOSIS — Z00.00 ROUTINE HISTORY AND PHYSICAL EXAMINATION OF ADULT: Primary | ICD-10-CM

## 2021-04-29 NOTE — PROGRESS NOTES
Labs are extended. Please draw rainbow tubes in case after physical patient need more tests. Thanks

## 2021-04-30 DIAGNOSIS — R79.89 HIGH SERUM THYROID STIMULATING HORMONE (TSH): ICD-10-CM

## 2021-04-30 DIAGNOSIS — M1A.4720 CHRONIC GOUT DUE TO OTHER SECONDARY CAUSE INVOLVING TOE OF LEFT FOOT WITHOUT TOPHUS: ICD-10-CM

## 2021-04-30 DIAGNOSIS — Z00.00 ROUTINE HISTORY AND PHYSICAL EXAMINATION OF ADULT: ICD-10-CM

## 2021-04-30 DIAGNOSIS — E78.2 MIXED HYPERLIPIDEMIA: ICD-10-CM

## 2021-04-30 LAB
ANION GAP SERPL CALCULATED.3IONS-SCNC: 5 MMOL/L (ref 3–14)
BUN SERPL-MCNC: 15 MG/DL (ref 7–30)
CALCIUM SERPL-MCNC: 8.9 MG/DL (ref 8.5–10.1)
CHLORIDE SERPL-SCNC: 109 MMOL/L (ref 94–109)
CHOLEST SERPL-MCNC: 237 MG/DL
CO2 SERPL-SCNC: 26 MMOL/L (ref 20–32)
CREAT SERPL-MCNC: 0.98 MG/DL (ref 0.66–1.25)
GFR SERPL CREATININE-BSD FRML MDRD: >90 ML/MIN/{1.73_M2}
GLUCOSE SERPL-MCNC: 88 MG/DL (ref 70–99)
HDLC SERPL-MCNC: 36 MG/DL
LDLC SERPL CALC-MCNC: 165 MG/DL
NONHDLC SERPL-MCNC: 201 MG/DL
POTASSIUM SERPL-SCNC: 4.2 MMOL/L (ref 3.4–5.3)
SODIUM SERPL-SCNC: 140 MMOL/L (ref 133–144)
TRIGL SERPL-MCNC: 179 MG/DL
TSH SERPL DL<=0.005 MIU/L-ACNC: 5.82 MU/L (ref 0.4–4)
URATE SERPL-MCNC: 4.8 MG/DL (ref 3.5–7.2)

## 2021-04-30 PROCEDURE — 84443 ASSAY THYROID STIM HORMONE: CPT | Performed by: FAMILY MEDICINE

## 2021-04-30 PROCEDURE — 80048 BASIC METABOLIC PNL TOTAL CA: CPT | Performed by: FAMILY MEDICINE

## 2021-04-30 PROCEDURE — 84439 ASSAY OF FREE THYROXINE: CPT | Performed by: FAMILY MEDICINE

## 2021-04-30 PROCEDURE — 36415 COLL VENOUS BLD VENIPUNCTURE: CPT | Performed by: FAMILY MEDICINE

## 2021-04-30 PROCEDURE — 80061 LIPID PANEL: CPT | Performed by: FAMILY MEDICINE

## 2021-04-30 PROCEDURE — 84550 ASSAY OF BLOOD/URIC ACID: CPT | Performed by: FAMILY MEDICINE

## 2021-04-30 NOTE — PATIENT INSTRUCTIONS
Mixed hyperlipidemia  Diet controlled  encouraged to add over the counter omega 3 and regular excercise       Other specified hypothyroidism  -   We had a long conversation about on going fatigue and abnormal TSH  He is interested in trying a small dose  Of levothyroxine 35 mcg once daily and recheck level in 6 weeks  Meanwhile will keep us posted-if any concerns with levothyroxine      **TSH with free T4 reflex FUTURE anytime; Future      Preventive Health Recommendations  Male Ages 26 - 39    Yearly exam:             See your health care provider every year in order to  o   Review health changes.   o   Discuss preventive care.    o   Review your medicines if your doctor has prescribed any.    You should be tested each year for STDs (sexually transmitted diseases), if you re at risk.     After age 35, talk to your provider about cholesterol testing. If you are at risk for heart disease, have your cholesterol tested at least every 5 years.     If you are at risk for diabetes, you should have a diabetes test (fasting glucose).  Shots: Get a flu shot each year. Get a tetanus shot every 10 years.     Nutrition:    Eat at least 5 servings of fruits and vegetables daily.     Eat whole-grain bread, whole-wheat pasta and brown rice instead of white grains and rice.     Get adequate Calcium and Vitamin D.     Lifestyle    Exercise for at least 150 minutes a week (30 minutes a day, 5 days a week). This will help you control your weight and prevent disease.     Limit alcohol to one drink per day.     No smoking.     Wear sunscreen to prevent skin cancer.     See your dentist every six months for an exam and cleaning.

## 2021-04-30 NOTE — PROGRESS NOTES
SUBJECTIVE:   CC: Eliezer Ellis is an 36 year old male who presents for preventative health visit.       Patient has been advised of split billing requirements and indicates understanding: Yes  Healthy Habits:     Getting at least 3 servings of Calcium per day:  Yes    Bi-annual eye exam:  NO    Dental care twice a year:  NO    Sleep apnea or symptoms of sleep apnea:  None    Diet:  Regular (no restrictions) and Other    Frequency of exercise:  2-3 days/week    Duration of exercise:  Less than 15 minutes    Taking medications regularly:  Yes    Medication side effects:  Muscle aches and Other    PHQ-2 Total Score: 0    Additional concerns today:  No    Chronic migraine headache.  He reports he has 2  rescue medications- rizatriptan helps. Has not used prochlorperazine. Propranolol did not work for migraine prophylaxis .  Has been on Topamax Topamax-helps with migraine but causing signifcant interruption with sleep. Has plans on tapering off under care of neurologist.   sees neurologist.    Depression and anxiety- current medications has suited the best  Weekly therapy helps as well  Wondering about doses  Has been fatigue for while and wondering about TSH that's been elevated for past 8 yrs    We discussed hyperlipidemia and he is willing to start some excercise and keep watching diet     PROBLEMS TO ADD ON...    Today's PHQ-2 Score:   PHQ-2 ( 1999 [employer]) 4/29/2021   Q1: Little interest or pleasure in doing things 0   Q2: Feeling down, depressed or hopeless 0   PHQ-2 Score 0   Q1: Little interest or pleasure in doing things Not at all   Q2: Feeling down, depressed or hopeless Not at all   PHQ-2 Score 0       Abuse: Current or Past(Physical, Sexual or Emotional)- No  Do you feel safe in your environment? Yes    Have you ever done Advance Care Planning? (For example, a Health Directive, POLST, or a discussion with a medical provider or your loved ones about your wishes): No, advance care planning information given to  patient to review.  Advanced care planning was discussed at today's visit.    Social History     Tobacco Use     Smoking status: Never Smoker     Smokeless tobacco: Never Used   Substance Use Topics     Alcohol use: Not Currently     Alcohol/week: 0.0 standard drinks     Comment: 2-3x week     If you drink alcohol do you typically have >3 drinks per day or >7 drinks per week? No    No flowsheet data found.    Last PSA: No results found for: PSA    Reviewed orders with patient. Reviewed health maintenance and updated orders accordingly - Yes  BP Readings from Last 3 Encounters:   05/03/21 126/88   05/06/19 115/79   12/05/18 129/85    Wt Readings from Last 3 Encounters:   05/03/21 112.7 kg (248 lb 6.4 oz)   05/06/19 112 kg (246 lb 14.4 oz)   12/05/18 116.6 kg (257 lb 1.6 oz)                    Reviewed and updated as needed this visit by clinical staff  Tobacco  Allergies  Meds  Problems  Med Hx  Surg Hx  Fam Hx  Soc Hx          Reviewed and updated as needed this visit by Provider  Tobacco  Allergies  Meds  Problems  Med Hx  Surg Hx  Fam Hx             Review of Systems  CONSTITUTIONAL: NEGATIVE for fever, chills, change in weight  INTEGUMENTARY/SKIN: NEGATIVE for worrisome rashes, moles or lesions  EYES: NEGATIVE for vision changes or irritation  ENT: NEGATIVE for ear, mouth and throat problems  RESP: NEGATIVE for significant cough or SOB  CV: NEGATIVE for chest pain, palpitations or peripheral edema  GI: NEGATIVE for nausea, abdominal pain, heartburn, or change in bowel habits   male: negative for dysuria, hematuria, decreased urinary stream, erectile dysfunction, urethral discharge  MUSCULOSKELETAL: NEGATIVE for significant arthralgias or myalgia  NEURO: NEGATIVE for weakness, dizziness or paresthesias  PSYCHIATRIC: NEGATIVE for changes in mood or affect    OBJECTIVE:   /88   Pulse 94   Resp 16   Ht 1.829 m (6')   Wt 112.7 kg (248 lb 6.4 oz)   SpO2 98%   BMI 33.69 kg/m      Physical  Exam  GENERAL: healthy, alert and no distress  NECK: no adenopathy, no asymmetry, masses, or scars and thyroid normal to palpation  RESP: lungs clear to auscultation - no rales, rhonchi or wheezes  CV: regular rate and rhythm, normal S1 S2, no S3 or S4, no murmur, click or rub, no peripheral edema and peripheral pulses strong  ABDOMEN: soft, nontender, no hepatosplenomegaly, no masses and bowel sounds normal  MS: no gross musculoskeletal defects noted, no edema  SKIN: no suspicious lesions or rashes  NEURO: Normal strength and tone, mentation intact and speech normal  PSYCH: mentation appears normal, affect normal/bright    Diagnostic Test Results:  Labs reviewed in Epic  Reviewed with patient     ASSESSMENT/PLAN:   Eliezer was seen today for physical.    Diagnoses and all orders for this visit:    Routine general medical examination at a health care facility    Moderate episode of recurrent major depressive disorder (H)  PHQ 9/25/2019 11/9/2020 5/3/2021   PHQ-9 Total Score 5 2 2   Q9: Thoughts of better off dead/self-harm past 2 weeks Not at all Not at all Not at all   for now- will leave at the same dose & medications refilled for 1 yr  continue with counseling.      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  UA- stable  No acute episodes  Medications refilled for 1 yr   -     allopurinol (ZYLOPRIM) 100 MG tablet; Take 1 tablet (100 mg) by mouth daily    Mixed hyperlipidemia  Diet controlled  encouraged to add over the counter omega 3 and regular excercise      Recent Labs   Lab Test 04/30/21  1042 01/10/14  1027 11/01/13  1109   CHOL 237* 249* 253*   HDL 36* 34* 37*   * 159* 186*   TRIG 179* 283* 151*   CHOLHDLRATIO  --  7.4* 6.9*       Other specified hypothyroidism  -   We had a long conversation about on going fatigue and abnormal TSH  He is interested in trying a small dose  Of levothyroxine 35 mcg once daily and recheck level in 6 weeks  Meanwhile will keep us posted-if any concerns with levothyroxine      **TSH with free T4 reflex FUTURE anytime; Future    High serum thyroid stimulating hormone (TSH)  For past 8 yrs-  Lab Results   Component Value Date    TSH 5.82 04/30/2021       -     **TSH with free T4 reflex FUTURE anytime; Future  -     levothyroxine (SYNTHROID/LEVOTHROID) 50 MCG tablet; Take 0.5 tablets (25 mcg) by mouth daily    Other fatigue  Multifactorial   -     Discontinue: levothyroxine (SYNTHROID/LEVOTHROID) 50 MCG tablet; Take 0.5 tablets (25 mcg) by mouth daily  -     **TSH with free T4 reflex FUTURE anytime; Future  -     levothyroxine (SYNTHROID/LEVOTHROID) 50 MCG tablet; Take 0.5 tablets (25 mcg) by mouth daily    Chronic migraine without aura without status migrainosus, not intractable  Comments:under care of neurologist   Topamax-helps with migraine but causing signifcant interruption with sleep. Has plans on tapering off     BMI 33.0-33.9,adult        Patient has been advised of split billing requirements and indicates understanding: Yes  COUNSELING:   Reviewed preventive health counseling, as reflected in patient instructions       Regular exercise       Healthy diet/nutrition       Advance Care Planning    Estimated body mass index is 33.69 kg/m  as calculated from the following:    Height as of this encounter: 1.829 m (6').    Weight as of this encounter: 112.7 kg (248 lb 6.4 oz).     Weight management plan: Discussed healthy diet and exercise guidelines    He reports that he has never smoked. He has never used smokeless  tobacco.      Counseling Resources:  ATP IV Guidelines  Pooled Cohorts Equation Calculator  FRAX Risk Assessment  ICSI Preventive Guidelines  Dietary Guidelines for Americans, 2010  [employer]'s MyPlate  ASA Prophylaxis  Lung CA Screening    Ursula Huang MD  [facility]

## 2021-05-01 LAB — T4 FREE SERPL-MCNC: 0.79 NG/DL (ref 0.76–1.46)

## 2021-05-01 NOTE — RESULT ENCOUNTER NOTE
Hi    -LDL(bad) cholesterol level is elevated about same as last year  your triglycerides are elevated , a bit better than last year   Good cholesterol is still low & these factors  can increase heart disease risk.    We will talk more about diet, excercise  &benefits omega 3 fatty acids (fish oil) 4780-5919 mg daily are helpful to improve this.  -Kidney function is normal (Cr, GFR), Sodium is normal, Potassium is normal, Calcium is normal, Glucose is normal.   -Uric acid is normal  -TSH  Is not any higher than last yr    We will talk more at the appointment this Monday

## 2021-05-03 ENCOUNTER — OFFICE VISIT (OUTPATIENT)
Dept: FAMILY MEDICINE | Facility: CLINIC | Age: 36
End: 2021-05-03
Payer: COMMERCIAL

## 2021-05-03 VITALS
SYSTOLIC BLOOD PRESSURE: 126 MMHG | HEIGHT: 72 IN | DIASTOLIC BLOOD PRESSURE: 88 MMHG | HEART RATE: 94 BPM | BODY MASS INDEX: 33.64 KG/M2 | WEIGHT: 248.4 LBS | RESPIRATION RATE: 16 BRPM | OXYGEN SATURATION: 98 %

## 2021-05-03 DIAGNOSIS — E03.8 OTHER SPECIFIED HYPOTHYROIDISM: ICD-10-CM

## 2021-05-03 DIAGNOSIS — E78.2 MIXED HYPERLIPIDEMIA: ICD-10-CM

## 2021-05-03 DIAGNOSIS — G43.709 CHRONIC MIGRAINE WITHOUT AURA WITHOUT STATUS MIGRAINOSUS, NOT INTRACTABLE: ICD-10-CM

## 2021-05-03 DIAGNOSIS — F41.9 ANXIETY: ICD-10-CM

## 2021-05-03 DIAGNOSIS — R79.89 HIGH SERUM THYROID STIMULATING HORMONE (TSH): ICD-10-CM

## 2021-05-03 DIAGNOSIS — R53.83 OTHER FATIGUE: ICD-10-CM

## 2021-05-03 DIAGNOSIS — Z00.00 ROUTINE GENERAL MEDICAL EXAMINATION AT A HEALTH CARE FACILITY: Primary | ICD-10-CM

## 2021-05-03 DIAGNOSIS — M1A.4720 CHRONIC GOUT DUE TO OTHER SECONDARY CAUSE INVOLVING TOE OF LEFT FOOT WITHOUT TOPHUS: ICD-10-CM

## 2021-05-03 DIAGNOSIS — F33.1 MODERATE EPISODE OF RECURRENT MAJOR DEPRESSIVE DISORDER (H): ICD-10-CM

## 2021-05-03 PROCEDURE — 99395 PREV VISIT EST AGE 18-39: CPT | Performed by: FAMILY MEDICINE

## 2021-05-03 PROCEDURE — 99213 OFFICE O/P EST LOW 20 MIN: CPT | Mod: 25 | Performed by: FAMILY MEDICINE

## 2021-05-03 PROCEDURE — 96127 BRIEF EMOTIONAL/BEHAV ASSMT: CPT | Performed by: FAMILY MEDICINE

## 2021-05-03 RX ORDER — ALLOPURINOL 100 MG/1
100 TABLET ORAL DAILY
Qty: 30 TABLET | Refills: 11 | Status: SHIPPED | OUTPATIENT
Start: 2021-05-03 | End: 2022-05-27

## 2021-05-03 RX ORDER — LEVOTHYROXINE SODIUM 50 UG/1
25 TABLET ORAL DAILY
Qty: 30 TABLET | Refills: 4 | Status: SHIPPED | OUTPATIENT
Start: 2021-05-03 | End: 2021-05-03

## 2021-05-03 RX ORDER — LEVOTHYROXINE SODIUM 50 UG/1
25 TABLET ORAL DAILY
Qty: 30 TABLET | Refills: 4 | Status: SHIPPED | OUTPATIENT
Start: 2021-05-03 | End: 2022-03-18

## 2021-05-03 RX ORDER — DESVENLAFAXINE 50 MG/1
50 TABLET, FILM COATED, EXTENDED RELEASE ORAL DAILY
Qty: 30 TABLET | Refills: 11 | Status: SHIPPED | OUTPATIENT
Start: 2021-05-03 | End: 2021-07-19

## 2021-05-03 ASSESSMENT — ANXIETY QUESTIONNAIRES
1. FEELING NERVOUS, ANXIOUS, OR ON EDGE: SEVERAL DAYS
GAD7 TOTAL SCORE: 5
5. BEING SO RESTLESS THAT IT IS HARD TO SIT STILL: NOT AT ALL
2. NOT BEING ABLE TO STOP OR CONTROL WORRYING: SEVERAL DAYS
IF YOU CHECKED OFF ANY PROBLEMS ON THIS QUESTIONNAIRE, HOW DIFFICULT HAVE THESE PROBLEMS MADE IT FOR YOU TO DO YOUR WORK, TAKE CARE OF THINGS AT HOME, OR GET ALONG WITH OTHER PEOPLE: NOT DIFFICULT AT ALL
7. FEELING AFRAID AS IF SOMETHING AWFUL MIGHT HAPPEN: NOT AT ALL
3. WORRYING TOO MUCH ABOUT DIFFERENT THINGS: SEVERAL DAYS
6. BECOMING EASILY ANNOYED OR IRRITABLE: SEVERAL DAYS

## 2021-05-03 ASSESSMENT — PATIENT HEALTH QUESTIONNAIRE - PHQ9
5. POOR APPETITE OR OVEREATING: SEVERAL DAYS
SUM OF ALL RESPONSES TO PHQ QUESTIONS 1-9: 2
SUM OF ALL RESPONSES TO PHQ QUESTIONS 1-9: 2
10. IF YOU CHECKED OFF ANY PROBLEMS, HOW DIFFICULT HAVE THESE PROBLEMS MADE IT FOR YOU TO DO YOUR WORK, TAKE CARE OF THINGS AT HOME, OR GET ALONG WITH OTHER PEOPLE: NOT DIFFICULT AT ALL

## 2021-05-03 ASSESSMENT — MIFFLIN-ST. JEOR: SCORE: 2084.74

## 2021-05-04 ASSESSMENT — ANXIETY QUESTIONNAIRES: GAD7 TOTAL SCORE: 5

## 2021-06-14 ENCOUNTER — ANCILLARY PROCEDURE (OUTPATIENT)
Dept: GENERAL RADIOLOGY | Facility: CLINIC | Age: 36
End: 2021-06-14
Attending: FAMILY MEDICINE
Payer: COMMERCIAL

## 2021-06-14 ENCOUNTER — OFFICE VISIT (OUTPATIENT)
Dept: FAMILY MEDICINE | Facility: CLINIC | Age: 36
End: 2021-06-14
Payer: COMMERCIAL

## 2021-06-14 VITALS
RESPIRATION RATE: 16 BRPM | HEART RATE: 78 BPM | HEIGHT: 72 IN | SYSTOLIC BLOOD PRESSURE: 120 MMHG | WEIGHT: 250 LBS | OXYGEN SATURATION: 98 % | TEMPERATURE: 96.4 F | BODY MASS INDEX: 33.86 KG/M2 | DIASTOLIC BLOOD PRESSURE: 86 MMHG

## 2021-06-14 DIAGNOSIS — E03.8 OTHER SPECIFIED HYPOTHYROIDISM: ICD-10-CM

## 2021-06-14 DIAGNOSIS — R79.89 HIGH SERUM THYROID STIMULATING HORMONE (TSH): ICD-10-CM

## 2021-06-14 DIAGNOSIS — R07.81 RIB PAIN ON RIGHT SIDE: Primary | ICD-10-CM

## 2021-06-14 DIAGNOSIS — R53.83 OTHER FATIGUE: ICD-10-CM

## 2021-06-14 DIAGNOSIS — E78.2 MIXED HYPERLIPIDEMIA: ICD-10-CM

## 2021-06-14 LAB
CHOLEST SERPL-MCNC: 313 MG/DL
HDLC SERPL-MCNC: 42 MG/DL
LDLC SERPL CALC-MCNC: 223 MG/DL
NONHDLC SERPL-MCNC: 271 MG/DL
T4 FREE SERPL-MCNC: 0.82 NG/DL (ref 0.76–1.46)
TRIGL SERPL-MCNC: 242 MG/DL
TSH SERPL DL<=0.005 MIU/L-ACNC: 4.91 MU/L (ref 0.4–4)

## 2021-06-14 PROCEDURE — 71101 X-RAY EXAM UNILAT RIBS/CHEST: CPT | Mod: RT | Performed by: RADIOLOGY

## 2021-06-14 PROCEDURE — 80061 LIPID PANEL: CPT | Performed by: FAMILY MEDICINE

## 2021-06-14 PROCEDURE — 84439 ASSAY OF FREE THYROXINE: CPT | Performed by: FAMILY MEDICINE

## 2021-06-14 PROCEDURE — 84443 ASSAY THYROID STIM HORMONE: CPT | Performed by: FAMILY MEDICINE

## 2021-06-14 PROCEDURE — 36415 COLL VENOUS BLD VENIPUNCTURE: CPT | Performed by: FAMILY MEDICINE

## 2021-06-14 PROCEDURE — 99214 OFFICE O/P EST MOD 30 MIN: CPT | Performed by: FAMILY MEDICINE

## 2021-06-14 RX ORDER — CYCLOBENZAPRINE HCL 10 MG
10 TABLET ORAL
Qty: 20 TABLET | Refills: 0 | Status: SHIPPED | OUTPATIENT
Start: 2021-06-14 | End: 2022-06-13

## 2021-06-14 ASSESSMENT — MIFFLIN-ST. JEOR: SCORE: 2091.99

## 2021-06-14 NOTE — PROGRESS NOTES
Assessment & Plan     Rib pain on right side, secondary to  Injury from the accidental fall.  - XR Ribs & Chest Right G/E 3 Views- no fractures. Official report is pending   Advised relative rest, warm pack as needed  , NSAID as needed   Discussed with patient . Take ibuprofen with meals 600 mg unit(s) to three times daily as needed  For next 3-5 days  Muscle relaxant only as needed  Bedtime       - cyclobenzaprine (FLEXERIL) 10 MG tablet; Take 1 tablet (10 mg) by mouth nightly as needed for muscle spasms  Potential medication side effects were discussed with the patient; let me know if any occur.  Follow up in 2-3 weeks as needed      High serum thyroid stimulating hormone (TSH)  - **TSH with free T4 reflex FUTURE anytime    Other fatigue  Has been on levothyroxine 25 mcg about 6 weeks   Fatigue not any worse  No side effects from the meds   - **TSH with free T4 reflex FUTURE anytime    Other specified hypothyroidism  - **TSH with free T4 reflex FUTURE anytime    Mixed hyperlipidemia  - has started taking omega 3 for past few week  - tries to watch diet but without substantial changes.  -home cooking more but also more processed and easier stuff.  Lipid Profile (Chol, Trig, HDL, LDL calc)  Recent Labs   Lab Test 04/30/21  1042 01/10/14  1027 11/01/13  1109   CHOL 237* 249* 253*   HDL 36* 34* 37*   * 159* 186*   TRIG 179* 283* 151*   CHOLHDLRATIO  --  7.4* 6.9*     Wt Readings from Last 5 Encounters:   06/14/21 113.4 kg (250 lb)   05/03/21 112.7 kg (248 lb 6.4 oz)   05/06/19 112 kg (246 lb 14.4 oz)   12/05/18 116.6 kg (257 lb 1.6 oz)   11/01/18 121 kg (266 lb 11.2 oz)        33 minutes spent on the date of the encounter doing chart review, history and exam, documentation and further activities per the note           Return in about 6 weeks (around 7/26/2021) for concerns,unresolved, virtual/video visit.    Ursula Huang MD  [facility]    Sowmya Martins is a 36 year old who  presents for the following health issues     HPI     Musculoskeletal problem/pain  Onset/Duration: 3 days   Description  Location: side of abdomen - right-accidental fall- a whole in the pavement and hit hard- bruised knee and wrist but most brunt on the right side of the chest wall.  Joint Swelling: YES  Redness: YES  Pain: YES  Warmth: no  Intensity:  moderate  Progression of Symptoms:  improving and intermittent  Accompanying signs and symptoms:   Fevers: no  Numbness/tingling/weakness: no  History  Trauma to the area: YES- pt had a fall   Recent illness:  no  Previous similar problem: no  Previous evaluation:  no  Precipitating or alleviating factors:  Aggravating factors include: sitting up and down, laying, sneeze, twist, breathing deeply, and laughing   Therapies tried and outcome: ibuprofen     Has been fasting for labs to get fasting lipid rechecked  Has made some changes- cooking at home than eating out but again cooks quick easy meals and more processed.  Has been on levothyroxine for past 6 weeks, no side effects from  Medications  Unsure if fatigue changed and its certainly not any owrse      Review of Systems as above         Objective    /86   Pulse 78   Temp 96.4  F (35.8  C) (Tympanic)   Resp 16   Ht 1.829 m (6')   Wt 113.4 kg (250 lb)   SpO2 98%   BMI 33.91 kg/m    Body mass index is 33.91 kg/m .  Physical Exam   GENERAL: healthy, alert and no distress  NECK: no adenopathy, no asymmetry, masses, or scars and thyroid normal to palpation  RESP: lungs clear to auscultation - no rales, rhonchi or wheezes  CV: regular rate and rhythm, normal S1 S2, no S3 or S4, no murmur, click or rub, no peripheral edema and peripheral pulses strong  MS: bruising over the knee and wrist without tenderness and he has full range of motion. Right lower lateral chest wall tenderness over 8-10 ribs.   PSYCH: mentation appears normal, affect normal/bright    No results found for this or any previous visit (from  the past 24 hour(s)).

## 2021-06-24 ENCOUNTER — TELEPHONE (OUTPATIENT)
Dept: NEUROLOGY | Facility: CLINIC | Age: 36
End: 2021-06-24

## 2021-06-24 ENCOUNTER — VIRTUAL VISIT (OUTPATIENT)
Dept: NEUROLOGY | Facility: CLINIC | Age: 36
End: 2021-06-24
Payer: COMMERCIAL

## 2021-06-24 DIAGNOSIS — G43.709 CHRONIC MIGRAINE WITHOUT AURA WITHOUT STATUS MIGRAINOSUS, NOT INTRACTABLE: Primary | ICD-10-CM

## 2021-06-24 PROCEDURE — 99213 OFFICE O/P EST LOW 20 MIN: CPT | Mod: 95 | Performed by: PSYCHIATRY & NEUROLOGY

## 2021-06-24 NOTE — TELEPHONE ENCOUNTER
PA Initiation    Medication: galcanezumab-gnlm (EMGALITY) 120 MG/ML injection; Inject 2 mLs (240 mg) Subcutaneous once for 1 dose; Inject 1 mL (120 mg) Subcutaneous every 28 days - Subcutaneous  Insurance Company: [employer] - Phone [phone] Fax [phone]  Pharmacy Filling the Rx: [id number] - [place], [place] - [address] AT [facility] [address]  Filling Pharmacy Phone: [phone]  Filling Pharmacy Fax:    Start Date: 6/24/2021    Central Prior Authorization Team   Phone: [phone]

## 2021-06-24 NOTE — LETTER
6/24/2021       RE: Eliezer Ellis  [address]  [place] [address]     Dear Colleague,    Thank you for referring your patient, Eliezer Ellis, to the [facility] [place] [facility]. Please see a copy of my visit note below.    Eliezer is a 36 year old who is being evaluated via a billable video visit.      How would you like to obtain your AVS? MyChart  If the video visit is dropped, the invitation should be resent by: Send to e-mail at: [email or web address]  Will anyone else be joining your video visit? No      Video Start Time: 9:05 AM  Video-Visit Details    Type of service:  Video Visit    Video End Time:9:16 AM    Originating Location (pt. Location): Home    Distant Location (provider location):  [facility] [place]     Platform used for Video Visit: "[employer]    Chief Complaint   Patient presents with     RECHECK     VIDEO VISIT RETURN      Jose Mcelroy    [facility]    Clinics and Surgery Center  Neurology Progress Note    Subjective:    Mr. Ellis returns for follow-up of chronic migraine without aura.  At his last visit earlier this year, I recommended a trial of topiramate.  He contacted me after taking it for several months to report difficulty sleeping as a side effect.    Today, he reports that he stopped topiramate due to side effects with difficulty sleeping (1-2 hours per night less sleep). This improved with discontinuing.     He has had 3 severe migraine attacks in the past two months. He continues to have 15 headache days per month which are also bothersome.    He hasn't tried prochlorperazine yet. He tried naratriptan a few times, which helps somewhat. He takes ibuprofen or Tylenol first, and then uses naratriptan as a last resort.     Objective:    Vitals: There were no vitals taken for this visit.  General: Cooperative, NAD  Neurologic:  Mental Status: Fully  alert, attentive and oriented. Speech clear and fluent.   Cranial Nerves: Facial movements symmetric.   Motor: No abnormal movements.      Assessment/Plan:   Eliezer Ellis is a 36-year-old man who returns for follow-up of chronic migraine without aura.  He has previously taken amitriptyline, topiramate, and propranolol, which were not effective and caused intolerable side effects.     For acute treatment of headache, I recommend the following:  -For acute min of mild headache, he will continue ibuprofen as needed, not to exceed more than 14 days/month to avoid medication overuse.  -For acute treatment of moderate to severe headache, he will continue naratriptan 2.5 mg at the onset of headache, with a repeat dose in 4 hours if needed.  Should not exceed more than 9 days/month to avoid medication overuse.  -For nausea related to headache, or as a rescue treatment for migraine, I recommend prochlorperazine 10 mg as needed, not to exceed more than 9 days/month to avoid medication side effects.     His headache frequency and severity warrants prevention.   -I recommend a trial of Emgality subcutaneous injection every 28 days, starting with a loading dose.  I recommend 3 to 6 months prior to determining effectiveness.  Total side effects were discussed today.  -If Emgality is not tolerated or not effective after adequate trial, alternative options could include botulinum toxin injections using a chronic migraine protocol every 12 weeks, gabapentin, or verapamil.    I will plan to see him back in 3 to 6 months.  He will contact me via [employer] in the meantime with any concerns.    Sol Linares MD  Neurology           Again, thank you for allowing me to participate in the care of your patient.      Sincerely,    Sol Linares MD

## 2021-06-24 NOTE — TELEPHONE ENCOUNTER
Prior Authorization Retail Medication Request    Medication/Dose: galcanezumab-gnlm (EMGALITY) 120 MG/ML injection; Inject 2 mLs (240 mg) Subcutaneous once for 1 dose; Inject 1 mL (120 mg) Subcutaneous every 28 days - Subcutaneous  ICD code (if different than what is on RX):    G43.709  Previously Tried and Failed:  Topiramate, naratriptan, ibuprofen, tylenol, amitriptyline, propranolol  Rationale:  Chronic migraine    Insurance Name:  [employer]  Insurance ID:  [id number]       Pharmacy Information (if different than what is on RX)  Name:    Phone:

## 2021-06-24 NOTE — PROGRESS NOTES
Eliezer is a 36 year old who is being evaluated via a billable video visit.      How would you like to obtain your AVS? MyChart  If the video visit is dropped, the invitation should be resent by: Send to e-mail at: [email or web address]  Will anyone else be joining your video visit? No      Video Start Time: 9:05 AM  Video-Visit Details    Type of service:  Video Visit    Video End Time:9:16 AM    Originating Location (pt. Location): Home    Distant Location (provider location):  [facility] NEUROLOGY [facility] [place]     Platform used for Video Visit: [facility]    Chief Complaint   Patient presents with     RECHECK     VIDEO VISIT RETURN      Jose Mcelroy    [facility] and [facility] Center  Neurology Progress Note    Subjective:    Mr. Ellis returns for follow-up of chronic migraine without aura.  At his last visit earlier this year, I recommended a trial of topiramate.  He contacted me after taking it for several months to report difficulty sleeping as a side effect.    Today, he reports that he stopped topiramate due to side effects with difficulty sleeping (1-2 hours per night less sleep). This improved with discontinuing.     He has had 3 severe migraine attacks in the past two months. He continues to have 15 headache days per month which are also bothersome.    He hasn't tried prochlorperazine yet. He tried naratriptan a few times, which helps somewhat. He takes ibuprofen or Tylenol first, and then uses naratriptan as a last resort.     Objective:    Vitals: There were no vitals taken for this visit.  General: Cooperative, NAD  Neurologic:  Mental Status: Fully alert, attentive and oriented. Speech clear and fluent.   Cranial Nerves: Facial movements symmetric.   Motor: No abnormal movements.      Assessment/Plan:   Eliezer Ellis is a 36-year-old man who returns for follow-up of chronic migraine without aura.  He has previously taken amitriptyline, topiramate, and  propranolol, which were not effective and caused intolerable side effects.     For acute treatment of headache, I recommend the following:  -For acute min of mild headache, he will continue ibuprofen as needed, not to exceed more than 14 days/month to avoid medication overuse.  -For acute treatment of moderate to severe headache, he will continue naratriptan 2.5 mg at the onset of headache, with a repeat dose in 4 hours if needed.  Should not exceed more than 9 days/month to avoid medication overuse.  -For nausea related to headache, or as a rescue treatment for migraine, I recommend prochlorperazine 10 mg as needed, not to exceed more than 9 days/month to avoid medication side effects.     His headache frequency and severity warrants prevention.   -I recommend a trial of Emgality subcutaneous injection every 28 days, starting with a loading dose.  I recommend 3 to 6 months prior to determining effectiveness.  Total side effects were discussed today.  -If Emgality is not tolerated or not effective after adequate trial, alternative options could include botulinum toxin injections using a chronic migraine protocol every 12 weeks, gabapentin, or verapamil.    I will plan to see him back in 3 to 6 months.  He will contact me via [employer] in the meantime with any concerns.    Sol Linares MD  Neurology

## 2021-06-25 NOTE — TELEPHONE ENCOUNTER
Prior Authorization Approval    Authorization Effective Date: 3/25/2021  Authorization Expiration Date: 12/25/2021  Medication: galcanezumab-gnlm (EMGALITY) 120 MG/ML injection; Inject 2 mLs (240 mg) Subcutaneous once for 1 dose; Inject 1 mL (120 mg) Subcutaneous every 28 days - Subcutaneous  Approved Dose/Quantity: 120mg/ml  Reference #:     Insurance Company: [employer] - Phone [phone] Fax [phone]  Which Pharmacy is filling the prescription (Not needed for infusion/clinic administered): [id number] - [place], [place] - [facility] [address] AT [facility] [address]  Pharmacy Notified: Yes **Instructed pharmacy to notify patient when script is ready to /ship.**  Patient Notified: Yes

## 2021-06-30 NOTE — RESULT ENCOUNTER NOTE
I have left a VM  LDL is too high- need either a plan for life styel changes and recheck in 3 months or start atorvastatin 20 mg bedtime- I have not sent prescription  TSH is still hgh and increase of levothyroxine should be considered- will need at least TE appointment to discuss above

## 2021-07-16 NOTE — PROGRESS NOTES
Eliezer is a 36 year old who is being evaluated via a billable telephone visit.      What phone number would you like to be contacted at? [phone]  How would you like to obtain your AVS? MyChart    Assessment & Plan     High serum thyroid stimulating hormone (TSH)  Advised to make a lab only appointment to get it rechecked and for now continue with levothyroxine at 25 mc once daily - unsure if fatigue any different  - TSH with free T4 reflex; Future    Other fatigue  - TSH with free T4 reflex; Future    Muscle strain of chest wall, sequela  Fall 6/11/21  Intermittent pain- unsure what brings it on.  Advised office visit if any worsening    Anxiety & depression   RICHARD-7 SCORE 11/9/2020 5/3/2021 7/19/2021   Total Score - - -   Total Score 5 5 5     PHQ 11/9/2020 5/3/2021 7/19/2021   PHQ-9 Total Score 2 2 3   Q9: Thoughts of better off dead/self-harm past 2 weeks Not at all Not at all Not at all       Still a struggle with anxiety  Does do  weekly therapy    I have advised to increase the dose from 50 to 100 mg and keep us informed if intolerance to medications or mood changes  Continue with weekly therapy      Hyperlipidemia  He does eat red meat  Willing to cut back and get that rechecked  Not willing to start any medications  Has been taking omega 3 for mental health as well      45 minutes spent on the date of the encounter doing chart review, history and exam, documentation and further activities per the note    Return in about 4 weeks (around 8/16/2021) for concerns,unresolved.    Ursula Huang MD  [facility]   Eliezer is a 36 year old who presents for the following health issues     HPI     Fatigue  ,maybe about the same  Has been on levothyroxine 25 mcg about 10 weeks  unsure if fatigue any different    Had long road trip to [facility]- end of June  Did not over due - packing , lifting though did most of the driving    Rib pains - on right- still catches out of  blue  Has not needed pain medications .    Patient states he is going over lab results with provider.    Since last visit, patient describes the following symptoms: Weight stable, no hair loss, no skin changes, no constipation, no loose stools        How many servings of fruits and vegetables do you eat daily?  Servings 4     On average, how many sweetened beverages do you drink each day (Examples: soda, juice, sweet tea, etc.  Do NOT count diet or artificially sweetened beverages)?   0    How many days per week do you exercise enough to make your heart beat faster? 3 or less    How many minutes a day do you exercise enough to make your heart beat faster? 20 - 29    How many days per week do you miss taking your medication? 0    Mood depression and anxiety  still more anxious than depressed  Has been on pristique 50mg and wondering about the dose,  Initial more impact of pristique and now not some much jakob on anxiety      Review of Systems   Constitutional, HEENT, cardiovascular, pulmonary, GI, , musculoskeletal, neuro, skin, endocrine and psych systems are negative, except as otherwise noted.      Objective           Vitals:  No vitals were obtained today due to virtual visit.    Physical Exam   healthy, alert and no distress  PSYCH: Alert and oriented times 3; coherent speech, normal   rate and volume, able to articulate logical thoughts, able   to abstract reason, no tangential thoughts, no hallucinations   or delusions  His affect is normal  RESP: No cough, no audible wheezing, able to talk in full sentences  Remainder of exam unable to be completed due to telephone visits  PHQ 9/25/2019 11/9/2020 5/3/2021   PHQ-9 Total Score 5 2 2   Q9: Thoughts of better off dead/self-harm past 2 weeks Not at all Not at all Not at all     RICHARD-7 SCORE 9/25/2019 11/9/2020 5/3/2021   Total Score - - -   Total Score 7 5 5                   Phone call duration: 12  minutes

## 2021-07-19 ENCOUNTER — VIRTUAL VISIT (OUTPATIENT)
Dept: FAMILY MEDICINE | Facility: CLINIC | Age: 36
End: 2021-07-19
Payer: COMMERCIAL

## 2021-07-19 DIAGNOSIS — R53.83 OTHER FATIGUE: ICD-10-CM

## 2021-07-19 DIAGNOSIS — F41.9 ANXIETY: ICD-10-CM

## 2021-07-19 DIAGNOSIS — F33.1 MODERATE EPISODE OF RECURRENT MAJOR DEPRESSIVE DISORDER (H): ICD-10-CM

## 2021-07-19 DIAGNOSIS — S29.011S MUSCLE STRAIN OF CHEST WALL, SEQUELA: ICD-10-CM

## 2021-07-19 DIAGNOSIS — R79.89 HIGH SERUM THYROID STIMULATING HORMONE (TSH): Primary | ICD-10-CM

## 2021-07-19 DIAGNOSIS — E78.5 HYPERLIPIDEMIA LDL GOAL <160: ICD-10-CM

## 2021-07-19 PROCEDURE — 96127 BRIEF EMOTIONAL/BEHAV ASSMT: CPT | Performed by: FAMILY MEDICINE

## 2021-07-19 PROCEDURE — 99214 OFFICE O/P EST MOD 30 MIN: CPT | Mod: 95 | Performed by: FAMILY MEDICINE

## 2021-07-19 RX ORDER — DESVENLAFAXINE 50 MG/1
50 TABLET, FILM COATED, EXTENDED RELEASE ORAL DAILY
Qty: 30 TABLET | Refills: 11 | Status: SHIPPED | OUTPATIENT
Start: 2021-07-19 | End: 2021-07-19

## 2021-07-19 ASSESSMENT — ANXIETY QUESTIONNAIRES
7. FEELING AFRAID AS IF SOMETHING AWFUL MIGHT HAPPEN: NOT AT ALL
3. WORRYING TOO MUCH ABOUT DIFFERENT THINGS: SEVERAL DAYS
GAD7 TOTAL SCORE: 5
IF YOU CHECKED OFF ANY PROBLEMS ON THIS QUESTIONNAIRE, HOW DIFFICULT HAVE THESE PROBLEMS MADE IT FOR YOU TO DO YOUR WORK, TAKE CARE OF THINGS AT HOME, OR GET ALONG WITH OTHER PEOPLE: SOMEWHAT DIFFICULT
2. NOT BEING ABLE TO STOP OR CONTROL WORRYING: SEVERAL DAYS
6. BECOMING EASILY ANNOYED OR IRRITABLE: NOT AT ALL
1. FEELING NERVOUS, ANXIOUS, OR ON EDGE: MORE THAN HALF THE DAYS
5. BEING SO RESTLESS THAT IT IS HARD TO SIT STILL: NOT AT ALL

## 2021-07-19 ASSESSMENT — PATIENT HEALTH QUESTIONNAIRE - PHQ9
SUM OF ALL RESPONSES TO PHQ QUESTIONS 1-9: 3
5. POOR APPETITE OR OVEREATING: SEVERAL DAYS

## 2021-07-20 ASSESSMENT — ANXIETY QUESTIONNAIRES: GAD7 TOTAL SCORE: 5

## 2021-08-01 ENCOUNTER — MYC MEDICAL ADVICE (OUTPATIENT)
Dept: FAMILY MEDICINE | Facility: CLINIC | Age: 36
End: 2021-08-01

## 2021-08-01 DIAGNOSIS — F33.1 MODERATE EPISODE OF RECURRENT MAJOR DEPRESSIVE DISORDER (H): ICD-10-CM

## 2021-08-01 DIAGNOSIS — F41.9 ANXIETY: ICD-10-CM

## 2021-08-02 NOTE — TELEPHONE ENCOUNTER
A.S.    Please see [employer] messages below    Not on current medication list.  Anabel Hahn RN

## 2021-08-03 RX ORDER — DESVENLAFAXINE 100 MG/1
100 TABLET, EXTENDED RELEASE ORAL DAILY
Qty: 30 TABLET | Refills: 3 | Status: SHIPPED | OUTPATIENT
Start: 2021-08-03 | End: 2022-05-27

## 2021-08-03 NOTE — TELEPHONE ENCOUNTER
PHQ 11/9/2020 5/3/2021 7/19/2021   PHQ-9 Total Score 2 2 3   Q9: Thoughts of better off dead/self-harm past 2 weeks Not at all Not at all Not at all   yes- we had increased the dose to 2 tabs of 50 mg & with intent that if you tolerate it well- I will be sending 100 mg tab once daily and it is at your pharmacy.  I just wanted to make sure that you tolerate it well- hope that helps- it has at least 3 refill also

## 2021-10-02 ENCOUNTER — HEALTH MAINTENANCE LETTER (OUTPATIENT)
Age: 36
End: 2021-10-02

## 2021-11-12 ENCOUNTER — TRANSFERRED RECORDS (OUTPATIENT)
Dept: HEALTH INFORMATION MANAGEMENT | Facility: CLINIC | Age: 36
End: 2021-11-12
Payer: COMMERCIAL

## 2022-01-20 ENCOUNTER — TELEPHONE (OUTPATIENT)
Dept: NEUROLOGY | Facility: CLINIC | Age: 37
End: 2022-01-20

## 2022-01-23 NOTE — TELEPHONE ENCOUNTER
Central Prior Authorization Team   Phone: [phone]      PA Initiation    Medication: galcanezumab-gnlm (EMGALITY) 120 MG/ML injection  Insurance Company: [employer] - Phone [phone] Fax [phone]  Pharmacy Filling the Rx: [id number] - [place], [place] - [address] AT [facility] [address]  Filling Pharmacy Phone: [phone]  Filling Pharmacy Fax:    Start Date: 1/21/2022            
Prior Authorization Approval    Authorization Effective Date: 1/1/2022  Authorization Expiration Date: 1/22/2023  Medication: galcanezumab-gnlm (EMGALITY) 120 MG/ML injection-APPROVED  Approved Dose/Quantity:   Reference #:     Insurance Company: [employer] - Phone [phone] Fax [phone]  Expected CoPay:       CoPay Card Available:      Foundation Assistance Needed:    Which Pharmacy is filling the prescription (Not needed for infusion/clinic administered): [id number] - [place], [place] - [address] AT [facility] [address]  Pharmacy Notified: Yes-Pharmacy will notify patient when ready.  Patient Notified: No      
Prior Authorization Retail Medication Request     Medication/Dose: galcanezumab-gnlm (EMGALITY) 120 MG/ML injection; Inject 2 mLs (240 mg) Subcutaneous once for 1 dose; Inject 1 mL (120 mg) Subcutaneous every 28 days - Subcutaneous  ICD code (if different than what is on RX):    G43.709  Previously Tried and Failed:  Topiramate, naratriptan, ibuprofen, tylenol, amitriptyline, propranolol  Rationale:  Chronic migraine     Insurance Name:  [employer]  Insurance ID:  [id number]         Pharmacy Information (if different than what is on RX)  Name:    Phone:     
patient

## 2022-04-22 ENCOUNTER — MYC MEDICAL ADVICE (OUTPATIENT)
Dept: FAMILY MEDICINE | Facility: CLINIC | Age: 37
End: 2022-04-22
Payer: COMMERCIAL

## 2022-05-27 ENCOUNTER — VIRTUAL VISIT (OUTPATIENT)
Dept: FAMILY MEDICINE | Facility: CLINIC | Age: 37
End: 2022-05-27
Payer: COMMERCIAL

## 2022-05-27 DIAGNOSIS — M1A.4720 CHRONIC GOUT DUE TO OTHER SECONDARY CAUSE INVOLVING TOE OF LEFT FOOT WITHOUT TOPHUS: ICD-10-CM

## 2022-05-27 DIAGNOSIS — F41.9 ANXIETY: ICD-10-CM

## 2022-05-27 DIAGNOSIS — U07.1 INFECTION DUE TO 2019 NOVEL CORONAVIRUS: Primary | ICD-10-CM

## 2022-05-27 DIAGNOSIS — F33.1 MODERATE EPISODE OF RECURRENT MAJOR DEPRESSIVE DISORDER (H): ICD-10-CM

## 2022-05-27 PROCEDURE — 99213 OFFICE O/P EST LOW 20 MIN: CPT | Mod: 95 | Performed by: PHYSICIAN ASSISTANT

## 2022-05-27 RX ORDER — DESVENLAFAXINE 50 MG/1
50 TABLET, FILM COATED, EXTENDED RELEASE ORAL DAILY
COMMUNITY
Start: 2022-05-27 | End: 2022-05-27

## 2022-05-27 RX ORDER — ALLOPURINOL 100 MG/1
TABLET ORAL
Qty: 90 TABLET | Refills: 0 | Status: SHIPPED | OUTPATIENT
Start: 2022-05-27 | End: 2022-06-13

## 2022-05-27 RX ORDER — DESVENLAFAXINE 50 MG/1
TABLET, FILM COATED, EXTENDED RELEASE ORAL
Qty: 30 TABLET | Refills: 0 | Status: SHIPPED | OUTPATIENT
Start: 2022-05-27 | End: 2022-06-13

## 2022-05-27 NOTE — PATIENT INSTRUCTIONS

## 2022-05-27 NOTE — PROGRESS NOTES
Eliezer is a 37 year old who is being evaluated via a billable video visit.      How would you like to obtain your AVS? MyChart  If the video visit is dropped, the invitation should be resent by: Text to cell phone: [phone]  Will anyone else be joining your video visit? No    Video Start Time: 9:34    Assessment & Plan     Infection due to 2019 novel coronavirus  History of this and within 5 days of symptoms. Given bmi and mental health history, qualify for tx. Discussed medications. If xanax needed, cut in half. Avoid flonase otc. Call pharmacy. Isolation discussed. See patient instructions. If severe shortness of breath or chest pains develop, to to ER.   - nirmatrelvir and ritonavir (PAXLOVID) therapy pack; Take 3 tablets by mouth 2 times daily for 5 days Take 2 Nirmatrelvir tablets and 1 Ritonavir tablet twice daily for 5 days.  -Medication use and side effects discussed with the patient. Patient is in complete understanding and agreement with plan.       Anxiety  As above. Dose updated in chart of pristiq.   - desvenlafaxine (PRISTIQ) 50 MG 24 hr tablet; Take 1 tablet (50 mg) by mouth daily    Moderate episode of recurrent major depressive disorder (H)    - desvenlafaxine (PRISTIQ) 50 MG 24 hr tablet; Take 1 tablet (50 mg) by mouth daily      Return in about 3 months (around 8/27/2022) for Physical Exam, with pcp.    TORY Hess [facility]   Eliezer is a 37 year old who presents for the following health issues   HPI       COVID-19 Symptom Review  How many days ago did these symptoms start? 2 days ago pt took first at home test was negative, took another on Thursday was positive    Are any of the following symptoms significant for you?    New or worsening difficulty breathing? No    Worsening cough? Yes, it's a dry cough.     Fever or chills? Yes, the highest temperature was 101.8    Headache: YES    Sore throat: no    Chest pain: no    Diarrhea:  no    Body aches? YES    What treatments has patient tried? Nyquil, dayquil   Does patient live in a nursing home, group home, or shelter? no  Does patient have a way to get food/medications during quarantined? Yes, I have a friend or family member who can help me.            Review of Systems   Constitutional, HEENT, cardiovascular, pulmonary, GI, , musculoskeletal, neuro, skin, endocrine and psych systems are negative, except as otherwise noted.      Objective    Vitals - Patient Reported  Weight (Patient Reported): 113.4 kg (250 lb)  Height (Patient Reported): 182.9 cm (6')  BMI (Based on Pt Reported Ht/Wt): 33.91  Temperature (Patient Reported): 99.8  F (37.7  C)      Vitals:  No vitals were obtained today due to virtual visit.    Physical Exam   GENERAL: healthy, alert, no distress and fatigued  EYES: Eyes grossly normal to inspection.  No discharge or erythema, or obvious scleral/conjunctival abnormalities.  RESP: No audible wheeze, cough, or visible cyanosis.  No visible retractions or increased work of breathing.    SKIN: Visible skin clear. No significant rash, abnormal pigmentation or lesions.  NEURO: Cranial nerves grossly intact.  Mentation and speech appropriate for age.  PSYCH: Mentation appears normal, affect normal/bright, judgement and insight intact, normal speech and appearance well-groomed.          Video-Visit Details    Type of service:  Video Visit    Video End Time:9:57 AM    Originating Location (pt. Location): Home    Distant Location (provider location):  [facility] [place]     Platform used for Video Visit: [employer]

## 2022-05-27 NOTE — TELEPHONE ENCOUNTER
Pristiq:  Routing refill request to provider for review/approval because:  Medication is reported/historical    Allopurinol:  Prescription approved per [facility] Refill Protocol.    Shira BRYSON RN

## 2022-06-10 ASSESSMENT — ENCOUNTER SYMPTOMS
ABDOMINAL PAIN: 0
EYE PAIN: 0
DIZZINESS: 0
JOINT SWELLING: 0
ARTHRALGIAS: 0
HEARTBURN: 0
HEMATURIA: 0
PARESTHESIAS: 0
HEADACHES: 1
FREQUENCY: 0
SHORTNESS OF BREATH: 0
DIARRHEA: 0
CHILLS: 0
HEMATOCHEZIA: 0
COUGH: 1
SORE THROAT: 0
NERVOUS/ANXIOUS: 1
NAUSEA: 1
MYALGIAS: 0
WEAKNESS: 0
CONSTIPATION: 0
FEVER: 0
PALPITATIONS: 0
DYSURIA: 0

## 2022-06-10 ASSESSMENT — ANXIETY QUESTIONNAIRES
3. WORRYING TOO MUCH ABOUT DIFFERENT THINGS: SEVERAL DAYS
5. BEING SO RESTLESS THAT IT IS HARD TO SIT STILL: NOT AT ALL
1. FEELING NERVOUS, ANXIOUS, OR ON EDGE: SEVERAL DAYS
6. BECOMING EASILY ANNOYED OR IRRITABLE: NOT AT ALL
GAD7 TOTAL SCORE: 3
7. FEELING AFRAID AS IF SOMETHING AWFUL MIGHT HAPPEN: NOT AT ALL
2. NOT BEING ABLE TO STOP OR CONTROL WORRYING: NOT AT ALL
IF YOU CHECKED OFF ANY PROBLEMS ON THIS QUESTIONNAIRE, HOW DIFFICULT HAVE THESE PROBLEMS MADE IT FOR YOU TO DO YOUR WORK, TAKE CARE OF THINGS AT HOME, OR GET ALONG WITH OTHER PEOPLE: NOT DIFFICULT AT ALL

## 2022-06-10 ASSESSMENT — PATIENT HEALTH QUESTIONNAIRE - PHQ9
5. POOR APPETITE OR OVEREATING: SEVERAL DAYS
SUM OF ALL RESPONSES TO PHQ QUESTIONS 1-9: 3
SUM OF ALL RESPONSES TO PHQ QUESTIONS 1-9: 3
10. IF YOU CHECKED OFF ANY PROBLEMS, HOW DIFFICULT HAVE THESE PROBLEMS MADE IT FOR YOU TO DO YOUR WORK, TAKE CARE OF THINGS AT HOME, OR GET ALONG WITH OTHER PEOPLE: NOT DIFFICULT AT ALL

## 2022-06-11 ASSESSMENT — ENCOUNTER SYMPTOMS
NERVOUS/ANXIOUS: 1
NAUSEA: 1
WEAKNESS: 0
DIZZINESS: 0
EYE PAIN: 0
COUGH: 1
SHORTNESS OF BREATH: 0
DIARRHEA: 0
PALPITATIONS: 0
CONSTIPATION: 0
CHILLS: 0
JOINT SWELLING: 0
PARESTHESIAS: 0
HEARTBURN: 0
MYALGIAS: 0
SORE THROAT: 0
ABDOMINAL PAIN: 0
HEMATOCHEZIA: 0
HEMATURIA: 0
DYSURIA: 0
HEADACHES: 1
ARTHRALGIAS: 0
FREQUENCY: 0
FEVER: 0

## 2022-06-11 NOTE — PROGRESS NOTES
SUBJECTIVE:   CC: Eliezer Ellis is an 37 year old male who presents for preventative health visit.       Patient has been advised of split billing requirements and indicates understanding: Yes  Healthy Habits:     Getting at least 3 servings of Calcium per day:  Yes    Bi-annual eye exam:  NO    Dental care twice a year:  NO    Sleep apnea or symptoms of sleep apnea:  Excessive snoring    Diet:  Other    Frequency of exercise:  1 day/week    Duration of exercise:  15-30 minutes    Taking medications regularly:  Yes    Medication side effects:  Not applicable    PHQ-2 Total Score: 0    Additional concerns today:  Yes  cough, congestion  Nausea headache   Anxiety is managed more with weekly therapy.  Headache is a big piece of well being. A lot of day- headache escalates to migraine - chronic daily headache is always present.  Nausea is more prominent past yr- liked with hunger- feels so hungry that will almost through up- nausea related to missing meals, and worse when hungry.      Answers for HPI/ROS submitted by the patient on 6/10/2022  If you checked off any problems, how difficult have these problems made it for you to do your work, take care of things at home, or get along with other people?: Not difficult at all  PHQ9 TOTAL SCORE: 3           Today's PHQ-2 Score:   PHQ-2 ( 1999 Pfizer) 6/10/2022   Q1: Little interest or pleasure in doing things 0   Q2: Feeling down, depressed or hopeless 0   PHQ-2 Score 0   PHQ-2 Total Score (12-17 Years)- Positive if 3 or more points; Administer PHQ-A if positive -   Q1: Little interest or pleasure in doing things Not at all   Q2: Feeling down, depressed or hopeless Not at all   PHQ-2 Score 0       Abuse: Current or Past(Physical, Sexual or Emotional)- No  Do you feel safe in your environment? Yes        Social History     Tobacco Use     Smoking status: Never Smoker     Smokeless tobacco: Never Used   Substance Use Topics     Alcohol use: Not Currently     Alcohol/week: 0.0  standard drinks     Comment: 2-3x week     If you drink alcohol do you typically have >3 drinks per day or >7 drinks per week? No    No flowsheet data found.    Last PSA: No results found for: PSA    Reviewed orders with patient. Reviewed health maintenance and updated orders accordingly - Yes  BP Readings from Last 3 Encounters:   06/13/22 117/88   06/14/21 120/86   05/03/21 126/88    Wt Readings from Last 3 Encounters:   06/13/22 121.1 kg (267 lb)   06/14/21 113.4 kg (250 lb)   05/03/21 112.7 kg (248 lb 6.4 oz)                    Reviewed and updated as needed this visit by clinical staff   Tobacco  Allergies  Meds  Problems  Med Hx  Surg Hx  Fam Hx  Soc   Hx          Reviewed and updated as needed this visit by Provider    Review of Systems   Constitutional: Negative for chills and fever.   HENT: Positive for congestion. Negative for ear pain, hearing loss and sore throat.    Eyes: Negative for pain and visual disturbance.   Respiratory: Positive for cough. Negative for shortness of breath.    Cardiovascular: Negative for chest pain, palpitations and peripheral edema.   Gastrointestinal: Positive for nausea. Negative for abdominal pain, constipation, diarrhea, heartburn and hematochezia.   Genitourinary: Negative for dysuria, frequency, genital sores, hematuria, impotence, penile discharge and urgency.   Musculoskeletal: Negative for arthralgias, joint swelling and myalgias.   Skin: Negative for rash.   Neurological: Positive for headaches. Negative for dizziness, weakness and paresthesias.   Psychiatric/Behavioral: Negative for mood changes. The patient is nervous/anxious.    covid- 5/27/22- completed 5days of paxlovid.  currently on desvenalafaxine and unsure if it really has helped or just added to nausea.  He experiences daily nausea and it is often triggered by hunger.  He describes his nausea as hunger pangs that leads to overeating junk food including peanut butter coated pretzel that has led to  "weight gain.  He denies any binge eating he denies purging.  Known history of chronic daily headache for which he takes ibuprofen 4 to 600 mg which does couple times a week twice daily but it at least 4 doses a week.  He avoids taking it empty stomach.  He denies any vomiting hematemesis bleeding from any orifice.  No history of migraine headaches, under care of neurology, he thinks emgality for migraine headache probably has helped with frequency of migraine headache does not need max and hardly uses it .  No drugs or  alcohol abuse.        OBJECTIVE:   /88   Pulse 81   Temp 98.6  F (37  C) (Temporal)   Resp 16   Ht 1.816 m (5' 11.5\")   Wt 121.1 kg (267 lb)   SpO2 98%   BMI 36.72 kg/m      Physical Exam  GENERAL: healthy, alert and no distress  EYES: Eyes grossly normal to inspection, PERRL and conjunctivae and sclerae normal  HENT: ear canals and TM's normal, nose and mouth without ulcers or lesions  NECK: no adenopathy, no asymmetry, masses, or scars and thyroid normal to palpation  RESP: lungs clear to auscultation - no rales, rhonchi or wheezes  CV: regular rate and rhythm, normal S1 S2, no S3 or S4, no murmur, click or rub, no peripheral edema and peripheral pulses strong  ABDOMEN: soft, nontender, no hepatosplenomegaly, no masses and bowel sounds normal  MS: no gross musculoskeletal defects noted, no edema  SKIN: no suspicious lesions or rashes  NEURO: Normal strength and tone, mentation intact and speech normal  PSYCH: mentation appears normal, affect normal/bright    Diagnostic Test Results:  Labs reviewed in Epic  No results found for any visits on 06/13/22.    ASSESSMENT/PLAN:     Eliezer was seen today for physical.    Diagnoses and all orders for this visit:  (Z00.00) Routine general medical examination at a health care facility       (F41.9) Anxiety (F32.9) Reactive depression  Comment: Huddle lab test completed.  He has seen MTM in past.  Intolerance to multiple SSRI SNRI including " Lexapro Prozac Cymbalta Viibryd Effexor.  He also had intolerance to bupropion and BuSpar.  Most recently he has been on Pristiq and continues to have nausea that I think could be from the  Pristiq.  We will try and taper it off as half tablet daily though it is extended release to every other day or a period of 1 to 2 months.  Also discussed E consult with behavioral health, he has never been seen by psychiatrist as well.  He denies any bipolar or impulsive mood change, its always been anxiety and that he has slight reactive depression.  Plan: desvenlafaxine (PRISTIQ) 50 MG 24 hr tablet,         Adult E-Consult to Behavioral Health (Outpt         Provider to Specialist Written Question &         Response), EMOTIONAL / BEHAVIORAL ASSESSMENT,         OFFICE/OUTPT VISIT,EST,LEVL III    Follow up in 4 weeks ,earlierif concerns    (K21.00) Gastroesophageal reflux disease with esophagitis, unspecified whether hemorrhage  -     Helicobacter pylori Antigen Stool; Future  -     Adult Gastro Ref - Consult Only; Future  Plan: Helicobacter pylori Antigen Stool,   Adult Gastro  Ref - Consult Only, consider endoscopy and discussed other differential diagnosis for ongoing nausea weight gain .  omeprazole (PRILOSEC) 20 MG  DR capsule, once daily  ,LEVL III, US Abdomen Complete for gallbladder liver pancreas pathology.       (R63.5) Weight gain, nausea and possible reflux  Comment: Ongoing weight gain due to overeating, secondary to nausea possibly from reflux versus peptic ulcer disease versus medication site effect  Plan: I do recommend to watch diet carefully avoid fatty snacks avoid late night snacks avoid laying or reclining soon after fatty meals.  Start proton pump inhibitor omeprazole 20 mg empty stomach and then eat in half an hour.  He can also take omeprazole 20 mg twice daily if it helps.  I also recommend to proceed with H. pylori stool test to rule out H. pylori gastritis.  I will check complete metabolic panel and  ultrasound as well.      Due to use of NSAID, suspicion for peptic ulcer disease is also in differential diagnosis.  I do recommend to proceed with gastroenterologist consultation.  I also have advised him to avoid NSAID empty stomach the patient says he is compliant with.    Labs today.  Basic metabolic panel  (Ca, Cl, CO2, Creat,  Gluc, K, Na, BUN), Hemoglobin A1c, TSH with free T4 reflex,   dult E-Consult to Behavioral       Health (Outpt Provider to Specialist Written Question & Response),  Hepatic panel (Albumin, ALT, ST, Bili, Alk Phos, TP),     Meds prescribed :omeprazole (PRILOSEC) 20 orally daily,OFFICE/OUTPT       VISIT,EST,LEVL III    Potential medication side effects were discussed with the patient; let me know if any occur.      (R11.0) Nausea  Comment: as above   Plan: Helicobacter pylori Antigen Stool, Adult Gastro        Ref - Consult Only, Basic metabolic panel  (Ca,        Cl, CO2, Creat, Gluc, K, Na, BUN), Adult         E-Consult to Behavioral Health (Outpt Provider         to Specialist Written Question & Response),         omeprazole (PRILOSEC) 20 MG DR capsule, Hepatic        panel (Albumin, ALT, AST, Bili, Alk Phos, TP),         OFFICE/OUTPT VISIT,EST,LEVL III, US Abdomen         Complete       (R51.9) Chronic daily headache  Plan: - considered this problem when making today's plans  - no interventions today       -followed by specialist.        (G43.709) Chronic migraine without aura without status migrainosus, not intractable  Plan: - considered this problem when making today's plans  - no interventions today       -followed by specialist.      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  Plan: fluocinonide (LIDEX) 0.05 % external gel,         OFFICE/OUTPT VISIT,EST,LEVL III          (Z11.4) Screening for HIV (human immunodeficiency virus)  Comment: Plan: HIV Antigen Antibody Combo          (Z11.59) Need for hepatitis C screening test  Comment: Plan: Hepatitis C Screen Reflex to HCV RNA Quant and         Genotype            (E66.01) Morbid obesity (H)  Comment: Multifactorial as listed above.  Plan: TSH with free T4 reflex        (Z86.39) History of hypothyroidism  Comment: Plan: TSH with free T4 reflex      (Z13.220) Screening for lipid disorders  Comment: Fasting for labs.  Plan: Lipid panel reflex to direct LDL Fasting      Other orders  -     REVIEW OF HEALTH MAINTENANCE PROTOCOL ORDERS  -     TDAP VACCINE (Adacel, Boostrix)        Patient has been advised of split billing requirements and indicates understanding: Yes    COUNSELING:   Reviewed preventive health counseling, as reflected in patient instructions       Regular exercise       Healthy diet/nutrition       Vision screening       Hearing screening    Estimated body mass index is 36.72 kg/m  as calculated from the following:    Height as of this encounter: 1.816 m (5' 11.5\").    Weight as of this encounter: 121.1 kg (267 lb).     Weight management plan: Discussed healthy diet and exercise guidelines    He reports that he has never smoked. He has never used smokeless tobacco.      Counseling Resources:  ATP IV Guidelines  Pooled Cohorts Equation Calculator  FRAX Risk Assessment  ICSI Preventive Guidelines  Dietary Guidelines for Americans, 2010  USDA's MyPlate  ASA Prophylaxis  Lung CA Screening    Ursula Huang MD  [facility]  "

## 2022-06-13 ENCOUNTER — OFFICE VISIT (OUTPATIENT)
Dept: FAMILY MEDICINE | Facility: CLINIC | Age: 37
End: 2022-06-13
Payer: COMMERCIAL

## 2022-06-13 ENCOUNTER — E-CONSULT (OUTPATIENT)
Dept: BEHAVIORAL HEALTH | Facility: CLINIC | Age: 37
End: 2022-06-13
Payer: COMMERCIAL

## 2022-06-13 ENCOUNTER — MYC MEDICAL ADVICE (OUTPATIENT)
Dept: FAMILY MEDICINE | Facility: CLINIC | Age: 37
End: 2022-06-13

## 2022-06-13 VITALS
SYSTOLIC BLOOD PRESSURE: 117 MMHG | RESPIRATION RATE: 16 BRPM | HEIGHT: 72 IN | BODY MASS INDEX: 36.16 KG/M2 | DIASTOLIC BLOOD PRESSURE: 88 MMHG | HEART RATE: 81 BPM | WEIGHT: 267 LBS | OXYGEN SATURATION: 98 % | TEMPERATURE: 98.6 F

## 2022-06-13 DIAGNOSIS — K21.00 GASTROESOPHAGEAL REFLUX DISEASE WITH ESOPHAGITIS, UNSPECIFIED WHETHER HEMORRHAGE: ICD-10-CM

## 2022-06-13 DIAGNOSIS — Z11.59 NEED FOR HEPATITIS C SCREENING TEST: ICD-10-CM

## 2022-06-13 DIAGNOSIS — E66.01 MORBID OBESITY (H): ICD-10-CM

## 2022-06-13 DIAGNOSIS — R11.0 NAUSEA: ICD-10-CM

## 2022-06-13 DIAGNOSIS — Z87.19 HISTORY OF CANKER SORES: ICD-10-CM

## 2022-06-13 DIAGNOSIS — M1A.4720 CHRONIC GOUT DUE TO OTHER SECONDARY CAUSE INVOLVING TOE OF LEFT FOOT WITHOUT TOPHUS: ICD-10-CM

## 2022-06-13 DIAGNOSIS — Z11.4 SCREENING FOR HIV (HUMAN IMMUNODEFICIENCY VIRUS): ICD-10-CM

## 2022-06-13 DIAGNOSIS — F32.9 REACTIVE DEPRESSION: ICD-10-CM

## 2022-06-13 DIAGNOSIS — Z86.39 HISTORY OF HYPOTHYROIDISM: ICD-10-CM

## 2022-06-13 DIAGNOSIS — G43.709 CHRONIC MIGRAINE WITHOUT AURA WITHOUT STATUS MIGRAINOSUS, NOT INTRACTABLE: ICD-10-CM

## 2022-06-13 DIAGNOSIS — Z13.220 SCREENING FOR LIPID DISORDERS: ICD-10-CM

## 2022-06-13 DIAGNOSIS — R79.89 ABNORMAL TSH: Primary | ICD-10-CM

## 2022-06-13 DIAGNOSIS — R63.5 WEIGHT GAIN: ICD-10-CM

## 2022-06-13 DIAGNOSIS — F41.9 ANXIETY: ICD-10-CM

## 2022-06-13 DIAGNOSIS — Z00.00 ROUTINE GENERAL MEDICAL EXAMINATION AT A HEALTH CARE FACILITY: Primary | ICD-10-CM

## 2022-06-13 DIAGNOSIS — R51.9 CHRONIC DAILY HEADACHE: ICD-10-CM

## 2022-06-13 LAB
ALBUMIN SERPL-MCNC: 4.1 G/DL (ref 3.4–5)
ALP SERPL-CCNC: 89 U/L (ref 40–150)
ALT SERPL W P-5'-P-CCNC: 42 U/L (ref 0–70)
ANION GAP SERPL CALCULATED.3IONS-SCNC: 7 MMOL/L (ref 3–14)
AST SERPL W P-5'-P-CCNC: 21 U/L (ref 0–45)
BILIRUB DIRECT SERPL-MCNC: 0.1 MG/DL (ref 0–0.2)
BILIRUB SERPL-MCNC: 0.5 MG/DL (ref 0.2–1.3)
BUN SERPL-MCNC: 15 MG/DL (ref 7–30)
CALCIUM SERPL-MCNC: 9.2 MG/DL (ref 8.5–10.1)
CHLORIDE BLD-SCNC: 107 MMOL/L (ref 94–109)
CHOLEST SERPL-MCNC: 263 MG/DL
CO2 SERPL-SCNC: 26 MMOL/L (ref 20–32)
CREAT SERPL-MCNC: 0.83 MG/DL (ref 0.66–1.25)
FASTING STATUS PATIENT QL REPORTED: YES
GFR SERPL CREATININE-BSD FRML MDRD: >90 ML/MIN/1.73M2
GLUCOSE BLD-MCNC: 93 MG/DL (ref 70–99)
HBA1C MFR BLD: 4.8 % (ref 0–5.6)
HCV AB SERPL QL IA: NONREACTIVE
HDLC SERPL-MCNC: 33 MG/DL
HIV 1+2 AB+HIV1 P24 AG SERPL QL IA: NONREACTIVE
LDLC SERPL CALC-MCNC: 178 MG/DL
NONHDLC SERPL-MCNC: 230 MG/DL
POTASSIUM BLD-SCNC: 4.3 MMOL/L (ref 3.4–5.3)
PROT SERPL-MCNC: 7.6 G/DL (ref 6.8–8.8)
SODIUM SERPL-SCNC: 140 MMOL/L (ref 133–144)
T4 FREE SERPL-MCNC: 0.76 NG/DL (ref 0.76–1.46)
TRIGL SERPL-MCNC: 260 MG/DL
TSH SERPL DL<=0.005 MIU/L-ACNC: 6.47 MU/L (ref 0.4–4)
URATE SERPL-MCNC: 5.8 MG/DL (ref 3.5–7.2)

## 2022-06-13 PROCEDURE — 80061 LIPID PANEL: CPT | Performed by: FAMILY MEDICINE

## 2022-06-13 PROCEDURE — 84439 ASSAY OF FREE THYROXINE: CPT | Performed by: FAMILY MEDICINE

## 2022-06-13 PROCEDURE — 99395 PREV VISIT EST AGE 18-39: CPT | Mod: 25 | Performed by: FAMILY MEDICINE

## 2022-06-13 PROCEDURE — 80053 COMPREHEN METABOLIC PANEL: CPT | Performed by: FAMILY MEDICINE

## 2022-06-13 PROCEDURE — 86803 HEPATITIS C AB TEST: CPT | Performed by: FAMILY MEDICINE

## 2022-06-13 PROCEDURE — 82248 BILIRUBIN DIRECT: CPT | Performed by: FAMILY MEDICINE

## 2022-06-13 PROCEDURE — 90471 IMMUNIZATION ADMIN: CPT | Performed by: FAMILY MEDICINE

## 2022-06-13 PROCEDURE — 90715 TDAP VACCINE 7 YRS/> IM: CPT | Performed by: FAMILY MEDICINE

## 2022-06-13 PROCEDURE — 84550 ASSAY OF BLOOD/URIC ACID: CPT | Performed by: FAMILY MEDICINE

## 2022-06-13 PROCEDURE — 99207 E-CONSULT TO BEHAVIORAL HEALTH (ADULT OUTPT PROVIDER TO SPECIALIST WRITTEN QUESTION & RESPONSE): CPT | Performed by: FAMILY MEDICINE

## 2022-06-13 PROCEDURE — 96127 BRIEF EMOTIONAL/BEHAV ASSMT: CPT | Performed by: FAMILY MEDICINE

## 2022-06-13 PROCEDURE — 99451 NTRPROF PH1/NTRNET/EHR 5/>: CPT | Performed by: PSYCHIATRY & NEUROLOGY

## 2022-06-13 PROCEDURE — 84443 ASSAY THYROID STIM HORMONE: CPT | Performed by: FAMILY MEDICINE

## 2022-06-13 PROCEDURE — 99213 OFFICE O/P EST LOW 20 MIN: CPT | Mod: 25 | Performed by: FAMILY MEDICINE

## 2022-06-13 PROCEDURE — 36415 COLL VENOUS BLD VENIPUNCTURE: CPT | Performed by: FAMILY MEDICINE

## 2022-06-13 PROCEDURE — 83036 HEMOGLOBIN GLYCOSYLATED A1C: CPT | Performed by: FAMILY MEDICINE

## 2022-06-13 PROCEDURE — 87389 HIV-1 AG W/HIV-1&-2 AB AG IA: CPT | Performed by: FAMILY MEDICINE

## 2022-06-13 RX ORDER — DESVENLAFAXINE 50 MG/1
50 TABLET, FILM COATED, EXTENDED RELEASE ORAL DAILY
Qty: 90 TABLET | Refills: 0 | Status: SHIPPED | OUTPATIENT
Start: 2022-06-13 | End: 2022-07-13

## 2022-06-13 RX ORDER — FLUOCINONIDE GEL 0.5 MG/G
GEL TOPICAL 2 TIMES DAILY
Qty: 30 G | Refills: 1 | Status: SHIPPED | OUTPATIENT
Start: 2022-06-13 | End: 2023-05-26

## 2022-06-13 RX ORDER — ALLOPURINOL 100 MG/1
100 TABLET ORAL DAILY
Qty: 90 TABLET | Refills: 3 | Status: SHIPPED | OUTPATIENT
Start: 2022-06-13 | End: 2023-05-26

## 2022-06-13 ASSESSMENT — ANXIETY QUESTIONNAIRES: GAD7 TOTAL SCORE: 3

## 2022-06-13 NOTE — PROGRESS NOTES
ALL SMARTFIELDS MUST BE COMPLETED FOR PATIENT CARE AND BILLING    6/13/2022     E-Consult has been accepted.    Interprofessional consultation requested by:  Ursula Huang MD      Clinical Question/Purpose: MY CLINICAL QUESTION IS: how slow should be the  taper off pristiq has been on it for at least 2yrs, unsure if its as beneficial and has been having nausea on it all along    Patient assessment and information reviewed: chart review    Recommendations:   I am glad he is seeing a therapist, but with the length of time he has had this anxiety, it may be time to consider a day treatment for more intensive therapy and skills to deal with the anxiety.  There are no side effects to therapy.  As far as pristiq causing the nausea, I do not think that is the case.  I cannot say for sure, of course, but it is usually a constant nausea and not the issue with the hunger and then nausea.      For tapering off pristiq, I would not recommend cutting medication in half.  It will precipitate the withdrawal as it is hard for the body to absorb the medication with the kind of pill preparation that pristiq has.  It is much better to go down to 25 mg and take that for 2-4 weeks and then try to stop.  If there are symptoms after the 25 mg, then taking it every other day for a few weeks.  Hopefully with the other medication ideas, it will help with any withdrawal symptoms.      There are two mainstays for anxiety that work in different ways.  Propranolol is very good for physical symptoms of anxiety.  Using 20 mg tid prn can be very helpful.  Some people need to schedule this in order to get ahead of the anxiety.  This can be used with the next option of medication.  Propranolol should not be used with someone with asthma and hypotension/bradycardia can occur, so should be monitored, but it generally well tolerated.    Another option is gabapentin.  It works very well for anxiety and is metabolized in the kidney, so no concerns  for liver enzyme genetics.  Gabapentin 100 mg tid is a good starting point.  Most need at least 300 mg tid and it can go up to 1200 mg tid.  It can make one feel sleepy, so sometimes higher doses are used at bedtime.  It does need to be scheduled and not prn.      One last option is trying latuda.  This was one that is metabolized normally with the genetic testing.  It can help with severe anxiety.   Starting at 20 mg once a day and then it can be increased if needed.  Max would be 60 mg for anxiety (can go up to 120 mg for schizophrenia).  It is generally well tolerated but should be stopped if muscle stiffness, parkinsonian symptoms, restlessness or mouth/tongue movements occur.  This is rare.      Thank you for the consult.      The recommendations provided in this E-Consult are based on a review of clinical data pertinent to the clinical question presented, without a review of the patient's complete medical record or, the benefit of a comprehensive in-person or virtual patient evaluation. This consultation should not replace the clinical judgement and evaluation of the provider ordering this E-Consult. Any new clinical issues, or changes in patient status since the filing of this E-Consult will need to be taken into account when assessing these recommendations. Please contact me if you have further questions.    My total time spent reviewing clinical information and formulating assessment was 15 minutes.    Report sent automatically to requesting provider once signed.     John Sanchez MD

## 2022-06-13 NOTE — RESULT ENCOUNTER NOTE
Hi    All labs look normal except for fasting lipid and thyroid.  Fasting lipid is high and abnormal-that goes along with diet.  -LDL(bad) cholesterol level is elevated,   HDL(good) cholesterol level is low and your triglycerides are elevated   Are you able to make diet changes- eat whole food based diet, rich in green leafy veggies, avoid process foods as we talked about it.   In 6 months, recheck your fasting cholesterol panel  is advisable.    TSH is slightly elevated. I do recommend see endocrinologist re grading it .    Please keep us posted with questions or concerns .      Best Regards,    Ursula Huang MD  [facility]  [phone]

## 2022-06-14 NOTE — TELEPHONE ENCOUNTER
AS,  Please see below MyChart message and advise.  Don't see that you signed endocrinology referral yet  Thanks,  Shira BRYSON RN

## 2022-06-17 ENCOUNTER — TELEPHONE (OUTPATIENT)
Dept: ENDOCRINOLOGY | Facility: CLINIC | Age: 37
End: 2022-06-17
Payer: COMMERCIAL

## 2022-06-17 NOTE — TELEPHONE ENCOUNTER
Endocrine triage  The scheduled first available endocrine appointment timeframe is acceptable  Josefina Das MD

## 2022-06-17 NOTE — TELEPHONE ENCOUNTER
[facility] Call Center    Phone Message    May a detailed message be left on voicemail: yes     Reason for Call: Appointment Intake    Referring Provider Name:  Dr Huang       Diagnosis and/or Symptoms: New-Abnormal TSH [R79.89],     Action Taken: Other: ENDO    Travel Screening: Not Applicable

## 2022-06-24 ENCOUNTER — HOSPITAL ENCOUNTER (OUTPATIENT)
Dept: ULTRASOUND IMAGING | Facility: CLINIC | Age: 37
Discharge: HOME | End: 2022-06-24
Attending: FAMILY MEDICINE | Admitting: FAMILY MEDICINE
Payer: COMMERCIAL

## 2022-06-24 DIAGNOSIS — R11.0 NAUSEA: ICD-10-CM

## 2022-06-24 DIAGNOSIS — K21.00 GASTROESOPHAGEAL REFLUX DISEASE WITH ESOPHAGITIS, UNSPECIFIED WHETHER HEMORRHAGE: ICD-10-CM

## 2022-06-24 PROCEDURE — 76700 US EXAM ABDOM COMPLETE: CPT

## 2022-06-24 PROCEDURE — 76700 US EXAM ABDOM COMPLETE: CPT | Mod: 26 | Performed by: RADIOLOGY

## 2022-06-30 NOTE — TELEPHONE ENCOUNTER
RECORDS RECEIVED FROM: Abnormal TSH, referred by Dr. Huang   DATE RECEIVED: 9/1/2022   NOTES (FOR ALL VISITS) STATUS DETAILS   OFFICE NOTES from referring provider [facility]:  6/13/22 - MyChart referral from Dr. Huang  6/13/22, 7/19/21 - PCC OV with Dr. Huang   OFFICE NOTES from other specialist N/A    ED NOTES N/A    OPERATIVE REPORT  (thyroid, pituitary, adrenal, parathyroid) N/A    MEDICATION LIST Internal    IMAGING      XR (Chest) Internal [facility]:  6/14/21 - XR Chest   LABS     DIABETES: HBGA1C, CREATININE, FASTING LIPIDS, MICROALBUMIN URINE, POTASSIUM, TSH, T4    THYROID: TSH, T4, CBC, THYRODLONULIN, TOTAL T3, FREE T4, CALCITONIN, CEA Internal   [facility]:  6/13/22 - BMP  6/13/22 - HBGA1C  6/13/22 - Lipid  6/13/22 - TSH, T4  9/27/18 - CMP  8/29/16 - Thyroid Peroxidase  11/1/13 - Glucose  10/25/13 - CBC

## 2022-07-11 DIAGNOSIS — G43.709 CHRONIC MIGRAINE WITHOUT AURA WITHOUT STATUS MIGRAINOSUS, NOT INTRACTABLE: ICD-10-CM

## 2022-07-21 ASSESSMENT — HEADACHE IMPACT TEST (HIT 6)
HOW OFTEN DO HEADACHES LIMIT YOUR DAILY ACTIVITIES: SOMETIMES
WHEN YOU HAVE HEADACHES HOW OFTEN IS THE PAIN SEVERE: SOMETIMES
HIT6 TOTAL SCORE: 57
HOW OFTEN HAVE YOU FELT FED UP OR IRRITATED BECAUSE OF YOUR HEADACHES: RARELY
HOW OFTEN HAVE YOU FELT TOO TIRED TO WORK BECAUSE OF YOUR HEADACHES: RARELY
WHEN YOU HAVE A HEADACHE HOW OFTEN DO YOU WISH YOU COULD LIE DOWN: VERY OFTEN
HOW OFTEN DID HEADACHS LIMIT CONCENTRATION ON WORK OR DAILY ACTIVITY: SOMETIMES

## 2022-07-27 ENCOUNTER — VIRTUAL VISIT (OUTPATIENT)
Dept: NEUROLOGY | Facility: CLINIC | Age: 37
End: 2022-07-27
Payer: COMMERCIAL

## 2022-07-27 DIAGNOSIS — G43.709 CHRONIC MIGRAINE WITHOUT AURA WITHOUT STATUS MIGRAINOSUS, NOT INTRACTABLE: Primary | ICD-10-CM

## 2022-07-27 PROCEDURE — 99213 OFFICE O/P EST LOW 20 MIN: CPT | Mod: 95 | Performed by: PSYCHIATRY & NEUROLOGY

## 2022-07-27 ASSESSMENT — MIGRAINE DISABILITY ASSESSMENT (MIDAS)
HOW MANY DAYS DID YOU MISS WORK OR SCHOOL BECAUSE OF HEADACHES: 0
HOW MANY DAYS DID YOU NOT DO HOUSEWORK BECAUSE OF HEADACHES: 0
HOW OFTEN WERE SOCIAL ACTIVITIES MISSED DUE TO HEADACHES: 3
HOW MANY DAYS WAS YOUR PRODUCTIVITY CUT IN HALF BECAUSE OF HEADACHES: 6
HOW MANY DAYS IN THE PAST 3 MONTHS HAVE YOU HAD A HEADACHE: 60
ON A SCALE FROM 0-10 ON AVERAGE HOW PAINFUL WERE HEADACHES: 3
TOTAL SCORE: 21
HOW MANY DAYS WAS HOUSEWORK PRODUCTIVITY CUT IN HALF DUE TO HEADACHES: 12

## 2022-07-27 NOTE — LETTER
7/27/2022       RE: Eliezer Ellis  [address]  [facility] [address]     Dear Colleague,    Thank you for referring your patient, Eliezer Ellis, to the [facility] NEUROLOGY CLINIC [place] [facility]. Please see a copy of my visit note below.    [facility]  Neurology Progress Note    Subjective:    Mr. Ellis returns for follow-up of chronic migraine.    He is not sure about Emgality and his headaches. He denies worsening of headache since starting it, but he has noticed no improvements.    He is not having any severe headaches, but he continues to have moderate headaches 4-5 days per week.     His headaches interfere with his life, require taking more breaks, cool eye mask and laying down, or he will avoid activity.     He takes ibuprofen or Tylenol or Excedrin as needed. He is not taking naratriptan currently.    He has nausea pretty regularly, as well. He had an ultrasound, with fatty liver identified.     Objective:    Vitals: There were no vitals taken for this visit.  General: Cooperative, NAD  Neurologic:  Mental Status: Fully alert, attentive and oriented. Speech clear and fluent.   Cranial Nerves: Facial movements symmetric.   Motor: No abnormal movements.      Assessment/Plan:   Eliezer Ellis is a 37-year-old man who returns for follow-up of chronic migraine without aura.  He has previously taken amitriptyline, topiramate, and propranolol, which were not effective and caused intolerable side effects.  Recent Emgality trial was not effective.     For acute treatment of headache, I recommend the following:  -For acute min of mild headache, he will continue ibuprofen as needed, not to exceed more than 14 days/month to avoid medication overuse.  -For acute treatment of moderate to severe headache, he will continue naratriptan 2.5 mg at the onset of headache, with a repeat dose in 4  hours if needed.  Should not exceed more than 9 days/month to avoid medication overuse.  -For nausea related to headache, or as a rescue treatment for migraine, I recommend prochlorperazine 10 mg as needed, not to exceed more than 9 days/month to avoid medication side effects.     His headache frequency and severity warrants prevention.   -I recommend a trial of botulinum toxin injections using a chronic migraine protocol every 12 weeks.  Potential side effects were discussed.  I recommend 3 sets of injections prior to determining effectiveness.  -If  botulinum toxin injections are not tolerated or not effective after adequate trial, alternative options could include gabapentin or verapamil.     I will plan to see him back to for set of injections, or after the second set of injections to review his progress.      Sol Linares MD  Neurology

## 2022-07-27 NOTE — PROGRESS NOTES
Eliezer is a 37 year old who is being evaluated via a billable video visit.      How would you like to obtain your AVS? MyChart  If the video visit is dropped, the invitation should be resent by: Send to e-mail at: [email or web address]  Will anyone else be joining your video visit? No        Video-Visit Details    Video Start Time: 11:03 AM    Type of service:  Video Visit    Video End Time:11:24 AM    Originating Location (pt. Location): Home    Distant Location (provider location):  [facility] NEUROLOGY CLINIC [facility]     Platform used for Video Visit: [email or web address]     [facility]  Neurology Progress Note    Subjective:    Mr. Ellis returns for follow-up of chronic migraine.    He is not sure about Emgality and his headaches. He denies worsening of headache since starting it, but he has noticed no improvements.    He is not having any severe headaches, but he continues to have moderate headaches 4-5 days per week.     His headaches interfere with his life, require taking more breaks, cool eye mask and laying down, or he will avoid activity.     He takes ibuprofen or Tylenol or Excedrin as needed. He is not taking naratriptan currently.    He has nausea pretty regularly, as well. He had an ultrasound, with fatty liver identified.     Objective:    Vitals: There were no vitals taken for this visit.  General: Cooperative, NAD  Neurologic:  Mental Status: Fully alert, attentive and oriented. Speech clear and fluent.   Cranial Nerves: Facial movements symmetric.   Motor: No abnormal movements.      Assessment/Plan:   Eliezer Ellis is a 37-year-old man who returns for follow-up of chronic migraine without aura.  He has previously taken amitriptyline, topiramate, and propranolol, which were not effective and caused intolerable side effects.  Recent Emgality trial was not effective.     For acute treatment of headache, I recommend the following:  -For acute min  of mild headache, he will continue ibuprofen as needed, not to exceed more than 14 days/month to avoid medication overuse.  -For acute treatment of moderate to severe headache, he will continue naratriptan 2.5 mg at the onset of headache, with a repeat dose in 4 hours if needed.  Should not exceed more than 9 days/month to avoid medication overuse.  -For nausea related to headache, or as a rescue treatment for migraine, I recommend prochlorperazine 10 mg as needed, not to exceed more than 9 days/month to avoid medication side effects.     His headache frequency and severity warrants prevention.   -I recommend a trial of botulinum toxin injections using a chronic migraine protocol every 12 weeks.  Potential side effects were discussed.  I recommend 3 sets of injections prior to determining effectiveness.  -If  botulinum toxin injections are not tolerated or not effective after adequate trial, alternative options could include gabapentin or verapamil.     I will plan to see him back to for set of injections, or after the second set of injections to review his progress.    Sol Linares MD  Neurology

## 2022-07-27 NOTE — NURSING NOTE
Chief Complaint   Patient presents with     Video Visit     Follow up on Headaches and Migraines

## 2022-07-29 ENCOUNTER — CARE COORDINATION (OUTPATIENT)
Dept: NEUROLOGY | Facility: CLINIC | Age: 37
End: 2022-07-29

## 2022-07-29 NOTE — PROGRESS NOTES
Additional information for botox prior authorization:      1. Diagnosis: Chronic migraine without aura without status migrainosus, not intractable G43.709   2. This is initial request for botox treatment   3. Botox ordered by a neurologist   4. Patient has 16-20 headache days/monh each lasting longer than 4 hours   5. Comprehensive review of all current medications conducted.    6. Rebound headache risk strategies utilized.    7. Has tried/failed: amitriptyline, topiramate, and propranolol which were not effective and caused intolerable side effects   8. Will not use in combination with a CGRP. He has stopped Emgality as it was not effective.       Mary Lou MODI

## 2022-08-18 ASSESSMENT — HEADACHE IMPACT TEST (HIT 6)
WHEN YOU HAVE A HEADACHE HOW OFTEN DO YOU WISH YOU COULD LIE DOWN: SOMETIMES
HOW OFTEN DO HEADACHES LIMIT YOUR DAILY ACTIVITIES: RARELY
HOW OFTEN DID HEADACHS LIMIT CONCENTRATION ON WORK OR DAILY ACTIVITY: SOMETIMES
HIT6 TOTAL SCORE: 54
HOW OFTEN HAVE YOU FELT FED UP OR IRRITATED BECAUSE OF YOUR HEADACHES: SOMETIMES
WHEN YOU HAVE HEADACHES HOW OFTEN IS THE PAIN SEVERE: RARELY
HOW OFTEN HAVE YOU FELT TOO TIRED TO WORK BECAUSE OF YOUR HEADACHES: RARELY

## 2022-08-22 ENCOUNTER — OFFICE VISIT (OUTPATIENT)
Dept: NEUROLOGY | Facility: CLINIC | Age: 37
End: 2022-08-22
Payer: COMMERCIAL

## 2022-08-22 ENCOUNTER — MYC REFILL (OUTPATIENT)
Dept: FAMILY MEDICINE | Facility: CLINIC | Age: 37
End: 2022-08-22

## 2022-08-22 DIAGNOSIS — F41.9 ANXIETY: ICD-10-CM

## 2022-08-22 DIAGNOSIS — F32.9 REACTIVE DEPRESSION: ICD-10-CM

## 2022-08-22 DIAGNOSIS — G43.709 CHRONIC MIGRAINE WITHOUT AURA WITHOUT STATUS MIGRAINOSUS, NOT INTRACTABLE: Primary | ICD-10-CM

## 2022-08-22 PROCEDURE — 64615 CHEMODENERV MUSC MIGRAINE: CPT | Performed by: PSYCHIATRY & NEUROLOGY

## 2022-08-22 NOTE — LETTER
8/22/2022       RE: Eliezer Ellis  [address]  [facility] [address]     Dear Colleague,    Thank you for referring your patient, Eliezer Ellis, to the [facility] NEUROLOGY CLINIC [facility]. Please see a copy of my visit note below.      August 22, 2022      Procedure:  OnabotulinumtoxinA injections for chronic migraine  Indication:  Chronic migraine    Mr. Ellis suffers from severe intractable headaches.  He was referred by Dr. Linares for onabotulinumtoxinA injections for headache.  Risks, benefits, and alternatives were discussed.  All questions were answered and consent given.  He decided to proceed with the injections.     Prior to initiation of botulinum toxin injections, Mr. Ellis reported 20 headache days per month, with 20 severe headache days per month. His headaches are quite disabling and often interfere with his ability to function normally.    He has attempted other migraine prophylactic treatments in the past, which have included: Emgality, desvenlafaxine, topiramate, amitriptyline, buspirone, venlafaxine, propranolol.      He currently takes desvenlafaxine for headache prevention.    Mr. Ellis's pain was assessed prior to the procedure.  He rated his pain today as 0 out of 10.    Procedural Pause: Procedural pause was conducted to verify correct patient identity, procedure to be performed, correct side and site, correct patient position, and special requirements. Appropriate hand hygiene was utilized, and each injection site was prepped with alcohol wipes or Chloraprep swab.     Procedure Details: 200 units of onabotulinumtoxinA was diluted in 4 mL 0.9% normal saline. A total of 150 units of onabotulinumtoxinA were injected using 30 gauge 0.5 in needles into the muscles listed below. 50 units of onabotulinumtoxinA were wasted.     Injection Sites: Total = 150 units onabotulinumtoxinA      and Procerus muscles - 5  units into the left and right corrugators and 5 units into the procerus (15 units total)    Frontalis muscles - 5 units into the left superior frontalis and 5 units into the right superior frontalis (2 injection sites per muscle) (10 units total)    Temporalis muscles - 12.5 units into the left temporalis muscle and 12.5 units into the right temporalis muscle (2 injection sites per muscle) (25 units total)    Occipitalis muscles - 12.5 units into the left occipitalis muscle and 12.5 units into the right occipitalis muscle (2 injection sites per muscle) (25 units total)    Splenius Capitis muscles - 12.5 units into the left splenius capitis muscle and 12.5 units into the right splenius capitis muscle (2 injection sites per muscle, divided into 2/3 anteriorly and 1/3 posteriorly) (25 units total)      Trapezius muscles - 25 units into the left trapezius muscle and 25 units into the right trapezius muscle (3 injection sites per muscle, divided 5 units, 10 units, 10 units, medial to lateral) (50 units total)    Mr. Ellis tolerated the procedure well without immediate complications.  He will follow up in clinic for assessment of the effectiveness of treatment.  He did not report any change in his pain level after the botulinumtoxinA injection procedure.      Sol Linares MD  Headache Neurology  [facility]

## 2022-08-22 NOTE — PROGRESS NOTES
[facility]  Botulinum Toxin Procedure    Sol Linares MD  Headache Neurology    August 22, 2022    Procedure:  OnabotulinumtoxinA injections for chronic migraine  Indication:  Chronic migraine    Mr. Ellis suffers from severe intractable headaches.  He was referred by Dr. Linares for onabotulinumtoxinA injections for headache.  Risks, benefits, and alternatives were discussed.  All questions were answered and consent given.  He decided to proceed with the injections.     Prior to initiation of botulinum toxin injections, Mr. Ellis reported 20 headache days per month, with 20 severe headache days per month. His headaches are quite disabling and often interfere with his ability to function normally.    He has attempted other migraine prophylactic treatments in the past, which have included: Emgality, desvenlafaxine, topiramate, amitriptyline, buspirone, venlafaxine, propranolol.      He currently takes desvenlafaxine for headache prevention.    Mr. Ellis's pain was assessed prior to the procedure.  He rated his pain today as 0 out of 10.    Procedural Pause: Procedural pause was conducted to verify correct patient identity, procedure to be performed, correct side and site, correct patient position, and special requirements. Appropriate hand hygiene was utilized, and each injection site was prepped with alcohol wipes or Chloraprep swab.     Procedure Details: 200 units of onabotulinumtoxinA was diluted in 4 mL 0.9% normal saline. A total of 150 units of onabotulinumtoxinA were injected using 30 gauge 0.5 in needles into the muscles listed below. 50 units of onabotulinumtoxinA were wasted.     Injection Sites: Total = 150 units onabotulinumtoxinA      and Procerus muscles - 5 units into the left and right corrugators and 5 units into the procerus (15 units total)    Frontalis muscles - 5 units into the left superior frontalis and 5 units into the right superior frontalis (2 injection sites per muscle)  (10 units total)    Temporalis muscles - 12.5 units into the left temporalis muscle and 12.5 units into the right temporalis muscle (2 injection sites per muscle) (25 units total)    Occipitalis muscles - 12.5 units into the left occipitalis muscle and 12.5 units into the right occipitalis muscle (2 injection sites per muscle) (25 units total)    Splenius Capitis muscles - 12.5 units into the left splenius capitis muscle and 12.5 units into the right splenius capitis muscle (2 injection sites per muscle, divided into 2/3 anteriorly and 1/3 posteriorly) (25 units total)      Trapezius muscles - 25 units into the left trapezius muscle and 25 units into the right trapezius muscle (3 injection sites per muscle, divided 5 units, 10 units, 10 units, medial to lateral) (50 units total)    Mr. Ellis tolerated the procedure well without immediate complications.  He will follow up in clinic for assessment of the effectiveness of treatment.  He did not report any change in his pain level after the botulinumtoxinA injection procedure.    Sol Linares MD  Headache Neurology  [facility]

## 2022-08-23 RX ORDER — DESVENLAFAXINE 25 MG/1
25 TABLET, EXTENDED RELEASE ORAL DAILY
Qty: 1 TABLET | Refills: 0 | OUTPATIENT
Start: 2022-08-23

## 2022-09-01 ENCOUNTER — PRE VISIT (OUTPATIENT)
Dept: ENDOCRINOLOGY | Facility: CLINIC | Age: 37
End: 2022-09-01

## 2022-09-20 ENCOUNTER — VIRTUAL VISIT (OUTPATIENT)
Dept: FAMILY MEDICINE | Facility: CLINIC | Age: 37
End: 2022-09-20
Payer: COMMERCIAL

## 2022-09-20 DIAGNOSIS — R51.9 CHRONIC DAILY HEADACHE: ICD-10-CM

## 2022-09-20 DIAGNOSIS — R11.0 NAUSEA: ICD-10-CM

## 2022-09-20 DIAGNOSIS — F41.9 ANXIETY: Primary | ICD-10-CM

## 2022-09-20 DIAGNOSIS — G43.709 CHRONIC MIGRAINE WITHOUT AURA WITHOUT STATUS MIGRAINOSUS, NOT INTRACTABLE: ICD-10-CM

## 2022-09-20 DIAGNOSIS — F32.9 REACTIVE DEPRESSION: ICD-10-CM

## 2022-09-20 PROCEDURE — 99215 OFFICE O/P EST HI 40 MIN: CPT | Mod: 95 | Performed by: FAMILY MEDICINE

## 2022-09-20 RX ORDER — DESVENLAFAXINE 50 MG/1
50 TABLET, FILM COATED, EXTENDED RELEASE ORAL DAILY
Qty: 30 TABLET | Refills: 3 | Status: SHIPPED | OUTPATIENT
Start: 2022-09-20 | End: 2022-11-14

## 2022-09-20 ASSESSMENT — ANXIETY QUESTIONNAIRES
7. FEELING AFRAID AS IF SOMETHING AWFUL MIGHT HAPPEN: SEVERAL DAYS
2. NOT BEING ABLE TO STOP OR CONTROL WORRYING: SEVERAL DAYS
3. WORRYING TOO MUCH ABOUT DIFFERENT THINGS: SEVERAL DAYS
5. BEING SO RESTLESS THAT IT IS HARD TO SIT STILL: NOT AT ALL
GAD7 TOTAL SCORE: 9
1. FEELING NERVOUS, ANXIOUS, OR ON EDGE: MORE THAN HALF THE DAYS
6. BECOMING EASILY ANNOYED OR IRRITABLE: MORE THAN HALF THE DAYS
IF YOU CHECKED OFF ANY PROBLEMS ON THIS QUESTIONNAIRE, HOW DIFFICULT HAVE THESE PROBLEMS MADE IT FOR YOU TO DO YOUR WORK, TAKE CARE OF THINGS AT HOME, OR GET ALONG WITH OTHER PEOPLE: SOMEWHAT DIFFICULT
GAD7 TOTAL SCORE: 9

## 2022-09-20 ASSESSMENT — PATIENT HEALTH QUESTIONNAIRE - PHQ9
SUM OF ALL RESPONSES TO PHQ QUESTIONS 1-9: 7
5. POOR APPETITE OR OVEREATING: MORE THAN HALF THE DAYS

## 2022-09-20 NOTE — PROGRESS NOTES
Eliezer is a 37 year old who is being evaluated via a billable video visit.      How would you like to obtain your AVS? MyChart  If the video visit is dropped, the invitation should be resent by: Send to e-mail at: [email or web address]  Will anyone else be joining your video visit? No          Assessment & Plan   Eliezer is 37 year old with known history of anxiety and reactive depression.,  Intolerance to multiple SSRIs/SNRI.  Anxiety  Plan: resume - desvenlafaxine (PRISTIQ) 50 MG 24 hr tablet; Take 1 tablet (50 mg) by mouth daily- once daily. Follow up with me in 4 weeks.  Advised about therapy but has to find out if insurance covers the same therapist.  Advised about psychiatric consultation  - Adult Mental Health [facility] Referral; Future  RICHARD-7 SCORE 7/19/2021 6/10/2022 9/20/2022   Total Score - - -   Total Score 5 3 9     E-consult from psychiatrist recommendations discussed again with patient.  1.Therapy very important- has no side effects . Consider a day treatment for more intensive therapy and skills to deal with the anxiety.    If he needs if insurance covers therapy he is trying to see the same therapist.     2.  Beta-blocker propranolol was suggested, given his history of asthma, its not advisable.  Patient feels he is not really having anxiety attack.    3.Another option is gabapentin.  It works very well for anxiety and is metabolized in the kidney, so no concerns for liver enzyme genetics.    Gabapentin 100 mg three times daily is a good starting point.    It can make one feel sleepy, so sometimes higher doses are used at bedtime.  He is not interested in starting any new medication.     4. .One last option is trying latuda.  This was one that is metabolized normally with the genetic testing.  It can help with severe anxiety.   Starting at 20 mg once a day and then it can be increased if needed.  Max would be 60 mg for anxiety .  It is generally well tolerated but should be stopped if muscle stiffness,  parkinsonian symptoms, restlessness or mouth/tongue movements occur.  This is rare.     Reactive depression  Plan:- desvenlafaxine (PRISTIQ) 50 MG 24 hr tablet; Take 1 tablet (50 mg) by mouth daily  - Adult Mental Health  Referral; Future  PHQ 7/19/2021 6/10/2022 9/20/2022   PHQ-9 Total Score 3 3 7   Q9: Thoughts of better off dead/self-harm past 2 weeks Not at all Not at all Not at all     Nausea  Plan: He has been taking omeprazole for reflux.  I do question if current nausea is actually side effect of medication that he has requested to continue.     Been a while rule out H. pylori and stool test that was requested in June and patient does have supplies and is willing to complete the test.      I do recommend to see gastroenterologist if nausea is consistent  As far as pristiq causing the nausea, - it is usually a constant nausea and not the issue with the hunger and then nausea.     - Adult GI  Referral - Consult Only; Future and consider endoscopy if advised.    Chronic migraine without aura without status migrainosus, not intractable  Chronic daily headache  Plan.;- considered this problem when making today's plans       -followed by specialist and encouraged to continue with specialist consultation.  He had first Botox in August, and has plans to get another next month.  He reports 04 migraines and daily headaches are quite unchanged..      Prescription drug management  I spent a total of 43 minutes on the day of the visit.   Time spent doing chart review, history and exam, documentation and further activities per the note           Return in about 4 weeks (around 10/18/2022) for virtual/video visit, concerns,unresolved, mood.    Ursula Huang MD  [facility]    Sowmya Martins is a 37 year old  presenting for the following health issues:  Nausea      History of Present Illness       Reason for visit:  Nausea follow up and Pristiq medication question    He eats  2-3 servings of fruits and vegetables daily.He consumes 1 sweetened beverage(s) daily.He exercises with enough effort to increase his heart rate 9 or less minutes per day.  He exercises with enough effort to increase his heart rate 3 or less days per week.   He is taking medications regularly.     Nausea mostly afternoon/ evening- Frequently to daily  Impacting life. Feels like hunger jono to nausea, sometimes intense nausea last for 15-20 min- and each episode resolves spontaneously.  Sometimes at home frizzy drink helps.  Has taken omeprazole      Did try to taper off pristique  that nausea may improve. Unsure if nausea changed but mental health is horrible   Requesting to be back at 50 mg, instead of current dose of 25mg once daily.  Tried 25 mg up to about 2 months.  Does not want to try any new medications   Does not feel nausea is anxiety attack.  Wants to know the cause of nausea .  It does subside on own remedies       Chronic Migraine  & daily  headache   Seen by neurologist.  Got botox 08/22/22- unsure how much ehlpful     Review of Systems   Constitutional, HEENT, cardiovascular, pulmonary, GI, , musculoskeletal, neuro, skin, endocrine and psych systems are negative, except as otherwise noted.      Objective           Vitals:  No vitals were obtained today due to virtual visit.    Physical Exam   GENERAL: Healthy, alert and no distress  EYES: Eyes grossly normal to inspection.  No discharge or erythema, or obvious scleral/conjunctival abnormalities.  RESP: No audible wheeze, cough, or visible cyanosis.  No visible retractions or increased work of breathing.    SKIN: Visible skin clear. No significant rash, abnormal pigmentation or lesions.  NEURO: Cranial nerves grossly intact.  Mentation and speech appropriate for age.  PSYCH: Mentation appears normal, affect normal/bright, judgement and insight intact, normal speech and appearance well-groomed.  PHQ 7/19/2021 6/10/2022 9/20/2022   PHQ-9 Total Score 3  3 7   Q9: Thoughts of better off dead/self-harm past 2 weeks Not at all Not at all Not at all     RICHARD-7 SCORE 7/19/2021 6/10/2022 9/20/2022   Total Score - - -   Total Score 5 3 9                   Video-Visit Details    Video Start Time: 5:38 PM    Type of service:  Video Visit    Video End Time:6:15 PM    Originating Location (pt. Location): Home    Distant Location (provider location):  [facility]     Platform used for Video Visit: James

## 2022-09-20 NOTE — PATIENT INSTRUCTIONS
I have shared follow -econsult recommendation from psychiatrist     1.Therapy very important- has no side effects . Consider a day treatment for more intensive therapy and skills to deal with the anxiety.      As far as pristiq causing the nausea, - it is usually a constant nausea and not the issue with the hunger and then nausea.            2.Another option is gabapentin.  It works very well for anxiety and is metabolized in the kidney, so no concerns for liver enzyme genetics.    Gabapentin 100 mg three times daily is a good starting point.    It can make one feel sleepy, so sometimes higher doses are used at bedtime.         3.One last option is trying latuda.  This was one that is metabolized normally with the genetic testing.  It can help with severe anxiety.   Starting at 20 mg once a day and then it can be increased if needed.  Max would be 60 mg for anxiety .  It is generally well tolerated but should be stopped if muscle stiffness, parkinsonian symptoms, restlessness or mouth/tongue movements occur.  This is rare.      You have chosen to continue pristque at 50 mg once daily, because mood was much better in terms of anxiety and depression  Make appointment with psychiatrist even if its months from  now.  Follow up with me prior to that in 4 weeks, ideally in person.      Please complete the Helicobacter Pylori  stool test.  Consider Gastroenterology consultation to discuss unresolved nausea & see if they would recommend endoscopy.

## 2022-10-04 NOTE — TELEPHONE ENCOUNTER
REFERRAL INFORMATION:    Referring Provider: Dr. Ursula Huang     Referring Clinic:   [facility]     Reason for Visit/Diagnosis: Nausea      FUTURE VISIT INFORMATION:    Appointment Date: 11/22/2022    Appointment Time: 4:20 PM      NOTES STATUS DETAILS   OFFICE NOTE from Referring Provider Internal 9/20/2022 Office visit with Dr. Huang     OFFICE NOTE from Other Specialist N/A    HOSPITAL DISCHARGE SUMMARY/  ED VISITS N/A    OPERATIVE REPORT N/A    MEDICATION LIST Internal         ENDOSCOPY  N/A    COLONOSCOPY N/A    ERCP N/A    EUS N/A    STOOL TESTING N/A    PERTINENT LABS Internal    PATHOLOGY REPORTS (RELATED) N/A    IMAGING (CT, MRI, EGD, MRCP, Small Bowel Follow Through/SBT, MR/CT Enterography) Internal US Abdomen: 6/24/2022

## 2022-10-21 ENCOUNTER — LAB (OUTPATIENT)
Dept: LAB | Facility: CLINIC | Age: 37
End: 2022-10-21
Payer: COMMERCIAL

## 2022-10-21 DIAGNOSIS — K21.00 GASTROESOPHAGEAL REFLUX DISEASE WITH ESOPHAGITIS: Primary | ICD-10-CM

## 2022-10-21 PROCEDURE — 87338 HPYLORI STOOL AG IA: CPT

## 2022-10-24 LAB — H PYLORI AG STL QL IA: NEGATIVE

## 2022-10-25 ENCOUNTER — TELEPHONE (OUTPATIENT)
Dept: FAMILY MEDICINE | Facility: CLINIC | Age: 37
End: 2022-10-25

## 2022-10-25 DIAGNOSIS — K21.9 GASTROESOPHAGEAL REFLUX DISEASE WITHOUT ESOPHAGITIS: Primary | ICD-10-CM

## 2022-10-25 NOTE — TELEPHONE ENCOUNTER
Prior Authorization Retail Medication Request    Medication/Dose:   ICD code (if different than what is on RX):  K21.00/R11.1/R63.5  Previously Tried and Failed:    Rationale:      Insurance Name:  [employer]  Insurance ID:    [id number]    Pharmacy Information (if different than what is on RX)  Name: [id number] - [place], [place] - [address] AT [facility] [address]   Phone:

## 2022-10-27 RX ORDER — PANTOPRAZOLE SODIUM 40 MG/1
40 TABLET, DELAYED RELEASE ORAL DAILY
Qty: 30 TABLET | Refills: 11 | Status: SHIPPED | OUTPATIENT
Start: 2022-10-27 | End: 2023-01-26

## 2022-10-27 NOTE — TELEPHONE ENCOUNTER
1. Gastroesophageal reflux disease without esophagitis    - pantoprazole (PROTONIX) 40 MG EC tablet; Take 1 tablet (40 mg) by mouth daily  Dispense: 30 tablet; Refill: 11 instead of her omeprazole,    He can try above.  I hope it is covered and helpful.  If not please advised patient to reach out.  Thanks

## 2022-10-27 NOTE — TELEPHONE ENCOUNTER
A.S.      Please see messages below  Omeprazole PA denied.    Denial Rationale: Patient's plan does not cover this medication for members over 12 years old. Patient may receive medication but will have to pay out of pocket (or use coupon/discount card) or buy OTC.     Appeal Information: N/A- no PA or appeal can be done.    Do you want to try alternative or let patient know needs to pay out-of-pocket?    Anabel Hahn RN

## 2022-11-14 ENCOUNTER — VIRTUAL VISIT (OUTPATIENT)
Dept: FAMILY MEDICINE | Facility: CLINIC | Age: 37
End: 2022-11-14
Payer: COMMERCIAL

## 2022-11-14 DIAGNOSIS — S29.011A MUSCLE STRAIN OF ANTERIOR CHEST WALL: ICD-10-CM

## 2022-11-14 DIAGNOSIS — G47.9 SLEEP DISTURBANCE: ICD-10-CM

## 2022-11-14 DIAGNOSIS — F41.9 ANXIETY: Primary | ICD-10-CM

## 2022-11-14 DIAGNOSIS — F32.9 REACTIVE DEPRESSION: ICD-10-CM

## 2022-11-14 DIAGNOSIS — K21.00 GASTROESOPHAGEAL REFLUX DISEASE WITH ESOPHAGITIS, UNSPECIFIED WHETHER HEMORRHAGE: ICD-10-CM

## 2022-11-14 DIAGNOSIS — G43.709 CHRONIC MIGRAINE WITHOUT AURA WITHOUT STATUS MIGRAINOSUS, NOT INTRACTABLE: ICD-10-CM

## 2022-11-14 DIAGNOSIS — M1A.4720 CHRONIC GOUT DUE TO OTHER SECONDARY CAUSE INVOLVING TOE OF LEFT FOOT WITHOUT TOPHUS: ICD-10-CM

## 2022-11-14 PROCEDURE — 99214 OFFICE O/P EST MOD 30 MIN: CPT | Mod: 95 | Performed by: FAMILY MEDICINE

## 2022-11-14 PROCEDURE — 96127 BRIEF EMOTIONAL/BEHAV ASSMT: CPT | Mod: 95 | Performed by: FAMILY MEDICINE

## 2022-11-14 RX ORDER — ALLOPURINOL 100 MG/1
100 TABLET ORAL DAILY
Qty: 90 TABLET | Refills: 3 | Status: CANCELLED | OUTPATIENT
Start: 2022-11-14

## 2022-11-14 RX ORDER — DESVENLAFAXINE 50 MG/1
50 TABLET, FILM COATED, EXTENDED RELEASE ORAL DAILY
Qty: 90 TABLET | Refills: 3 | Status: SHIPPED | OUTPATIENT
Start: 2022-11-14 | End: 2023-10-26

## 2022-11-14 ASSESSMENT — ANXIETY QUESTIONNAIRES
1. FEELING NERVOUS, ANXIOUS, OR ON EDGE: SEVERAL DAYS
3. WORRYING TOO MUCH ABOUT DIFFERENT THINGS: NOT AT ALL
IF YOU CHECKED OFF ANY PROBLEMS ON THIS QUESTIONNAIRE, HOW DIFFICULT HAVE THESE PROBLEMS MADE IT FOR YOU TO DO YOUR WORK, TAKE CARE OF THINGS AT HOME, OR GET ALONG WITH OTHER PEOPLE: SOMEWHAT DIFFICULT
GAD7 TOTAL SCORE: 4
7. FEELING AFRAID AS IF SOMETHING AWFUL MIGHT HAPPEN: NOT AT ALL
GAD7 TOTAL SCORE: 4
5. BEING SO RESTLESS THAT IT IS HARD TO SIT STILL: NOT AT ALL
6. BECOMING EASILY ANNOYED OR IRRITABLE: SEVERAL DAYS
2. NOT BEING ABLE TO STOP OR CONTROL WORRYING: SEVERAL DAYS

## 2022-11-14 ASSESSMENT — PATIENT HEALTH QUESTIONNAIRE - PHQ9
SUM OF ALL RESPONSES TO PHQ QUESTIONS 1-9: 3
SUM OF ALL RESPONSES TO PHQ QUESTIONS 1-9: 3
10. IF YOU CHECKED OFF ANY PROBLEMS, HOW DIFFICULT HAVE THESE PROBLEMS MADE IT FOR YOU TO DO YOUR WORK, TAKE CARE OF THINGS AT HOME, OR GET ALONG WITH OTHER PEOPLE: NOT DIFFICULT AT ALL
5. POOR APPETITE OR OVEREATING: SEVERAL DAYS

## 2022-11-14 NOTE — PROGRESS NOTES
Eliezer is a 37 year old who is being evaluated via a billable video visit.      How would you like to obtain your AVS? MyChart  If the video visit is dropped, the invitation should be resent by:   Will anyone else be joining your video visit? No          Assessment & Plan     Anxiety  Plan:  Improved and Pristiq- has been well tolerated by him  Refill given for 1 yr.  Follow up as needed  Or in 4-6 months  Has a therapist- will call to [facility] appointment as needed - over all doing well   - desvenlafaxine (PRISTIQ) 50 MG 24 hr tablet; Take 1 tablet (50 mg) by mouth daily  - EMOTIONAL / BEHAVIORAL ASSESSMENT  RICHARD-7 SCORE 6/10/2022 9/20/2022 11/14/2022   Total Score - - -   Total Score 3 9 4       Reactive depression  Plan: doing well on - desvenlafaxine (PRISTIQ) 50 MG 24 hr tablet; Take 1 tablet (50 mg) by mouth daily  - EMOTIONAL / BEHAVIORAL ASSESSMENT  PHQ 6/10/2022 9/20/2022 11/14/2022   PHQ-9 Total Score 3 7 3   Q9: Thoughts of better off dead/self-harm past 2 weeks Not at all Not at all Not at all     Gastroesophageal reflux disease with esophagitis, unspecified whether hemorrhage  Plan: feels better on it- and refill is given  - omeprazole (PRILOSEC) 20 MG DR capsule; Take 1 capsule (20 mg) by mouth daily      Chronic migraine without aura without status migrainosus, not intractable  Chronic daily headache  Plan: He had 2nd  Botox due next week  - considered this problem when making today's plans  - no interventions today       -followed by specialist.      Rt anterior chest wall strain  Consider PHYSICAL THERAPY  [facility], [phone]      ? Sleep disturbances  Partner noted loud snoring- also wondering if he has sleep apnea  Ok to get the referral.  I have encouarged weight loss , that helps with sleep and over all well being  Ordering of each unique test  Prescription drug management         Work on weight loss  Regular exercise    No follow-ups on file.   Follow-up  Visit   Expected date:  Feb 14, 2023 (Approximate)      Follow Up Appointment Details:     Follow-up with whom?: PCP    Follow-Up for what?: Chronic Disease f/u    Chronic Disease f/u:  Anxiety  Depression       How?: In Person    Is this an as-needed follow-up?: Yodit Huang MD  [facility]    Sowmya Martins is a 37 year old   presenting for the following health issues:  RECHECK      History of Present Illness       Reason for visit:  Follow up from last visit    He eats 2-3 servings of fruits and vegetables daily.He consumes 0 sweetened beverage(s) daily.He exercises with enough effort to increase his heart rate 10 to 19 minutes per day.  He exercises with enough effort to increase his heart rate 4 days per week.   He is taking medications regularly.       A few month ago- slipped on sandal     Xray right ribs negative   Recently- coughing hurts and on bending over- something catching     Review of Systems  Feverish since overnight. Chills and followed by sweating. Feeling a bit better. Rapid covid negative 2 days ago.slight cough  Plan on repeating the COVID    Constitutional, HEENT, cardiovascular, pulmonary, GI, , musculoskeletal, neuro, skin, endocrine and psych systems are negative, except as otherwise noted.      Objective           Vitals:  No vitals were obtained today due to virtual visit.    Physical Exam   GENERAL: Healthy, alert and no distress  EYES: Eyes grossly normal to inspection.  No discharge or erythema, or obvious scleral/conjunctival abnormalities.  RESP: No audible wheeze, cough, or visible cyanosis.  No visible retractions or increased work of breathing.    SKIN: Visible skin clear. No significant rash, abnormal pigmentation or lesions.  NEURO: Cranial nerves grossly intact.  Mentation and speech appropriate for age.  PSYCH: Mentation appears normal, affect normal/bright, judgement and insight intact, normal speech and appearance  well-groperla.            Video-Visit Details    Video Start Time: 11:31 AM    Type of service:  Video Visit    Video End Time:11:55 AM    Originating Location (pt. Location): Home    Distant Location (provider location):  On-site    Platform used for Video Visit: James

## 2022-11-17 NOTE — PROGRESS NOTES
GI CLINIC VISIT    CC/REFERRING MD:  Dr. Ursula Huang  REASON FOR CONSULTATION: nausea    ASSESSMENT/PLAN: 36 yo M with migraine, daily headache, anxiety, depression intolerance to selective serotonin reuptake inhibitor/SNRI who presents to GI clinic for nausea.    # Nausea episode  Started 6 months ago. Sudden on and resolution which is not typical for GI cause of nausea. No gallstone on prior US. With high dose NSAID use for headache, could also have peptic ulcers. Other possible cause is marijuana use (though rare use). We will also contact patient's neurology for possible neuro cause of nausea as well.     # Rectal bleeding, for a few year. More with wiping. No prior colonoscopy. Most likely hemorrhoid but could not rule out left side colon mass/polyp.     # Liver complex cyst vs hemangioma on US 6/2022  This would not explain his nausea episode.    Plan  -- Decrease ibuprofen (NSAIDS) use. Alternative includes acetaminophen.  -- Stop marijuana use  -- Will obtain upper endoscopy (for nausea) and colonoscopy (for rectal bleeding) with MAC. Patient will also contact his insurance for comparing the cost of MAC vs conscious sedation  -- Increase omeprazole to 40 once a day  -- We will contact your neurology for possible non-GI cause of nausea  -- Check for CBC    RTC: 3 months    Patient is seen and discussed with Dr. Mirian Diop.    45 minutes spent on the date of the encounter performing chart review, history and exam, documentation and further activities as noted above.  .  Sushant Padilla MD  GI fellow  Page [phone]      HPI 36 yo M with anxiety, depression intolerance to selective serotonin reuptake inhibitor/SNRI who presents to GI clinic for nausea episode.     Nausea started for 6 months ago. Occurs mostly in the late morning to late afternoon, daily; less frequent for the past 2-3 weeks. Lasts for 15-20 minutes each episode. No clear trigger as not related to positioning, food, or exertion. No  dizziness or lightheadedness during the episode. Then he sat down. Took Amirole, soda, cracker sometimes help. Self resolved. Afterward, no nausea at all until the next episode.    No abdominal pain. No acid refluxes or heartburn. No bloating. Has bowel movement once a day, no blood in the stool, but sometimes with wiping. Blood comes and goes for several years.     Is on omeprazole for 2 months, which helps with nausea.   H.pylori testing negative (10/21/22) in the setting of on PPI for a month.     - NSAID use: ibuprofen 600 mg every 3 days, when bad headache can be up to twice a day  - Cannabis use: rare  - Taking fiber gummies fiber once a day     Bought a house this year. No recent new stress for the past year.  Works for non-profit, operation director, [employer] work [employer]%.   Smoking: none  No alcohol - stopped right before pandemic 2020  Marijuana: using less than once a month. Started last year.   Exercise walking    Food allergy - fermented food (amine) -throat closing, headache            PREVIOUS ENDOSCOPY:  No prior EGD/colonoscopy.     ROS:    No fevers or chills  No weight loss -- past 2 years; couple year has gained weight with more sedentary lifestyle.   No blurry vision, double vision or change in vision  No sore throat  No lymphadenopathy  No headache, paraesthesias, or weakness in a limb  No shortness of breath or wheezing  No chest pain or pressure  No arthralgias or myalgias  No rashes or skin changes  No odynophagia or dysphagia  No BRBPR, hematochezia, melena  No dysuria, frequency or urgency  No hot/cold intolerance or polyria  No anxiety or depression    PROBLEM LIST  Patient Active Problem List    Diagnosis Date Noted     Reactive depression 09/20/2022     Priority: Medium     Morbid obesity (H) 06/13/2022     Priority: Medium     Weight gain 06/13/2022     Priority: Medium     Gastroesophageal reflux disease with esophagitis, unspecified whether hemorrhage 06/13/2022     Priority: Medium      "Nausea 2022     Priority: Medium     Chronic migraine without aura without status migrainosus, not intractable 2022     Priority: Medium     History of canker sores 2022     Priority: Medium     Chronic daily headache 2021     Priority: Medium     Migraine equivalent 2021     Priority: Medium     Moderate episode of recurrent major depressive disorder (H) 2019     Priority: Medium     Encounter for pharmacogenetic testing 2018     Priority: Medium     [employer] testing completed 18  CY: -163C>A - C/A, 5347C>T - C/T - Normal  CYP2B6 *1/*6 - intermediate  CGZ1N68 *1/*17 - normal  CYP2C9 *1/*3 - Intermediate  CY *1/*1 - normal  CYP2D6 *10/*41 - Poor  UGT1A4 *1/*1 - normal  HES5R27*2/*2 - normal  MTHFR - normal  SLC6A4 - L/S - intermediate  HTR2A - G/G - increased sensitivity  HLA-A*3101 and HLA-B*1502 - lower risk  COMT - MET/MET - reduced activity  ADRA2A - C/C - reduced response         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 continue counsleing       Anxiety 2013     Priority: Medium     Counseling is helping- medications ineffective  SSRI-lexapro, prozac- side effects,wellbutrin- caused anxiety attack,vybrid- \"weird: side effect  Saw-Pyschaitrist- 2017,Stopped effexor as well on own - not helping       Mixed hyperlipidemia 2013     Priority: Medium     Prehypertension 2013     Priority: Medium     CARDIOVASCULAR SCREENING; LDL GOAL LESS THAN 160 10/19/2012     Priority: Medium     Onychomycosis 2012     Priority: Medium       PERTINENT PAST MEDICAL HISTORY:  Past Medical History:   Diagnosis Date     Anxiety      Anxiety 2013     Anxiety attack 2013     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 7/10/2017     Hyperlipidemia LDL goal <160 " 11/1/2013     Intermittent asthma 9/29/2011    reports no active problems     Seasonal allergies 9/29/2011       PREVIOUS SURGERIES:  No past surgical history on file.    ALLERGIES:     Allergies   Allergen Reactions     Dust Mites Shortness Of Breath     Mold Shortness Of Breath     Cats Itching     Other Food Allergy Difficulty breathing, Headache and Visual Disturbance     Pcn [Penicillins]      Seasonal Allergies        PERTINENT MEDICATIONS:    Current Outpatient Medications:      allopurinol (ZYLOPRIM) 100 MG tablet, Take 1 tablet (100 mg) by mouth daily, Disp: 90 tablet, Rfl: 3     ALPRAZolam (XANAX) 1 MG tablet, Take 1 tablet (1 mg) by mouth once as needed for anxiety, Disp: 15 tablet, Rfl: 0     cetirizine (ZYRTEC) 10 MG tablet, Take 10 mg by mouth daily., Disp: , Rfl:      desvenlafaxine (PRISTIQ) 50 MG 24 hr tablet, Take 1 tablet (50 mg) by mouth daily, Disp: 90 tablet, Rfl: 3     fluocinonide (LIDEX) 0.05 % external gel, Apply topically 2 times daily, Disp: 30 g, Rfl: 1     IBUPROFEN PO, , Disp: , Rfl:      omeprazole (PRILOSEC) 20 MG DR capsule, Take 1 capsule (20 mg) by mouth daily, Disp: 90 capsule, Rfl: 3     order for DME, Equipment being ordered: digital Blood pressure apparatus, Disp: 1 Box, Rfl: 0     pantoprazole (PROTONIX) 40 MG EC tablet, Take 1 tablet (40 mg) by mouth daily, Disp: 30 tablet, Rfl: 11     VITAMIN D PO, Take 1,000 Units by mouth daily Pt takes 2 tabs daily in the AM, Disp: , Rfl:     Current Facility-Administered Medications:      Botulinum Toxin Type A (BOTOX) 200 units injection 150 Units, 150 Units, Intramuscular, See Admin Instructions, Sol Linares MD, 150 Units at 08/22/22 1306     Botulinum Toxin Type A (BOTOX) 200 units injection 155 Units, 155 Units, Intramuscular, See Admin Instructions, Sol Linares MD    SOCIAL HISTORY:  Social History     Socioeconomic History     Marital status: Single     Spouse name: Not on file     Number of children: Not on file      Years of education: Not on file     Highest education level: Not on file   Occupational History     Not on file   Tobacco Use     Smoking status: Never     Smokeless tobacco: Never   Substance and Sexual Activity     Alcohol use: Not Currently     Alcohol/week: 0.0 standard drinks     Comment: 2-3x week     Drug use: No     Sexual activity: Yes     Partners: Female     Birth control/protection: Pill   Other Topics Concern     Parent/sibling w/ CABG, MI or angioplasty before 65F 55M? No   Social History Narrative    Nonsmoker.     Social Determinants of Health     Financial Resource Strain: Not on file   Food Insecurity: Not on file   Transportation Needs: Not on file   Physical Activity: Not on file   Stress: Not on file   Social Connections: Not on file   Intimate Partner Violence: Not on file   Housing Stability: Not on file       FAMILY HISTORY:  No family history of colon cancer, stomach cancer, or esophageal cancer.  Family History   Problem Relation Age of Onset     Family History Negative Mother      Diabetes Mother         Gestational Diabetes 1969 (no late onset)     Hypertension Mother         Controlled by meds     Hyperlipidemia Mother         Controlled by meds     Other Cancer Mother         Basal cell skin cancers     Anesthesia Reaction Mother      Osteoporosis Mother         Osteopenia     Obesity Mother         Unsure if overweight or obese     Blood Disease Father 65        VTE, on coumadin     Hypertension Father         Controlled by meds     Hyperlipidemia Father         Controlled by meds     Other Cancer Father         Benign tumor on pituitary gland     Genetic Disorder Father         Mesenteric vein thrombosis - genetic. Test showed antithrombin 3 deficiency. Deep vein thrombosis, controlled by warfarin     Obesity Father         Unsure if overweight or obese     Diabetes Maternal Grandmother         Diabetes     Hypertension Maternal Grandmother      Breast Cancer Maternal Grandmother       Other Cancer Maternal Grandmother         Melanoma, Basal cell skin cancer     Genetic Disorder Maternal Grandmother         Parkinson's disease (don't know if genetic)     Coronary Artery Disease Maternal Grandfather         Angina, heart attack     Genetic Disorder Maternal Grandfather         Tata Gehrig's disease     Thyroid Disease Maternal Grandfather         Hypothyroidism     Coronary Artery Disease Paternal Grandfather         Bradycardia     Osteoporosis Paternal Grandfather      Genetic Disorder Paternal Grandfather         Blood clots - don't know if genetic     Hypertension Paternal Grandmother      Cerebrovascular Disease Paternal Grandmother      Osteoporosis Paternal Grandmother      Genetic Disorder Paternal Grandmother         Blood clot - don't know if genetic     Anxiety Disorder Sister      Obesity Sister        PHYSICAL EXAMINATION:  Constitutional: aaox3, cooperative, pleasant, not dyspneic/diaphoretic, no acute distress  Vitals reviewed: There were no vitals taken for this visit.  Wt:   Wt Readings from Last 2 Encounters:   06/13/22 121.1 kg (267 lb)   06/14/21 113.4 kg (250 lb)      Eyes: Sclera anicteric/injected  Ears/nose/mouth/throat: Normal oropharynx without ulcers or exudate, mucus membranes moist, hearing intact  Neck: supple, thyroid normal size  CV: No edema  Respiratory: Unlabored breathing  Lymph: No axillary, submandibular, supraclavicular or inguinal lymphadenopathy  Abd: Nondistended, nontender, no peritoneal signs  Skin: warm, perfused, no jaundice  Psych: Normal affect  MSK: Normal gait    PERTINENT STUDIES:  US Abdomen: 6/24/2022:   1. Hepatic steatosis.  2. Indeterminate hypoechoic subcentimeter focus in the left lobe of  liver, possibly a mildly complex cyst or hemangioma. Consider follow  up ultrasound if clinically indicated.  3. Unremarkable gallbladder.

## 2022-11-18 ASSESSMENT — HEADACHE IMPACT TEST (HIT 6)
WHEN YOU HAVE HEADACHES HOW OFTEN IS THE PAIN SEVERE: SOMETIMES
HOW OFTEN DID HEADACHS LIMIT CONCENTRATION ON WORK OR DAILY ACTIVITY: SOMETIMES
WHEN YOU HAVE A HEADACHE HOW OFTEN DO YOU WISH YOU COULD LIE DOWN: SOMETIMES
HOW OFTEN HAVE YOU FELT TOO TIRED TO WORK BECAUSE OF YOUR HEADACHES: RARELY
HIT6 TOTAL SCORE: 56
HOW OFTEN DO HEADACHES LIMIT YOUR DAILY ACTIVITIES: RARELY
HOW OFTEN HAVE YOU FELT FED UP OR IRRITATED BECAUSE OF YOUR HEADACHES: SOMETIMES

## 2022-11-21 ENCOUNTER — OFFICE VISIT (OUTPATIENT)
Dept: NEUROLOGY | Facility: CLINIC | Age: 37
End: 2022-11-21
Payer: COMMERCIAL

## 2022-11-21 DIAGNOSIS — G43.709 CHRONIC MIGRAINE WITHOUT AURA WITHOUT STATUS MIGRAINOSUS, NOT INTRACTABLE: Primary | ICD-10-CM

## 2022-11-21 PROCEDURE — 64615 CHEMODENERV MUSC MIGRAINE: CPT | Performed by: PSYCHIATRY & NEUROLOGY

## 2022-11-21 NOTE — PROGRESS NOTES
[facility]  Botulinum Toxin Procedure    Sol Linares MD  Headache Neurology    November 21, 2022    Procedure:  OnabotulinumtoxinA injections for chronic migraine  Indication:  Chronic migraine    Mr. Ellis suffers from severe intractable headaches.  He was referred by Dr. Linares for onabotulinumtoxinA injections for headache.  Risks, benefits, and alternatives were discussed.  All questions were answered and consent given.  He decided to proceed with the injections.      Prior to initiation of botulinum toxin injections, Mr. Ellis reported 20 headache days per month, with 20 severe headache days per month. His headaches are quite disabling and often interfere with his ability to function normally.    He reports 12-14 headache days per month, with 12-14 severe headache days per month.     He has attempted other migraine prophylactic treatments in the past, which have included: Emgality, desvenlafaxine, topiramate, amitriptyline, buspirone, venlafaxine, propranolol.       He currently takes desvenlafaxine for headache prevention.    Mr. Ellis's pain was assessed prior to the procedure.  He rated his pain today as 0 out of 10.    Procedural Pause: Procedural pause was conducted to verify correct patient identity, procedure to be performed, correct side and site, correct patient position, and special requirements. Appropriate hand hygiene was utilized, and each injection site was prepped with alcohol wipes or Chloraprep swab.     Procedure Details: 200 units of onabotulinumtoxinA was diluted in 4 mL 0.9% normal saline. A total of 150 units of onabotulinumtoxinA were injected using 30 gauge 0.5 in needles into the muscles listed below. 50 units of onabotulinumtoxinA were wasted.     Injection Sites: Total = 150 units onabotulinumtoxinA      and Procerus muscles - 5 units into the left and right corrugators and 5 units into the procerus (15 units total)    Frontalis muscles - 5 units into the left  superior frontalis and 5 units into the right superior frontalis (2 injection sites per muscle) (10 units total)    Temporalis muscles - 12.5 units into the left temporalis muscle and 12.5 units into the right temporalis muscle (2 injection sites per muscle) (25 units total)    Occipitalis muscles - 12.5 units into the left occipitalis muscle and 12.5 units into the right occipitalis muscle (2 injection sites per muscle) (25 units total)    Splenius Capitis muscles - 12.5 units into the left splenius capitis muscle and 12.5 units into the right splenius capitis muscle (2 injection sites per muscle, divided into 2/3 anteriorly and 1/3 posteriorly) (25 units total)      Trapezius muscles - 25 units into the left trapezius muscle and 25 units into the right trapezius muscle (3 injection sites per muscle, divided 5 units, 10 units, 10 units, medial to lateral) (50 units total)    Mr. Ellis tolerated the procedure well without immediate complications.  He will follow up in clinic for assessment of the effectiveness of treatment.  He did not report any change in his pain level after the botulinumtoxinA injection procedure.    Sol Linares MD  Headache Neurology  [facility]

## 2022-11-21 NOTE — LETTER
11/21/2022       RE: Eliezer Ellis  [address]  [facility] [address]     Dear Colleague,    Thank you for referring your patient, Eliezer Ellis, to the [facility] [place] [facility]. Please see a copy of my visit note below.    [facility]  Botulinum Toxin Procedure    Sol Linares MD  Headache Neurology    November 21, 2022    Procedure:  OnabotulinumtoxinA injections for chronic migraine  Indication:  Chronic migraine    Mr. Ellis suffers from severe intractable headaches.  He was referred by Dr. Linares for onabotulinumtoxinA injections for headache.  Risks, benefits, and alternatives were discussed.  All questions were answered and consent given.  He decided to proceed with the injections.      Prior to initiation of botulinum toxin injections, Mr. Ellis reported 20 headache days per month, with 20 severe headache days per month. His headaches are quite disabling and often interfere with his ability to function normally.    He reports 12-14 headache days per month, with 12-14 severe headache days per month.     He has attempted other migraine prophylactic treatments in the past, which have included: Emgality, desvenlafaxine, topiramate, amitriptyline, buspirone, venlafaxine, propranolol.       He currently takes desvenlafaxine for headache prevention.    Mr. Ellis's pain was assessed prior to the procedure.  He rated his pain today as 0 out of 10.    Procedural Pause: Procedural pause was conducted to verify correct patient identity, procedure to be performed, correct side and site, correct patient position, and special requirements. Appropriate hand hygiene was utilized, and each injection site was prepped with alcohol wipes or Chloraprep swab.     Procedure Details: 200 units of onabotulinumtoxinA was diluted in 4 mL 0.9% normal saline. A total of 150 units of onabotulinumtoxinA were injected using 30 gauge 0.5 in  needles into the muscles listed below. 50 units of onabotulinumtoxinA were wasted.     Injection Sites: Total = 150 units onabotulinumtoxinA      and Procerus muscles - 5 units into the left and right corrugators and 5 units into the procerus (15 units total)    Frontalis muscles - 5 units into the left superior frontalis and 5 units into the right superior frontalis (2 injection sites per muscle) (10 units total)    Temporalis muscles - 12.5 units into the left temporalis muscle and 12.5 units into the right temporalis muscle (2 injection sites per muscle) (25 units total)    Occipitalis muscles - 12.5 units into the left occipitalis muscle and 12.5 units into the right occipitalis muscle (2 injection sites per muscle) (25 units total)    Splenius Capitis muscles - 12.5 units into the left splenius capitis muscle and 12.5 units into the right splenius capitis muscle (2 injection sites per muscle, divided into 2/3 anteriorly and 1/3 posteriorly) (25 units total)      Trapezius muscles - 25 units into the left trapezius muscle and 25 units into the right trapezius muscle (3 injection sites per muscle, divided 5 units, 10 units, 10 units, medial to lateral) (50 units total)    Mr. Ellis tolerated the procedure well without immediate complications.  He will follow up in clinic for assessment of the effectiveness of treatment.  He did not report any change in his pain level after the botulinumtoxinA injection procedure.          Again, thank you for allowing me to participate in the care of your patient.      Sincerely,    Sol Linares MD

## 2022-11-22 ENCOUNTER — E-VISIT (OUTPATIENT)
Dept: URGENT CARE | Facility: CLINIC | Age: 37
End: 2022-11-22

## 2022-11-22 ENCOUNTER — PRE VISIT (OUTPATIENT)
Dept: GASTROENTEROLOGY | Facility: CLINIC | Age: 37
End: 2022-11-22

## 2022-11-22 ENCOUNTER — OFFICE VISIT (OUTPATIENT)
Dept: GASTROENTEROLOGY | Facility: CLINIC | Age: 37
End: 2022-11-22
Payer: COMMERCIAL

## 2022-11-22 VITALS
DIASTOLIC BLOOD PRESSURE: 91 MMHG | WEIGHT: 272.6 LBS | OXYGEN SATURATION: 96 % | HEIGHT: 72 IN | HEART RATE: 96 BPM | BODY MASS INDEX: 36.92 KG/M2 | SYSTOLIC BLOOD PRESSURE: 128 MMHG

## 2022-11-22 DIAGNOSIS — R05.9 COUGH, UNSPECIFIED TYPE: Primary | ICD-10-CM

## 2022-11-22 DIAGNOSIS — K76.89 LIVER CYST: Primary | ICD-10-CM

## 2022-11-22 DIAGNOSIS — R11.0 NAUSEA: ICD-10-CM

## 2022-11-22 PROCEDURE — 99421 OL DIG E/M SVC 5-10 MIN: CPT | Performed by: FAMILY MEDICINE

## 2022-11-22 PROCEDURE — 99204 OFFICE O/P NEW MOD 45 MIN: CPT | Mod: GC | Performed by: STUDENT IN AN ORGANIZED HEALTH CARE EDUCATION/TRAINING PROGRAM

## 2022-11-22 RX ORDER — ALBUTEROL SULFATE 90 UG/1
1-2 AEROSOL, METERED RESPIRATORY (INHALATION) EVERY 4 HOURS PRN
Qty: 18 G | Refills: 0 | Status: SHIPPED | OUTPATIENT
Start: 2022-11-22 | End: 2024-01-16

## 2022-11-22 ASSESSMENT — PAIN SCALES - GENERAL: PAINLEVEL: NO PAIN (0)

## 2022-11-22 NOTE — NURSING NOTE
"Chief Complaint   Patient presents with     New Patient       Vitals:    11/22/22 1619   BP: (!) 128/91   Pulse: 96   SpO2: 96%   Weight: 123.7 kg (272 lb 9.6 oz)   Height: 1.816 m (5' 11.5\")       Body mass index is 37.49 kg/m .    Saima Goldman MA    "

## 2022-11-22 NOTE — PATIENT INSTRUCTIONS
It was a pleasure taking care of you today.  I've included a brief summary of our discussion and care plan from today's visit below.  Please review this information with your primary care provider.  _______________________________________________________________________    My recommendations are summarized as follows:  -- Decrease ibuprofen (NSAIDS) use. Alternative includes acetaminophen.  -- Will obtain upper endoscopy (for nausea) and colonoscopy (for rectal bleeding)  -- Increase omeprazole to 40 once a day  -- We will contact your neurology  -- Will check for your blood count as well (CBC), please call to schedule    Return to GI Clinic in 3 months    _______________________________________________________________________    Who do I call with any questions after my visit?  Please be in touch if there are any further questions that arise following today's visit.  There are multiple ways to contact your gastroenterology care team.    During business hours, you may reach a Gastroenterology nurse at [phone] and choose option 3.     To schedule or reschedule an appointment, please call [phone].   You can always send a secure message through Wormhole.  Wormhole messages are answered by your nurse or doctor typically within 24 hours.  Please allow extra time on weekends and holidays.    For urgent/emergent questions after business hours, you may reach the on-call GI Fellow by contacting the [facility] at [phone].     How will I get the results of any tests ordered?    You will receive all of your results.  If you have signed up for W[employer]hole, any tests ordered at your visit will be available to you after your physician reviews them.  Typically this takes 1-2 weeks.  If there are urgent results that require a change in your care plan, your physician or nurse will call you to discuss the next steps.      What is Wormhole?  Wormhole is a secure way for you to access all of your healthcare  records from the [facility].  It is a web based computer program, so you can sign on to it from any location.  It also allows you to send secure messages to your care team.  I recommend signing up for [employer] access if you have not already done so and are comfortable with using a computer.      How do I schedule labs, imaging studies, or procedures that were ordered in clinic today?   - Labs: To schedule lab appointment at the [facility], use my chart or call [phone]. If you have a [place] lab closer to home where you are regularly seen you can give them a call.   - Procedures: If a colonoscopy, upper endoscopy, breath test, esophageal manometry, or pH impedence was ordered today, our endoscopy team will call you to schedule this. If you have not heard from our endoscopy team within a week, please call [phone] to schedule.   - Imaging Studies: If you were scheduled for a CT scan, X-ray, MRI, ultrasound, HIDA scan or other imaging study, please call [phone] to have this scheduled.   - Referral: If a referral to another specialty was ordered, expect a phone call or follow instructions above. If you have not heard from anyone regarding your referral in a week, please call our clinic to check the status.     Sincerely,    Sushant Padilla MD  GI Fellow  [facility], Division of Gastroenterology

## 2022-11-22 NOTE — LETTER
11/22/2022         RE: Eliezer Ellis  [address]  [facility] [address]        Dear Colleague,    Thank you for referring your patient, Eliezer Ellis, to the [facility] [place]. Please see a copy of my visit note below.    GI CLINIC VISIT    CC/REFERRING MD:  Dr. Ursula Huang  REASON FOR CONSULTATION: nausea    ASSESSMENT/PLAN: 38 yo M with migraine, daily headache, anxiety, depression intolerance to selective serotonin reuptake inhibitor/SNRI who presents to GI clinic for nausea.    # Nausea episode  Started 6 months ago. Sudden on and resolution which is not typical for GI cause of nausea. No gallstone on prior US. With high dose NSAID use for headache, could also have peptic ulcers. Other possible cause is marijuana use (though rare use). We will also contact patient's neurology for possible neuro cause of nausea as well.     # Rectal bleeding, for a few year. More with wiping. No prior colonoscopy. Most likely hemorrhoid but could not rule out left side colon mass/polyp.     # Liver complex cyst vs hemangioma on US 6/2022  This would not explain his nausea episode.    Plan  -- Decrease ibuprofen (NSAIDS) use. Alternative includes acetaminophen.  -- Stop marijuana use  -- Will obtain upper endoscopy (for nausea) and colonoscopy (for rectal bleeding) with MAC. Patient will also contact his insurance for comparing the cost of MAC vs conscious sedation  -- Increase omeprazole to 40 once a day  -- We will contact your neurology for possible non-GI cause of nausea  -- Check for CBC    RTC: 3 months    Patient is seen and discussed with Dr. Mirian Diop.    45 minutes spent on the date of the encounter performing chart review, history and exam, documentation and further activities as noted above.  .  Sushant Padilla MD  GI fellow  Page [phone]      HPI 38 yo M with anxiety, depression intolerance to selective serotonin reuptake inhibitor/SNRI who presents to GI  clinic for nausea episode.     Nausea started for 6 months ago. Occurs mostly in the late morning to late afternoon, daily; less frequent for the past 2-3 weeks. Lasts for 15-20 minutes each episode. No clear trigger as not related to positioning, food, or exertion. No dizziness or lightheadedness during the episode. Then he sat down. Took Amirole, soda, cracker sometimes help. Self resolved. Afterward, no nausea at all until the next episode.    No abdominal pain. No acid refluxes or heartburn. No bloating. Has bowel movement once a day, no blood in the stool, but sometimes with wiping. Blood comes and goes for several years.     Is on omeprazole for 2 months, which helps with nausea.   H.pylori testing negative (10/21/22) in the setting of on PPI for a month.     - NSAID use: ibuprofen 600 mg every 3 days, when bad headache can be up to twice a day  - Cannabis use: rare  - Taking fiber gummies fiber once a day     Bought a house this year. No recent new stress for the past year.  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   Smoking: none  No alcohol - stopped right before pandemic 2020  Marijuana: using less than once a month. Started last year.   Exercise walking    Food allergy - fermented food (amine) -throat closing, headache            PREVIOUS ENDOSCOPY:  No prior EGD/colonoscopy.     ROS:    No fevers or chills  No weight loss -- past 2 years; couple year has gained weight with more sedentary lifestyle.   No blurry vision, double vision or change in vision  No sore throat  No lymphadenopathy  No headache, paraesthesias, or weakness in a limb  No shortness of breath or wheezing  No chest pain or pressure  No arthralgias or myalgias  No rashes or skin changes  No odynophagia or dysphagia  No BRBPR, hematochezia, melena  No dysuria, frequency or urgency  No hot/cold intolerance or polyria  No anxiety or depression    PROBLEM LIST  Patient Active Problem List    Diagnosis Date Noted     Reactive  "depression 2022     Priority: Medium     Morbid obesity (H) 2022     Priority: Medium     Weight gain 2022     Priority: Medium     Gastroesophageal reflux disease with esophagitis, unspecified whether hemorrhage 2022     Priority: Medium     Nausea 2022     Priority: Medium     Chronic migraine without aura without status migrainosus, not intractable 2022     Priority: Medium     History of canker sores 2022     Priority: Medium     Chronic daily headache 2021     Priority: Medium     Migraine equivalent 2021     Priority: Medium     Moderate episode of recurrent major depressive disorder (H) 2019     Priority: Medium     Encounter for pharmacogenetic testing 2018     Priority: Medium     [employer] testing completed 18  CY: -163C>A - C/A, 5347C>T - C/T - Normal  CYP2B6 *1/*6 - intermediate  TIZ8R64 *1/*17 - normal  CYP2C9 *1/*3 - Intermediate  CY *1/*1 - normal  CYP2D6 *10/*41 - Poor  UGT1A4 *1/*1 - normal  BOJ0T61*2/*2 - normal  MTHFR - normal  SLC6A4 - L/S - intermediate  HTR2A - G/G - increased sensitivity  HLA-A*3101 and HLA-B*1502 - lower risk  COMT - MET/MET - reduced activity  ADRA2A - C/C - reduced response         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 continue counsleing       Anxiety 2013     Priority: Medium     Counseling is helping- medications ineffective  SSRI-lexapro, prozac- side effects,wellbutrin- caused anxiety attack,vybrid- \"weird: side effect  Saw-Pyschaitrist- 2017,Stopped effexor as well on own - not helping       Mixed hyperlipidemia 2013     Priority: Medium     Prehypertension 2013     Priority: Medium     CARDIOVASCULAR SCREENING; LDL GOAL LESS THAN 160 10/19/2012     Priority: Medium     Onychomycosis 2012     Priority: Medium "       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 7/10/2017     Hyperlipidemia LDL goal <160 11/1/2013     Intermittent asthma 9/29/2011    reports no active problems     Seasonal allergies 9/29/2011       PREVIOUS SURGERIES:  No past surgical history on file.    ALLERGIES:     Allergies   Allergen Reactions     Dust Mites Shortness Of Breath     Mold Shortness Of Breath     Cats Itching     Other Food Allergy Difficulty breathing, Headache and Visual Disturbance     Pcn [Penicillins]      Seasonal Allergies        PERTINENT MEDICATIONS:    Current Outpatient Medications:      allopurinol (ZYLOPRIM) 100 MG tablet, Take 1 tablet (100 mg) by mouth daily, Disp: 90 tablet, Rfl: 3     ALPRAZolam (XANAX) 1 MG tablet, Take 1 tablet (1 mg) by mouth once as needed for anxiety, Disp: 15 tablet, Rfl: 0     cetirizine (ZYRTEC) 10 MG tablet, Take 10 mg by mouth daily., Disp: , Rfl:      desvenlafaxine (PRISTIQ) 50 MG 24 hr tablet, Take 1 tablet (50 mg) by mouth daily, Disp: 90 tablet, Rfl: 3     fluocinonide (LIDEX) 0.05 % external gel, Apply topically 2 times daily, Disp: 30 g, Rfl: 1     IBUPROFEN PO, , Disp: , Rfl:      omeprazole (PRILOSEC) 20 MG DR capsule, Take 1 capsule (20 mg) by mouth daily, Disp: 90 capsule, Rfl: 3     order for DME, Equipment being ordered: digital Blood pressure apparatus, Disp: 1 Box, Rfl: 0     pantoprazole (PROTONIX) 40 MG EC tablet, Take 1 tablet (40 mg) by mouth daily, Disp: 30 tablet, Rfl: 11     VITAMIN D PO, Take 1,000 Units by mouth daily Pt takes 2 tabs daily in the AM, Disp: , Rfl:     Current Facility-Administered Medications:      Botulinum Toxin Type A (BOTOX) 200 units injection 150 Units, 150 Units, Intramuscular, See Admin Instructions, Sol Linares MD, 150 Units at 08/22/22 1306     Botulinum Toxin  Type A (BOTOX) 200 units injection 155 Units, 155 Units, Intramuscular, See Admin Instructions, Sol Linares MD    SOCIAL HISTORY:  Social History     Socioeconomic History     Marital status: Single     Spouse name: Not on file     Number of children: Not on file     Years of education: Not on file     Highest education level: Not on file   Occupational History     Not on file   Tobacco Use     Smoking status: Never     Smokeless tobacco: Never   Substance and Sexual Activity     Alcohol use: Not Currently     Alcohol/week: 0.0 standard drinks     Comment: 2-3x week     Drug use: No     Sexual activity: Yes     Partners: Female     Birth control/protection: Pill   Other Topics Concern     Parent/sibling w/ CABG, MI or angioplasty before 65F 55M? No   Social History Narrative    Nonsmoker.     Social Determinants of Health     Financial Resource Strain: Not on file   Food Insecurity: Not on file   Transportation Needs: Not on file   Physical Activity: Not on file   Stress: Not on file   Social Connections: Not on file   Intimate Partner Violence: Not on file   Housing Stability: Not on file       FAMILY HISTORY:  No family history of colon cancer, stomach cancer, or esophageal cancer.  Family History   Problem Relation Age of Onset     Family History Negative Mother      Diabetes Mother         Gestational Diabetes 1969 (no late onset)     Hypertension Mother         Controlled by meds     Hyperlipidemia Mother         Controlled by meds     Other Cancer Mother         Basal cell skin cancers     Anesthesia Reaction Mother      Osteoporosis Mother         Osteopenia     Obesity Mother         Unsure if overweight or obese     Blood Disease Father 65        VTE, on coumadin     Hypertension Father         Controlled by meds     Hyperlipidemia Father         Controlled by meds     Other Cancer Father         Benign tumor on pituitary gland     Genetic Disorder Father         Mesenteric vein thrombosis -  genetic. Test showed antithrombin 3 deficiency. Deep vein thrombosis, controlled by warfarin     Obesity Father         Unsure if overweight or obese     Diabetes Maternal Grandmother         Diabetes     Hypertension Maternal Grandmother      Breast Cancer Maternal Grandmother      Other Cancer Maternal Grandmother         Melanoma, Basal cell skin cancer     Genetic Disorder Maternal Grandmother         Parkinson's disease (don't know if genetic)     Coronary Artery Disease Maternal Grandfather         Angina, heart attack     Genetic Disorder Maternal Grandfather         Tata Gehrig's disease     Thyroid Disease Maternal Grandfather         Hypothyroidism     Coronary Artery Disease Paternal Grandfather         Bradycardia     Osteoporosis Paternal Grandfather      Genetic Disorder Paternal Grandfather         Blood clots - don't know if genetic     Hypertension Paternal Grandmother      Cerebrovascular Disease Paternal Grandmother      Osteoporosis Paternal Grandmother      Genetic Disorder Paternal Grandmother         Blood clot - don't know if genetic     Anxiety Disorder Sister      Obesity Sister        PHYSICAL EXAMINATION:  Constitutional: aaox3, cooperative, pleasant, not dyspneic/diaphoretic, no acute distress  Vitals reviewed: There were no vitals taken for this visit.  Wt:   Wt Readings from Last 2 Encounters:   06/13/22 121.1 kg (267 lb)   06/14/21 113.4 kg (250 lb)      Eyes: Sclera anicteric/injected  Ears/nose/mouth/throat: Normal oropharynx without ulcers or exudate, mucus membranes moist, hearing intact  Neck: supple, thyroid normal size  CV: No edema  Respiratory: Unlabored breathing  Lymph: No axillary, submandibular, supraclavicular or inguinal lymphadenopathy  Abd: Nondistended, nontender, no peritoneal signs  Skin: warm, perfused, no jaundice  Psych: Normal affect  MSK: Normal gait    PERTINENT STUDIES:  US Abdomen: 6/24/2022:   1. Hepatic steatosis.  2. Indeterminate hypoechoic subcentimeter  focus in the left lobe of  liver, possibly a mildly complex cyst or hemangioma. Consider follow  up ultrasound if clinically indicated.  3. Unremarkable gallbladder.        Sincerely,    Sushant Padilla MD

## 2022-11-23 NOTE — PATIENT INSTRUCTIONS
Dear Eliezer Ellis    I think it is reasonable to use an inhaler for your cough. I sent this in. But if the symptoms are worsening or not improving then you should be seen so we can listen to your lungs.     Thanks for choosing us as your health care partner,    Jl Mary MD

## 2022-11-25 ENCOUNTER — TELEPHONE (OUTPATIENT)
Dept: GASTROENTEROLOGY | Facility: CLINIC | Age: 37
End: 2022-11-25

## 2022-11-25 NOTE — TELEPHONE ENCOUNTER
[facility]    Schedule a follow up appt with Dr. Padilla in 3 months (around 2/22/23)   Consent: Written consent obtained. Risks include but not limited to lid/brow ptosis, bruising, swelling, diplopia, temporary effect, incomplete chemical denervation.

## 2022-11-29 ENCOUNTER — TELEPHONE (OUTPATIENT)
Dept: GASTROENTEROLOGY | Facility: CLINIC | Age: 37
End: 2022-11-29

## 2022-11-29 NOTE — TELEPHONE ENCOUNTER
Screening Questions  BLUE  KIND OF PREP RED  LOCATION [review exclusion criteria] GREEN  SEDATION TYPE        Y Are you active on mychart?       VALERIE OVIEDO Ordering/Referring Provider?        MEDICA What type of coverage do you have?      N Have you had a positive covid test in the last 14 days?     35.9 1. BMI  [BMI 40+ - review exclusion criteria]    Y  2. Are you able to give consent for your medical care? [IF NO,RN REVIEW]        N  3. Are you taking any prescription pain medications on a routine schedule?        3a. EXTENDED PREP What kind of prescription?     N 4. Do you have any chemical dependencies such as alcohol, street drugs, or methadone?    Y 5. Do you have any history of post-traumatic stress syndrome, severe anxiety or history of psychosis?      **If yes 3- 5 , please schedule with MAC sedation.**          IF YES TO ANY 6 - 10 - HOSPITAL SETTING ONLY.     N 6.   Do you need assistance transferring?     N 7.   Have you had a heart or lung transplant?    N 8.   Are you currently on dialysis?   N 9.   Do you use daily home oxygen?   N 10. Do you take nitroglycerin?   10a.  If yes, how often?     11. [FEMALES]   Are you currently pregnant?    11a.  If yes, how many weeks? [ Greater than 12 weeks, OR NEEDED]    N 12. Do you have Pulmonary Hypertension? *NEED PAC APPT AT [facility]*     N 13. [review exclusion criteria]  Do you have any implantable devices in your body (pacemaker, defib, LVAD)?    N 14. In the past 6 months, have you had any heart related issues including cardiomyopathy or heart attack?     14a.  If yes, did it require cardiac stenting if so when?     N 15. Have you had a stroke or Transient ischemic attack (TIA - aka  mini stroke ) within 6 months?      N 16. Do you have mod to severe Obstructive Sleep Apnea?  [Hospital only - Ok at [facility]]    N 17. Do you have SEVERE AND UNCONTROLLED asthma? *NEED PAC APPT AT [facility]*     N 18. Are you currently taking any blood thinners?     " 18a. If yes, inform patient to \"follow up w/ ordering provider for bridging instructions.\"    N 19. Do you take the medication Phentermine?    19a. If yes, \"Hold for 7 days before procedure.  Please consult your prescribing provider if you have questions about holding this medication.\"     N  20. Do you have chronic kidney disease?      N  21. Do you have a diagnosis of diabetes?     N  22. On a regular basis do you go 3-5 days between bowel movements?      23. Preferred LOCAL Pharmacy for Pre Prescription    [ LIST ONLY ONE PHARMACY]        [id number] - [place], [place] - [address] AT [facility] OF [address]      - CLOSING REMINDERS -    Informed patient they will need an adult    Cannot take any type of public or medical transportation alone    Conscious Sedation- Needs  for 6 hours after the procedure       MAC/General-Needs  for 24 hours after procedure    Pre-Procedure Covid test to be completed [[facility] PCR Testing Required]    Confirmed Nurse will call to complete assessment       - SCHEDULING DETAILS -     BLAYNE  Surgeon    2/17/2023  Date of Procedure  Upper and Lower Endoscopy [EGD and Colonoscopy]  Type of Procedure Scheduled  [facility]-If you answer yes to questions #8, #20, #21Which Colonoscopy Prep was Sent?     MAC Sedation Type     NO PAC / Pre-op Required         Additional comments:  Patient mentioned that his father had an adverse reaction to sedation when he had his colonoscopy. Patient is reaching out to his father to find out what the medication was and would like to discuss in case he may have issues as well.         "

## 2022-12-01 ENCOUNTER — MYC MEDICAL ADVICE (OUTPATIENT)
Dept: GASTROENTEROLOGY | Facility: CLINIC | Age: 37
End: 2022-12-01

## 2022-12-01 DIAGNOSIS — K21.00 GASTROESOPHAGEAL REFLUX DISEASE WITH ESOPHAGITIS, UNSPECIFIED WHETHER HEMORRHAGE: Primary | ICD-10-CM

## 2022-12-01 RX ORDER — OMEPRAZOLE 40 MG/1
40 CAPSULE, DELAYED RELEASE ORAL DAILY
Qty: 90 CAPSULE | Refills: 3 | Status: SHIPPED | OUTPATIENT
Start: 2022-12-01 | End: 2022-12-09

## 2022-12-01 NOTE — TELEPHONE ENCOUNTER
-- Order placed for 40mg omeprazole daily. Discontinued order for 20mg capsules.  ______________________________________________  Per clinic visit with Dr. Padilla 11/22/22:

## 2022-12-02 ENCOUNTER — THERAPY VISIT (OUTPATIENT)
Dept: PHYSICAL THERAPY | Facility: CLINIC | Age: 37
End: 2022-12-02
Attending: FAMILY MEDICINE
Payer: COMMERCIAL

## 2022-12-02 DIAGNOSIS — S29.011A MUSCLE STRAIN OF ANTERIOR CHEST WALL: ICD-10-CM

## 2022-12-02 DIAGNOSIS — R07.81 RIB PAIN ON RIGHT SIDE: ICD-10-CM

## 2022-12-02 PROCEDURE — 97110 THERAPEUTIC EXERCISES: CPT | Mod: GP | Performed by: PHYSICAL THERAPIST

## 2022-12-02 PROCEDURE — 97161 PT EVAL LOW COMPLEX 20 MIN: CPT | Mod: GP | Performed by: PHYSICAL THERAPIST

## 2022-12-02 NOTE — PROGRESS NOTES
Physical Therapy Initial Evaluation  Subjective:  Lifting and coughing aggravate symptoms.  Bending and twisting can cause a stitch/catch (which is momentary).     The history is provided by the patient. No  was used.   Patient Health History  Eliezer Ellis being seen for Pain in muscles around lower right ribs.     Problem began: 6/11/2021.   Problem occurred: Tripped and fell on the sidewalk   Pain is reported as 0/10 on pain scale.  General health as reported by patient is fair.  Pertinent medical history includes: asthma, high blood pressure, mental illness, migraines/headaches and overweight.     Medical allergies: other. Other medical allergies details: Penicillin.   Surgeries include:  None.    Current medications:  Anti-depressants.    Current occupation is Operations & .   Primary job tasks include:  Computer work and prolonged sitting.                  Therapist Generated HPI Evaluation         Type of problem:  Thoracic.    This is a chronic condition.  Condition occurred with:  A fall/slip.  Where condition occurred: in the community.  Patient reports pain:  Other.  Pain is described as other and is intermittent.    Since onset symptoms are unchanged.  Associated symptoms:  Loss of motion/stiffness. Symptoms are exacerbated by bending, lifting and twisting  and relieved by rest.      Restrictions due to condition include:  Working in normal job without restrictions.  Barriers include:  None as reported by patient.                        Objective:  System              Cervical/Thoracic Evaluation              Cervical Palpation:  normal                                                      Daniele Cervical Evaluation      Movement Loss:  Protrusion (PRO): nil  Flexion (Flex): nil  Retraction (RET): min  Extension (EXT): nil  Lateral Flexion Right (LF R): nil  Lateral Flexion Left (LF L): nil  Rotation Right (ROT R): nil  Rotation Left (ROT L): nil           Daniele Lumbar Evaluation      Movement Loss:  Flexion (Flex): nil  Extension (EXT): min  Side Glide R (SG R): nil  Side [place] L (SG L): nil                             Daniele Thoracic Evalution:  Posture:  Sitting: fair  Standing: good  Protruded Head: no  Kyphosis: normal      Movement Loss:  Flexion (Flex):  Nil  Extension (EXT): mod and pain  Rotation Lt (ROT L): pain and min  Rotation Rt (ROT R): mod    Test Movements:        Rep Extension:  During:  No effect  After: no effect  Mechanical Response: no effect                  Rep ROT Rt: During:  Increases  After: better  Mechanical Response: IncROM    Rep ROT Lt: During:  No effect  After: no effect  Mechanical Response: no effect    Conclusion: derangement  Principle of Treatment:          Other:  Will trial right rotation                  ROS    Assessment/Plan:    Patient is a 37 year old male with thoracic complaints.    Patient has the following significant findings with corresponding treatment plan.                Diagnosis 1:  Right rib pain  Pain -  manual therapy, self management, education, directional preference exercise and home program  Decreased ROM/flexibility - manual therapy, therapeutic exercise and home program  Impaired posture - neuro re-education and home program    Therapy Evaluation Codes:   1) History comprised of:   Personal factors that impact the plan of care:      None.    Comorbidity factors that impact the plan of care are:      Asthma, High blood pressure, Mental illness, Migraines/headaches and Overweight.     Medications impacting care: Anti-depressant.  2) Examination of Body Systems comprised of:   Body structures and functions that impact the plan of care:      Thoracic Spine.   Activity limitations that impact the plan of care are:      Bending, Dressing, Lifting, Squatting/kneeling and reaching.  3) Clinical presentation characteristics are:   Stable/Uncomplicated.  4) Decision-Making    Low complexity using  standardized patient assessment instrument and/or measureable assessment of functional outcome.  Cumulative Therapy Evaluation is: Low complexity.    Previous and current functional limitations:  (See Goal Flow Sheet for this information)    Short term and Long term goals: (See Goal Flow Sheet for this information)     Communication ability:  Patient appears to be able to clearly communicate and understand verbal and written communication and follow directions correctly.  Treatment Explanation - The following has been discussed with the patient:   RX ordered/plan of care  Anticipated outcomes  Possible risks and side effects  This patient would benefit from PT intervention to resume normal activities.   Rehab potential is good.    Frequency:  2 X a month, once daily  Duration:  for 3 months  Discharge Plan:  Achieve all LTG.  Independent in home treatment program.  Reach maximal therapeutic benefit.    Please refer to the daily flowsheet for treatment today, total treatment time and time spent performing 1:1 timed codes.

## 2022-12-08 ENCOUNTER — TELEPHONE (OUTPATIENT)
Dept: GASTROENTEROLOGY | Facility: CLINIC | Age: 37
End: 2022-12-08

## 2022-12-08 DIAGNOSIS — K21.00 GASTROESOPHAGEAL REFLUX DISEASE WITH ESOPHAGITIS, UNSPECIFIED WHETHER HEMORRHAGE: ICD-10-CM

## 2022-12-08 NOTE — TELEPHONE ENCOUNTER
PRIOR AUTHORIZATION DENIED    Medication: omeprazole (PRILOSEC) 40 MG DR capsule--DENIED    Denial Date: 12/8/2022    Denial Rational: Excluded.  All PPI's are not covered for patient's age

## 2022-12-08 NOTE — TELEPHONE ENCOUNTER
Prior Authorization Retail Medication Request    Medication/Dose: omeprazole (PRILOSEC) 40mg    ICD code (if different than what is on RX):    Previously Tried and Failed:  Omeprazole (PRILOSEC) 20mg  Rationale:  Uncontrolled symptoms on lower dose     Insurance Name:    Insurance ID:        Pharmacy Information (if different than what is on RX)  Name:    Phone:

## 2022-12-09 RX ORDER — OMEPRAZOLE 40 MG/1
40 CAPSULE, DELAYED RELEASE ORAL DAILY
Qty: 90 CAPSULE | Refills: 3 | Status: SHIPPED | OUTPATIENT
Start: 2022-12-09 | End: 2022-12-12

## 2022-12-09 NOTE — TELEPHONE ENCOUNTER
Sent prescription to [facility] pharmacy. 3 month supply will cost the patient about $12 (including shipping). Called the patient to explain the change in pharmacy due to insurance coverage denial; no answer, left voicemail with information.

## 2022-12-12 RX ORDER — OMEPRAZOLE 40 MG/1
40 CAPSULE, DELAYED RELEASE ORAL DAILY
Qty: 90 CAPSULE | Refills: 3 | Status: SHIPPED | OUTPATIENT
Start: 2022-12-12 | End: 2023-08-21

## 2022-12-12 NOTE — TELEPHONE ENCOUNTER
Changed pharmacy back to [facility], per patient request. He states there is an option to pay out-of-pocket that would only cost about $10. He will contact me if there are further issues.

## 2022-12-12 NOTE — PROGRESS NOTES
[facility]    OUTPATIENT Physical Therapy ORTHOPEDIC EVALUATION  PLAN OF TREATMENT FOR OUTPATIENT REHABILITATION  (COMPLETE FOR INITIAL CLAIMS ONLY)  Patient's Last Name, First Name, M.I.  YOB: 1985  Eliezer Ellis    Provider s Name:  [facility]   Medical Record No.  [id number]   Start of Care Date:      Onset Date:   06/11/21   Treatment Diagnosis:  right rib pain Medical Diagnosis:     Muscle strain of anterior chest wall  Rib pain on right side       Goals:     12/02/22 0500   Body Part   Goals listed below are for rib pain   Goal #1   Goal #1 reaching   Previous Functional Level behind back   Performance level no pain   Current Functional Level Can reach ;behind back   Performance level pain   STG Target Performance Reach ;behind back   Performance level pain 1/10 at worst   Rationale for dressing   Due date 01/13/23   LTG Target Performance Reach;behind back   Performance Level no pain   Rationale for dressing   Due date 02/24/23       Therapy Frequency:     Predicted Duration of Therapy Intervention:       [place] Razo, PT                 I CERTIFY THE NEED FOR THESE SERVICES FURNISHED UNDER        THIS PLAN OF TREATMENT AND WHILE UNDER MY CARE     (Physician attestation of this document indicates review and certification of the therapy plan).                     Certification Date From:      Certification Date To:       Referring Provider:  Ursula Huang    Initial Assessment        See Epic Evaluation

## 2023-01-25 ENCOUNTER — TELEPHONE (OUTPATIENT)
Dept: ENDOCRINOLOGY | Facility: CLINIC | Age: 38
End: 2023-01-25
Payer: COMMERCIAL

## 2023-01-26 ENCOUNTER — VIRTUAL VISIT (OUTPATIENT)
Dept: ENDOCRINOLOGY | Facility: CLINIC | Age: 38
End: 2023-01-26
Attending: FAMILY MEDICINE
Payer: COMMERCIAL

## 2023-01-26 ENCOUNTER — PRE VISIT (OUTPATIENT)
Dept: ENDOCRINOLOGY | Facility: CLINIC | Age: 38
End: 2023-01-26

## 2023-01-26 DIAGNOSIS — E78.5 DYSLIPIDEMIA: ICD-10-CM

## 2023-01-26 DIAGNOSIS — K76.0 HEPATIC STEATOSIS: ICD-10-CM

## 2023-01-26 DIAGNOSIS — M10.9 GOUT, UNSPECIFIED CAUSE, UNSPECIFIED CHRONICITY, UNSPECIFIED SITE: ICD-10-CM

## 2023-01-26 DIAGNOSIS — E03.8 SUBCLINICAL HYPOTHYROIDISM: Primary | ICD-10-CM

## 2023-01-26 DIAGNOSIS — E66.812 CLASS 2 SEVERE OBESITY DUE TO EXCESS CALORIES WITH SERIOUS COMORBIDITY AND BODY MASS INDEX (BMI) OF 35.0 TO 35.9 IN ADULT (H): ICD-10-CM

## 2023-01-26 DIAGNOSIS — R63.5 WEIGHT GAIN: ICD-10-CM

## 2023-01-26 DIAGNOSIS — E66.01 CLASS 2 SEVERE OBESITY DUE TO EXCESS CALORIES WITH SERIOUS COMORBIDITY AND BODY MASS INDEX (BMI) OF 35.0 TO 35.9 IN ADULT (H): ICD-10-CM

## 2023-01-26 DIAGNOSIS — R53.83 OTHER FATIGUE: ICD-10-CM

## 2023-01-26 PROCEDURE — 99204 OFFICE O/P NEW MOD 45 MIN: CPT | Mod: 95 | Performed by: INTERNAL MEDICINE

## 2023-01-26 ASSESSMENT — ENCOUNTER SYMPTOMS
CONSTIPATION: 0
DEPRESSION: 0
NERVOUS/ANXIOUS: 1
PANIC: 0
NUMBNESS: 0
DIZZINESS: 0
SPEECH CHANGE: 0
HEADACHES: 1
PARALYSIS: 0
RECTAL PAIN: 0
SEIZURES: 0
BLOOD IN STOOL: 0
LOSS OF CONSCIOUSNESS: 0
BLOATING: 0
VOMITING: 0
DIARRHEA: 0
INSOMNIA: 1
BOWEL INCONTINENCE: 0
WEAKNESS: 0
NAUSEA: 1
HEARTBURN: 0
DECREASED CONCENTRATION: 0
DISTURBANCES IN COORDINATION: 0
TREMORS: 0
JAUNDICE: 0
MEMORY LOSS: 0
ABDOMINAL PAIN: 0
TINGLING: 0

## 2023-01-26 NOTE — LETTER
"1/26/2023       RE: Eliezer Ellis  [address]  [facility] [address]     Dear Colleague,    Thank you for referring your patient, Eliezer Ellis, to the [facility] [place] [facility]. Please see a copy of my visit note below.    Video visit    Start time 10:12, stop time 10:38; additional 10 minutes spent on the date of the encounter doing chart review, documentation and further activities as noted.     Provider location: [facility], [address] [place] [id number]    Patient location: patient home    Mode of transmission: video     Subjective:    Eliezer Ellis is a 38 year old male who presents to review thyroid function.        2016: TPO Ab undetectable    He was on LT4 (it appears 25 - 50 mcg daily) for a period around 5/2021 to spring of 2022.     We suspect 2016 was the first time his TFTs were checked.    No FH of thyroid pathology. No FH of thyroid cancer. No known thyroid nodule. No prior thyroid US. No prior thyroid/neck surgery. No prior H/N radiation. No prior DALY therapy.     No use of amiodarone. No use of lithium. No use of biotin. No use of iodine supplements.     He has noticed fatigue. Currently 6'0\" and 260 # and his weight has increased slightly over the past few years. No prior weight loss surgeries/procedures. No prior use of weight loss medications. He has had ongoing nausea and has upcoming planned EGD to investigate.     Comorbidities: obesity, hepatic steatosis, gout, dyslipidemia. HbA1c 4.8% 6/2022. Suspected sleep apnea - he was previously referred to sleep medicine and has an upcoming appointment.     Objective:    Virtual visit, appears well, BMI 35.3 kg/m2    Assessment/Plan:    # Subclinical hypothyroidism    We reviewed the natural history and pathophysiology of subclinical hypothyroidism.  At this time we will obtain baseline labs and plan to initiate levothyroxine " afterwards.  We did review how to take levothyroxine to maximize absorption.  I will touch base with Eliezer via [employer] when labs are back.  No follow-up appointment scheduled.  Once we have him on levothyroxine and TSH normalizes we can turn his thyroid care back over to his primary physician.    # Medically complicated obesity    BMI 35.3 kg/m2 with multiple weight related comorbidities. He is interested in referral to the weight management group and I placed a referral.       Again, thank you for allowing me to participate in the care of your patient.      Sincerely,    Cliff Peña MD

## 2023-01-26 NOTE — TELEPHONE ENCOUNTER
Final attempt: LVM notifying pt that we had switched appt to be a video visit. Left c/b number. Asked that if pt is not able to do a video visit he call so we can reschedule. MAXIMUM NUMBER OF ATTEMPTS REACHED.

## 2023-01-26 NOTE — TELEPHONE ENCOUNTER
RECORDS RECEIVED FROM: internal    DATE RECEIVED: 1.16.23    NOTES (FOR ALL VISITS) STATUS DETAILS   OFFICE NOTES from referring provider internal  Dr Huang     MEDICATION LIST internal       LABS     DIABETES: HBGA1C, CREATININE, FASTING LIPIDS, MICROALBUMIN URINE, POTASSIUM, TSH, T4    THYROID: TSH, T4, CBC, THYRODLONULIN, TOTAL T3, FREE T4, CALCITONIN, CEA internal  TSH-6.13.22   T4- 6.13.22   Lipid-6.13.22   BMP-6.13.22   HVFB1H-5.13.22

## 2023-01-26 NOTE — PROGRESS NOTES
"Video visit    Start time 10:12, stop time 10:38; additional 10 minutes spent on the date of the encounter doing chart review, documentation and further activities as noted.     Provider location: [facility], [address] [place] [address]    Patient location: patient home    Mode of transmission: video     Subjective:    Eliezer Ellis is a 38 year old male who presents to review thyroid function.        2016: TPO Ab undetectable    He was on LT4 (it appears 25 - 50 mcg daily) for a period around 5/2021 to spring of 2022.     We suspect 2016 was the first time his TFTs were checked.    No FH of thyroid pathology. No FH of thyroid cancer. No known thyroid nodule. No prior thyroid US. No prior thyroid/neck surgery. No prior H/N radiation. No prior DALY therapy.     No use of amiodarone. No use of lithium. No use of biotin. No use of iodine supplements.     He has noticed fatigue. Currently 6'0\" and 260 # and his weight has increased slightly over the past few years. No prior weight loss surgeries/procedures. No prior use of weight loss medications. He has had ongoing nausea and has upcoming planned EGD to investigate.     Comorbidities: obesity, hepatic steatosis, gout, dyslipidemia. HbA1c 4.8% 6/2022. Suspected sleep apnea - he was previously referred to sleep medicine and has an upcoming appointment.     Objective:    Virtual visit, appears well, BMI 35.3 kg/m2    Assessment/Plan:    # Subclinical hypothyroidism    We reviewed the natural history and pathophysiology of subclinical hypothyroidism.  At this time we will obtain baseline labs and plan to initiate levothyroxine afterwards.  We did review how to take levothyroxine to maximize absorption.  I will touch base with Eliezer via [employer] when labs are back.  No follow-up appointment scheduled.  Once we have him on levothyroxine and TSH normalizes we can turn his thyroid care back over to his primary physician.    # Medically complicated " obesity    BMI 35.3 kg/m2 with multiple weight related comorbidities. He is interested in referral to the weight management group and I placed a referral.

## 2023-02-07 ENCOUNTER — TELEPHONE (OUTPATIENT)
Dept: GASTROENTEROLOGY | Facility: CLINIC | Age: 38
End: 2023-02-07
Payer: COMMERCIAL

## 2023-02-07 DIAGNOSIS — R11.0 NAUSEA: Primary | ICD-10-CM

## 2023-02-07 RX ORDER — BISACODYL 5 MG/1
TABLET, DELAYED RELEASE ORAL
Qty: 4 TABLET | Refills: 0 | Status: SHIPPED | OUTPATIENT
Start: 2023-02-07 | End: 2023-03-17

## 2023-02-07 NOTE — TELEPHONE ENCOUNTER
Attempted to contact patient regarding upcoming Colonoscopy/Upper endoscopy (EGD) procedure on 2.17.23 for pre assessment questions. No answer.     Left message to return call to [phone] #4      Arlette Adames RN  Endoscopy Procedure Pre Assessment RN

## 2023-02-07 NOTE — TELEPHONE ENCOUNTER
Patient scheduled for Colonoscopy/Upper endoscopy (EGD) on 2.17.23.     Discuss Covid policy.     Pre op exam scheduled: N/A    Arrival time: 0930. Procedure time 1030    [facility]; [address] [place] [address], [address] Floor, [place] [address]    Sedation type: MAC    Anticoagulations? No    Electronic implanted devices? No    Diabetic? No    Indication for procedure: nausea    Bowel prep recommendation: Standard Golytely     Prep instructions sent via [employer] Bowel prep script sent to    [id number] - [place], [place] - [address] AT [facility]    Pre visit planning completed.    Arlette Adames RN  Endoscopy Procedure Pre Assessment RN

## 2023-02-08 ENCOUNTER — MYC MEDICAL ADVICE (OUTPATIENT)
Dept: GASTROENTEROLOGY | Facility: CLINIC | Age: 38
End: 2023-02-08
Payer: COMMERCIAL

## 2023-02-08 NOTE — TELEPHONE ENCOUNTER
Pre assessment questions completed for upcoming Colonoscopy/Upper endoscopy (EGD) procedure scheduled on 2/17/23    COVID policy reviewed.     Reviewed procedural arrival time 0930, procedure time 1030 and facility location [facility]; [address] [place] [address], [address] Floor, [place] [address]    Designated  policy reviewed. Instructed to have someone stay 24 hours post procedure.     Anticoagulation/blood thinners? No    Electronic implanted devices? No    Reviewed procedure prep instructions.     Patient verbalized understanding and had no questions or concerns at this time.    Oralia Hutchison RN  Endoscopy Procedure Pre Assessment RN

## 2023-02-09 ENCOUNTER — PATIENT OUTREACH (OUTPATIENT)
Dept: GASTROENTEROLOGY | Facility: CLINIC | Age: 38
End: 2023-02-09
Payer: COMMERCIAL

## 2023-02-09 NOTE — TELEPHONE ENCOUNTER
Mother sent a my chart  note about concerns patient's colonoscopy and a reaction patient's father had during his endoscopic procedure      Hi! Eliezer spoke with someone today about prep for the colonoscopy/upper endoscopy that he has next week and mentioned that his father has previously had a bad reaction to sedation for a colonoscopy. The person suggested he relay this info to the anesthesiologist in case it impacts anything, but we wanted to send a note now, in case we forget to mention it on the day of the appointment.     During one of his father's colonoscopies (we think with 'twilight sedation'??), his father essentially found himself conscious and feeling everything. He thinks that he was given midazolam 9mg IV and meperidine 150mg IV that time, but is trying to confirm. I'll send a follow-up note if we learn more

## 2023-02-17 ENCOUNTER — HOSPITAL ENCOUNTER (OUTPATIENT)
Facility: AMBULATORY SURGERY CENTER | Age: 38
Discharge: HOME | End: 2023-02-17
Attending: INTERNAL MEDICINE
Payer: COMMERCIAL

## 2023-02-17 ENCOUNTER — ANESTHESIA EVENT (OUTPATIENT)
Dept: SURGERY | Facility: AMBULATORY SURGERY CENTER | Age: 38
End: 2023-02-17
Payer: COMMERCIAL

## 2023-02-17 ENCOUNTER — ANESTHESIA (OUTPATIENT)
Dept: SURGERY | Facility: AMBULATORY SURGERY CENTER | Age: 38
End: 2023-02-17
Payer: COMMERCIAL

## 2023-02-17 VITALS
HEIGHT: 72 IN | WEIGHT: 265 LBS | OXYGEN SATURATION: 96 % | RESPIRATION RATE: 16 BRPM | HEART RATE: 90 BPM | TEMPERATURE: 97 F | SYSTOLIC BLOOD PRESSURE: 115 MMHG | DIASTOLIC BLOOD PRESSURE: 75 MMHG | BODY MASS INDEX: 35.89 KG/M2

## 2023-02-17 VITALS — HEART RATE: 89 BPM

## 2023-02-17 LAB
COLONOSCOPY: NORMAL
UPPER GI ENDOSCOPY: NORMAL

## 2023-02-17 PROCEDURE — 88305 TISSUE EXAM BY PATHOLOGIST: CPT | Mod: 26 | Performed by: PATHOLOGY

## 2023-02-17 PROCEDURE — 45378 DIAGNOSTIC COLONOSCOPY: CPT

## 2023-02-17 PROCEDURE — 43239 EGD BIOPSY SINGLE/MULTIPLE: CPT

## 2023-02-17 PROCEDURE — 88305 TISSUE EXAM BY PATHOLOGIST: CPT | Mod: TC | Performed by: INTERNAL MEDICINE

## 2023-02-17 PROCEDURE — 88342 IMHCHEM/IMCYTCHM 1ST ANTB: CPT | Mod: 26 | Performed by: PATHOLOGY

## 2023-02-17 RX ORDER — ONDANSETRON 2 MG/ML
4 INJECTION INTRAMUSCULAR; INTRAVENOUS EVERY 6 HOURS PRN
Status: DISCONTINUED | OUTPATIENT
Start: 2023-02-17 | End: 2023-02-18 | Stop reason: HOSPADM

## 2023-02-17 RX ORDER — SODIUM CHLORIDE, SODIUM LACTATE, POTASSIUM CHLORIDE, CALCIUM CHLORIDE 600; 310; 30; 20 MG/100ML; MG/100ML; MG/100ML; MG/100ML
INJECTION, SOLUTION INTRAVENOUS CONTINUOUS PRN
Status: DISCONTINUED | OUTPATIENT
Start: 2023-02-17 | End: 2023-02-17

## 2023-02-17 RX ORDER — PROPOFOL 10 MG/ML
INJECTION, EMULSION INTRAVENOUS PRN
Status: DISCONTINUED | OUTPATIENT
Start: 2023-02-17 | End: 2023-02-17

## 2023-02-17 RX ORDER — ONDANSETRON 4 MG/1
4 TABLET, ORALLY DISINTEGRATING ORAL EVERY 6 HOURS PRN
Status: DISCONTINUED | OUTPATIENT
Start: 2023-02-17 | End: 2023-02-18 | Stop reason: HOSPADM

## 2023-02-17 RX ORDER — LIDOCAINE 40 MG/G
CREAM TOPICAL
Status: DISCONTINUED | OUTPATIENT
Start: 2023-02-17 | End: 2023-02-17 | Stop reason: HOSPADM

## 2023-02-17 RX ORDER — NALOXONE HYDROCHLORIDE 0.4 MG/ML
0.4 INJECTION, SOLUTION INTRAMUSCULAR; INTRAVENOUS; SUBCUTANEOUS
Status: DISCONTINUED | OUTPATIENT
Start: 2023-02-17 | End: 2023-02-18 | Stop reason: HOSPADM

## 2023-02-17 RX ORDER — ONDANSETRON 2 MG/ML
4 INJECTION INTRAMUSCULAR; INTRAVENOUS
Status: DISCONTINUED | OUTPATIENT
Start: 2023-02-17 | End: 2023-02-17 | Stop reason: HOSPADM

## 2023-02-17 RX ORDER — FLUMAZENIL 0.1 MG/ML
0.2 INJECTION, SOLUTION INTRAVENOUS
Status: ACTIVE | OUTPATIENT
Start: 2023-02-17 | End: 2023-02-17

## 2023-02-17 RX ORDER — NALOXONE HYDROCHLORIDE 0.4 MG/ML
0.2 INJECTION, SOLUTION INTRAMUSCULAR; INTRAVENOUS; SUBCUTANEOUS
Status: DISCONTINUED | OUTPATIENT
Start: 2023-02-17 | End: 2023-02-18 | Stop reason: HOSPADM

## 2023-02-17 RX ORDER — SODIUM CHLORIDE, SODIUM LACTATE, POTASSIUM CHLORIDE, CALCIUM CHLORIDE 600; 310; 30; 20 MG/100ML; MG/100ML; MG/100ML; MG/100ML
INJECTION, SOLUTION INTRAVENOUS CONTINUOUS
Status: DISCONTINUED | OUTPATIENT
Start: 2023-02-17 | End: 2023-02-17 | Stop reason: HOSPADM

## 2023-02-17 RX ORDER — GLYCOPYRROLATE 0.2 MG/ML
INJECTION, SOLUTION INTRAMUSCULAR; INTRAVENOUS PRN
Status: DISCONTINUED | OUTPATIENT
Start: 2023-02-17 | End: 2023-02-17

## 2023-02-17 RX ORDER — LIDOCAINE HYDROCHLORIDE 20 MG/ML
INJECTION, SOLUTION INFILTRATION; PERINEURAL PRN
Status: DISCONTINUED | OUTPATIENT
Start: 2023-02-17 | End: 2023-02-17

## 2023-02-17 RX ORDER — PROCHLORPERAZINE MALEATE 10 MG
10 TABLET ORAL EVERY 6 HOURS PRN
Status: DISCONTINUED | OUTPATIENT
Start: 2023-02-17 | End: 2023-02-18 | Stop reason: HOSPADM

## 2023-02-17 RX ORDER — PROPOFOL 10 MG/ML
INJECTION, EMULSION INTRAVENOUS CONTINUOUS PRN
Status: DISCONTINUED | OUTPATIENT
Start: 2023-02-17 | End: 2023-02-17

## 2023-02-17 RX ADMIN — PROPOFOL 10 MG: 10 INJECTION, EMULSION INTRAVENOUS at 11:15

## 2023-02-17 RX ADMIN — PROPOFOL 30 MG: 10 INJECTION, EMULSION INTRAVENOUS at 11:02

## 2023-02-17 RX ADMIN — PROPOFOL 200 MCG/KG/MIN: 10 INJECTION, EMULSION INTRAVENOUS at 10:59

## 2023-02-17 RX ADMIN — LIDOCAINE HYDROCHLORIDE 80 MG: 20 INJECTION, SOLUTION INFILTRATION; PERINEURAL at 10:58

## 2023-02-17 RX ADMIN — SODIUM CHLORIDE, SODIUM LACTATE, POTASSIUM CHLORIDE, CALCIUM CHLORIDE: 600; 310; 30; 20 INJECTION, SOLUTION INTRAVENOUS at 10:15

## 2023-02-17 RX ADMIN — SODIUM CHLORIDE, SODIUM LACTATE, POTASSIUM CHLORIDE, CALCIUM CHLORIDE: 600; 310; 30; 20 INJECTION, SOLUTION INTRAVENOUS at 10:57

## 2023-02-17 RX ADMIN — PROPOFOL 20 MG: 10 INJECTION, EMULSION INTRAVENOUS at 11:17

## 2023-02-17 RX ADMIN — PROPOFOL 40 MG: 10 INJECTION, EMULSION INTRAVENOUS at 11:01

## 2023-02-17 RX ADMIN — GLYCOPYRROLATE 0.2 MG: 0.2 INJECTION, SOLUTION INTRAMUSCULAR; INTRAVENOUS at 11:01

## 2023-02-17 NOTE — ANESTHESIA CARE TRANSFER NOTE
Patient: Eliezer Ellis    Procedure: Procedure(s):  COLONOSCOPY  ESOPHAGOGASTRODUODENOSCOPY, WITH BIOPSY       Diagnosis: Nausea [R11.0]  Diagnosis Additional Information: No value filed.    Anesthesia Type:   No value filed.     Note:    Oropharynx: spontaneously breathing  Level of Consciousness: awake  Oxygen Supplementation: room air    Independent Airway: airway patency satisfactory and stable  Dentition: dentition unchanged    Report to RN Given: handoff report given  Patient transferred to: Phase II    Handoff Report: Identifed the Patient, Identified the Reponsible Provider, Reviewed the pertinent medical history, Discussed the surgical course, Reviewed Intra-OP anesthesia mangement and issues during anesthesia, Set expectations for post-procedure period and Allowed opportunity for questions and acknowledgement of understanding      Vitals:  Vitals Value Taken Time   /75 02/17/23 1131   Temp 36.1  C (97  F) 02/17/23 1131   Pulse 90 02/17/23 1131   Resp 16 02/17/23 1131   SpO2 96 % 02/17/23 1131       Electronically Signed By: MARSHAL Kruger CRNA  February 17, 2023  11:32 AM

## 2023-02-17 NOTE — H&P
Eliezer OSHEA Rhonda  [id number]  male  38 year old      Reason for procedure/surgery: nausea, rectal bleeding    Patient Active Problem List   Diagnosis     Onychomycosis     CARDIOVASCULAR SCREENING; LDL GOAL LESS THAN 160     Prehypertension     Anxiety attack     Anxiety     Mixed hyperlipidemia     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     Encounter for pharmacogenetic testing     Moderate episode of recurrent major depressive disorder (H)     Chronic daily headache     Migraine equivalent     Morbid obesity (H)     Weight gain     Gastroesophageal reflux disease with esophagitis, unspecified whether hemorrhage     Nausea     Chronic migraine without aura without status migrainosus, not intractable     History of canker sores     Reactive depression     Rib pain on right side       Past Surgical History:  History reviewed. No pertinent surgical history.    Past Medical History:   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 7/10/2017     Hyperlipidemia LDL goal <160 11/1/2013     Intermittent asthma 9/29/2011    reports no active problems     Seasonal allergies 9/29/2011       Social History:   Social History     Tobacco Use     Smoking status: Never     Smokeless tobacco: Never   Substance Use Topics     Alcohol use: Not Currently     Alcohol/week: 0.0 standard drinks     Comment: 2-3x week       Family History:   Family History   Problem Relation Age of Onset     Family History Negative Mother      Diabetes Mother         Gestational Diabetes 1969 (no late onset)     Hypertension Mother         Controlled by meds     Hyperlipidemia Mother         Controlled by meds     Other Cancer Mother         Basal cell skin cancers     Anesthesia Reaction Mother      Osteoporosis Mother         Osteopenia     Obesity Mother         Unsure  if overweight or obese     Blood Disease Father 65        VTE, on coumadin     Hypertension Father         Controlled by meds     Hyperlipidemia Father         Controlled by meds     Other Cancer Father         Benign tumor on pituitary gland     Genetic Disorder Father         Mesenteric vein thrombosis - genetic. Test showed antithrombin 3 deficiency. Deep vein thrombosis, controlled by warfarin     Obesity Father         Unsure if overweight or obese     Diabetes Maternal Grandmother         Diabetes     Hypertension Maternal Grandmother      Breast Cancer Maternal Grandmother      Other Cancer Maternal Grandmother         Melanoma, Basal cell skin cancer     Genetic Disorder Maternal Grandmother         Parkinson's disease (don't know if genetic)     Coronary Artery Disease Maternal Grandfather         Angina, heart attack     Genetic Disorder Maternal Grandfather         Tata Gehrig's disease     Thyroid Disease Maternal Grandfather         Hypothyroidism     Coronary Artery Disease Paternal Grandfather         Bradycardia     Osteoporosis Paternal Grandfather      Genetic Disorder Paternal Grandfather         Blood clots - don't know if genetic     Hypertension Paternal Grandmother      Cerebrovascular Disease Paternal Grandmother      Osteoporosis Paternal Grandmother      Genetic Disorder Paternal Grandmother         Blood clot - don't know if genetic     Anxiety Disorder Sister      Obesity Sister        Allergies:   Allergies   Allergen Reactions     Dust Mites Shortness Of Breath     Mold Shortness Of Breath     Cats Itching     Other Food Allergy Difficulty breathing, Headache and Visual Disturbance     Pcn [Penicillins]      Seasonal Allergies        Active Medications:   Current Outpatient Medications   Medication Sig Dispense Refill     albuterol (PROAIR HFA/PROVENTIL HFA/VENTOLIN HFA) 108 (90 Base) MCG/ACT inhaler Inhale 1-2 puffs into the lungs every 4 hours as needed for shortness of breath /  dyspnea or wheezing 18 g 0     allopurinol (ZYLOPRIM) 100 MG tablet Take 1 tablet (100 mg) by mouth daily 90 tablet 3     ALPRAZolam (XANAX) 1 MG tablet Take 1 tablet (1 mg) by mouth once as needed for anxiety 15 tablet 0     cetirizine (ZYRTEC) 10 MG tablet Take 10 mg by mouth daily.       desvenlafaxine (PRISTIQ) 50 MG 24 hr tablet Take 1 tablet (50 mg) by mouth daily 90 tablet 3     omeprazole (PRILOSEC) 40 MG DR capsule Take 1 capsule (40 mg) by mouth daily 90 capsule 3     VITAMIN D PO Take 1,000 Units by mouth daily Pt takes 2 tabs daily in the AM       bisacodyl (DULCOLAX) 5 MG EC tablet Take 2 tablets at 3 pm the day before your procedure. If your procedure is before 11 am, take 2 additional tablets at 11 pm. If your procedure is after 11 am, take 2 additional tablets at 6 am. For additional instructions refer to your colonoscopy prep instructions. 4 tablet 0     fluocinonide (LIDEX) 0.05 % external gel Apply topically 2 times daily 30 g 1     order for DME Equipment being ordered: digital Blood pressure apparatus 1 Box 0     polyethylene glycol (GOLYTELY) 236 g suspension The night before the exam at 6 pm drink an 8-ounce glass every 15 minutes until the jug is half empty. If you arrive before 11 AM: Drink the other half of the Golytely jug at 11 PM night before procedure. If you arrive after 11 AM: Drink the other half of the Golytely jug at 6 AM day of procedure. For additional instructions refer to your colonoscopy prep instructions. 4000 mL 0       Systemic Review:   ENT/MOUTH: NEGATIVE for ear, mouth and throat problems  RESP: NEGATIVE for significant cough or SOB  CV: NEGATIVE for chest pain, palpitations or peripheral edema    Physical Examination:   Vital Signs: /81 (BP Location: Right arm)   Pulse 89   Temp 97.9  F (36.6  C) (Temporal)   Resp 18   Ht 1.829 m (6')   Wt 120.2 kg (265 lb)   SpO2 98%   BMI 35.94 kg/m    NECK: no adenopathy, no asymmetry, masses, or scars  RESP: lungs clear  to auscultation - no rales, rhonchi or wheezes  CV: regular rate and rhythm, normal S1 S2, no S3 or S4, no murmur, click or rub, no peripheral edema and peripheral pulses strong  ABDOMEN: soft, nontender, no hepatosplenomegaly, no masses and bowel sounds normal  MS: no gross musculoskeletal defects noted, no edema    Plan: Appropriate to proceed as scheduled.      Jeremy Phillips MD  2/17/2023    PCP:  Ursula Huang

## 2023-02-20 ASSESSMENT — HEADACHE IMPACT TEST (HIT 6)
HOW OFTEN DO HEADACHES LIMIT YOUR DAILY ACTIVITIES: SOMETIMES
HOW OFTEN HAVE YOU FELT TOO TIRED TO WORK BECAUSE OF YOUR HEADACHES: RARELY
HIT6 TOTAL SCORE: 58
WHEN YOU HAVE HEADACHES HOW OFTEN IS THE PAIN SEVERE: SOMETIMES
WHEN YOU HAVE A HEADACHE HOW OFTEN DO YOU WISH YOU COULD LIE DOWN: SOMETIMES
HOW OFTEN HAVE YOU FELT FED UP OR IRRITATED BECAUSE OF YOUR HEADACHES: SOMETIMES
HOW OFTEN DID HEADACHS LIMIT CONCENTRATION ON WORK OR DAILY ACTIVITY: SOMETIMES

## 2023-02-21 DIAGNOSIS — G43.709 CHRONIC MIGRAINE WITHOUT AURA WITHOUT STATUS MIGRAINOSUS, NOT INTRACTABLE: Primary | ICD-10-CM

## 2023-02-21 LAB
PATH REPORT.ADDENDUM SPEC: NORMAL
PATH REPORT.COMMENTS IMP SPEC: NORMAL
PATH REPORT.FINAL DX SPEC: NORMAL
PATH REPORT.GROSS SPEC: NORMAL
PATH REPORT.MICROSCOPIC SPEC OTHER STN: NORMAL
PATH REPORT.RELEVANT HX SPEC: NORMAL
PHOTO IMAGE: NORMAL

## 2023-02-21 RX ORDER — NARATRIPTAN 2.5 MG/1
2.5 TABLET ORAL
Qty: 18 TABLET | Refills: 11 | Status: SHIPPED | OUTPATIENT
Start: 2023-02-21 | End: 2023-02-23

## 2023-02-22 NOTE — ANESTHESIA PREPROCEDURE EVALUATION
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 7/10/2017     Hyperlipidemia LDL goal <160 2013     Intermittent asthma 2011    reports no active problems     Seasonal allergies 2011      Past Surgical History:   Procedure Laterality Date     COLONOSCOPY N/A 2023    Procedure: COLONOSCOPY;  Surgeon: Jeremy Phillips MD;  Location: [facility] OR     ESOPHAGOSCOPY, GASTROSCOPY, DUODENOSCOPY (EGD), COMBINED N/A 2023    Procedure: ESOPHAGOGASTRODUODENOSCOPY, WITH BIOPSY;  Surgeon: Jeremy Phillips MD;  Location: [facility] OR      Allergies   Allergen Reactions     Dust Mites Shortness Of Breath     Mold Shortness Of Breath     Cats Itching     Other Food Allergy Difficulty breathing, Headache and Visual Disturbance     Pcn [Penicillins]      Seasonal Allergies       Social History     Tobacco Use     Smoking status: Never     Smokeless tobacco: Never   Substance Use Topics     Alcohol use: Not Currently     Alcohol/week: 0.0 standard drinks     Comment: 2-3x week      Wt Readings from Last 1 Encounters:   23 120.2 kg (265 lb)        Anesthesia Evaluation   Pt has had prior anesthetic.     No history of anesthetic complications       ROS/MED HX  ENT/Pulmonary:     (+) Intermittent, asthma     Neurologic:     (+) migraines,     Cardiovascular:     (+) Dyslipidemia -----    METS/Exercise Tolerance: >4 METS    Hematologic:       Musculoskeletal:       GI/Hepatic:    (-) GERD and liver disease   Renal/Genitourinary:    (-) renal disease   Endo:     (+) Obesity,     Psychiatric/Substance Use:       Infectious Disease:       Malignancy:       Other:            Physical  Exam    Airway  airway exam normal           Respiratory Devices and Support         Dental       (+) Minor Abnormalities - some fillings, tiny chips      Cardiovascular   cardiovascular exam normal          Pulmonary   pulmonary exam normal                OUTSIDE LABS:  CBC:   Lab Results   Component Value Date    WBC 7.8 10/25/2013    HGB 15.6 10/25/2013    HCT 43.7 10/25/2013     10/25/2013     BMP:   Lab Results   Component Value Date     06/13/2022     04/30/2021    POTASSIUM 4.3 06/13/2022    POTASSIUM 4.2 04/30/2021    CHLORIDE 107 06/13/2022    CHLORIDE 109 04/30/2021    CO2 26 06/13/2022    CO2 26 04/30/2021    BUN 15 06/13/2022    BUN 15 04/30/2021    CR 0.83 06/13/2022    CR 0.98 04/30/2021    GLC 93 06/13/2022    GLC 88 04/30/2021     COAGS: No results found for: PTT, INR, FIBR  POC: No results found for: BGM, HCG, HCGS  HEPATIC:   Lab Results   Component Value Date    ALBUMIN 4.1 06/13/2022    PROTTOTAL 7.6 06/13/2022    ALT 42 06/13/2022    AST 21 06/13/2022    GGT 78 (H) 07/10/2017    ALKPHOS 89 06/13/2022    BILITOTAL 0.5 06/13/2022     OTHER:   Lab Results   Component Value Date    A1C 4.8 06/13/2022    SUZANNE 9.2 06/13/2022    TSH 6.47 (H) 06/13/2022    T4 0.76 06/13/2022       Anesthesia Plan    ASA Status:  2   NPO Status:  NPO Appropriate    Anesthesia Type: MAC.     - Reason for MAC: straight local not clinically adequate   Induction: Intravenous.   Maintenance: TIVA.        Consents    Anesthesia Plan(s) and associated risks, benefits, and realistic alternatives discussed. Questions answered and patient/representative(s) expressed understanding.    - Discussed:     - Discussed with:  Patient         Postoperative Care       PONV prophylaxis: Ondansetron (or other 5HT-3)     Comments:                Alin Chisholm MD

## 2023-02-22 NOTE — ANESTHESIA POSTPROCEDURE EVALUATION
Patient: Eliezer Ellis    Procedure: Procedure(s):  COLONOSCOPY  ESOPHAGOGASTRODUODENOSCOPY, WITH BIOPSY       Anesthesia Type:  MAC    Note:  Disposition: Outpatient   Postop Pain Control: Uneventful            Sign Out: Well controlled pain   PONV: No   Neuro/Psych: Uneventful            Sign Out: Acceptable/Baseline neuro status   Airway/Respiratory: Uneventful            Sign Out: Acceptable/Baseline resp. status   CV/Hemodynamics: Uneventful            Sign Out: Acceptable CV status; No obvious hypovolemia; No obvious fluid overload   Other NRE: NONE   DID A NON-ROUTINE EVENT OCCUR? No           Last vitals:  Vitals Value Taken Time   /75 02/17/23 1131   Temp 36.1  C (97  F) 02/17/23 1131   Pulse 90 02/17/23 1131   Resp 16 02/17/23 1131   SpO2 96 % 02/17/23 1131       Electronically Signed By: Alin Chisholm MD  February 21, 2023  8:12 PM

## 2023-02-23 DIAGNOSIS — G43.709 CHRONIC MIGRAINE WITHOUT AURA WITHOUT STATUS MIGRAINOSUS, NOT INTRACTABLE: ICD-10-CM

## 2023-02-23 RX ORDER — NARATRIPTAN 2.5 MG/1
2.5 TABLET ORAL
Qty: 18 TABLET | Refills: 11 | Status: SHIPPED | OUTPATIENT
Start: 2023-02-23 | End: 2023-07-13

## 2023-02-26 NOTE — PROGRESS NOTES
GI CLINIC VISIT    CC/REFERRING MD:  Dr. Ursula Huang  REASON FOR CONSULTATION: nausea    ASSESSMENT/PLAN: 39 yo M with migraine, daily headache, anxiety, depression intolerance to selective serotonin reuptake inhibitor/SNRI who presents to GI clinic for nausea.    # Nausea episodes  # Chronic gastritis  Started 6 months ago. Sudden on and resolution which is not typical for GI cause of nausea. No gallstone on prior US.  Other possible cause is marijuana use (though rare use). No improvement with PPI, not associated with eating. No new medications or an obvious culprit medication. Per patient's discussion with neurology, it is possible that this is neurologic and related to headaches/migraines although the symptoms typically occur together. Given patients recent negative EGD and constellation of symptoms, we do not think that the episodes of nausea are related to a GI cause.     # Rectal bleeding, for a few year. More with wiping. Colonoscopy with small hemorrhoid.    # Liver complex cyst vs hemangioma on US 6/2022  This would not explain his nausea episode.    Plan  --We will have our pharmacist review the patients medications in order to determine if any are likely the cause of his sxs  -- MRI of the liver   -- Stop marijuana use  -- Stop PPI    RTC: PRN    Patient is seen and discussed with Dr. Mirian Diop.    45 minutes spent on the date of the encounter performing chart review, history and exam, documentation and further activities as noted above.  .  Zohreh Cortez M.D.   GI Fellow      --  HPI 39 yo M with anxiety, depression intolerance to selective serotonin reuptake inhibitor/SNRI who presents to GI clinic for nausea episode.     Nausea started since mid 2022. Occurs mostly in the late morning to late afternoon, daily; less frequent for the past 2-3 weeks. Lasts for 15-20 minutes each episode. No clear trigger as not related to positioning, food, or exertion. No dizziness or lightheadedness during the  episode. Then he sat down. Took Amirole, soda, cracker sometimes help. Self resolved. Afterward, no nausea at all until the next episode.    No abdominal pain. No acid refluxes or heartburn. No bloating. Has bowel movement once a day, no blood in the stool, but sometimes with wiping. Blood comes and goes for several years.     Is on omeprazole for since 10/2022, which helps with nausea. H.pylori testing negative (10/21/22) in the setting of on PPI for a month.     - NSAID use: ibuprofen 600 mg every 3 days, when bad headache can be up to twice a day  - Cannabis use: rare  - Taking fiber gummies fiber once a day     Bought a house this year. No recent new stress for the past year.  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   Smoking: none  No alcohol - stopped right before pandemic 2020  Marijuana: using less than once a month. Started last year.   Exercise walking    Food allergy - fermented food (amine) -throat closing, headache    Interval history 2/28/23:  Patient reports increasing his omeprazole dose to 40mg every day with no change in the intermittent episodes of nausea without vomiting (occurs ~3x/wk, lasts 15 minutes and then resolves spontaneously, not associated with food or eating). Patient has not taken NSAIDs and has not used marijuana in many months. He is not having issues with constipation.             PREVIOUS ENDOSCOPY:  EGD 2/17/23: normal endoscopy finding  - patho:  - Gastric mucosa with mild chronic inactive gastritis  - Negative for intestinal metaplasia or dysplasia   Colonoscopy 2/17/23: normal TI, colon, mild internal hemorrhoid      ROS: 10 points reviewed negative apart from above.    PROBLEM LIST reviewed.    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 7/10/2017     Hyperlipidemia LDL goal <160  11/1/2013     Intermittent asthma 9/29/2011    reports no active problems     Seasonal allergies 9/29/2011       PREVIOUS SURGERIES:  Past Surgical History:   Procedure Laterality Date     COLONOSCOPY N/A 2/17/2023    Procedure: COLONOSCOPY;  Surgeon: Jeremy Phillips MD;  [place] [facility] [place]     ESOPHAGOSCOPY, GASTROSCOPY, DUODENOSCOPY (EGD), COMBINED N/A 2/17/2023    Procedure: ESOPHAGOGASTRODUODENOSCOPY, WITH BIOPSY;  Surgeon: Jeremy Phillips MD;  [place] [facility] [place]       ALLERGIES:     Allergies   Allergen Reactions     Dust Mites Shortness Of Breath     Mold Shortness Of Breath     Cats Itching     Other Food Allergy Difficulty breathing, Headache and Visual Disturbance     Pcn [Penicillins]      Seasonal Allergies        PERTINENT MEDICATIONS:    Current Outpatient Medications:      albuterol (PROAIR HFA/PROVENTIL HFA/VENTOLIN HFA) 108 (90 Base) MCG/ACT inhaler, Inhale 1-2 puffs into the lungs every 4 hours as needed for shortness of breath / dyspnea or wheezing, Disp: 18 g, Rfl: 0     allopurinol (ZYLOPRIM) 100 MG tablet, Take 1 tablet (100 mg) by mouth daily, Disp: 90 tablet, Rfl: 3     ALPRAZolam (XANAX) 1 MG tablet, Take 1 tablet (1 mg) by mouth once as needed for anxiety, Disp: 15 tablet, Rfl: 0     bisacodyl (DULCOLAX) 5 MG EC tablet, Take 2 tablets at 3 pm the day before your procedure. If your procedure is before 11 am, take 2 additional tablets at 11 pm. If your procedure is after 11 am, take 2 additional tablets at 6 am. For additional instructions refer to your colonoscopy prep instructions., Disp: 4 tablet, Rfl: 0     cetirizine (ZYRTEC) 10 MG tablet, Take 10 mg by mouth daily., Disp: , Rfl:      desvenlafaxine (PRISTIQ) 50 MG 24 hr tablet, Take 1 tablet (50 mg) by mouth daily, Disp: 90 tablet, Rfl: 3     fluocinonide (LIDEX) 0.05 % external gel, Apply topically 2 times daily, Disp: 30 g, Rfl: 1     naratriptan (AMERGE) 2.5 MG tablet, Take 1 tablet (2.5 mg) by mouth at onset of headache for  migraine (repeat in 4 hours if needed) May repeat in 4 hours. Max 2 tablets/24 hours., Disp: 18 tablet, Rfl: 11     omeprazole (PRILOSEC) 40 MG DR capsule, Take 1 capsule (40 mg) by mouth daily, Disp: 90 capsule, Rfl: 3     order for DME, Equipment being ordered: digital Blood pressure apparatus, Disp: 1 Box, Rfl: 0     polyethylene glycol (GOLYTELY) 236 g suspension, The night before the exam at 6 pm drink an 8-ounce glass every 15 minutes until the jug is half empty. If you arrive before 11 AM: Drink the other half of the Golytely jug at 11 PM night before procedure. If you arrive after 11 AM: Drink the other half of the Golytely jug at 6 AM day of procedure. For additional instructions refer to your colonoscopy prep instructions., Disp: 4000 mL, Rfl: 0     VITAMIN D PO, Take 1,000 Units by mouth daily Pt takes 2 tabs daily in the AM, Disp: , Rfl:     Current Facility-Administered Medications:      Botulinum Toxin Type A (BOTOX) 200 units injection 150 Units, 150 Units, Intramuscular, See Admin Instructions, Sol Linares MD, 150 Units at 11/21/22 1428     Botulinum Toxin Type A (BOTOX) 200 units injection 150 Units, 150 Units, Intramuscular, See Admin Instructions, Sol Linares MD, 150 Units at 08/22/22 1306     Botulinum Toxin Type A (BOTOX) 200 units injection 155 Units, 155 Units, Intramuscular, See Admin Instructions, Sol Linares MD    SOCIAL HISTORY:  Social History     Socioeconomic History     Marital status: Single     Spouse name: Not on file     Number of children: Not on file     Years of education: Not on file     Highest education level: Not on file   Occupational History     Not on file   Tobacco Use     Smoking status: Never     Smokeless tobacco: Never   Substance and Sexual Activity     Alcohol use: Not Currently     Alcohol/week: 0.0 standard drinks     Comment: 2-3x week     Drug use: No     Sexual activity: Yes     Partners: Female     Birth control/protection: Pill    Other Topics Concern     Parent/sibling w/ CABG, MI or angioplasty before 65F 55M? No   Social History Narrative    Nonsmoker.     Social Determinants of Health     Financial Resource Strain: Not on file   Food Insecurity: Not on file   Transportation Needs: Not on file   Physical Activity: Not on file   Stress: Not on file   Social Connections: Not on file   Intimate Partner Violence: Not on file   Housing Stability: Not on file       FAMILY HISTORY:  No family history of colon cancer, stomach cancer, or esophageal cancer.  Family History   Problem Relation Age of Onset     Family History Negative Mother      Diabetes Mother         Gestational Diabetes 1969 (no late onset)     Hypertension Mother         Controlled by meds     Hyperlipidemia Mother         Controlled by meds     Other Cancer Mother         Basal cell skin cancers     Anesthesia Reaction Mother      Osteoporosis Mother         Osteopenia     Obesity Mother         Unsure if overweight or obese     Blood Disease Father 65        VTE, on coumadin     Hypertension Father         Controlled by meds     Hyperlipidemia Father         Controlled by meds     Other Cancer Father         Benign tumor on pituitary gland     Genetic Disorder Father         Mesenteric vein thrombosis - genetic. Test showed antithrombin 3 deficiency. Deep vein thrombosis, controlled by warfarin     Obesity Father         Unsure if overweight or obese     Diabetes Maternal Grandmother         Diabetes     Hypertension Maternal Grandmother      Breast Cancer Maternal Grandmother      Other Cancer Maternal Grandmother         Melanoma, Basal cell skin cancer     Genetic Disorder Maternal Grandmother         Parkinson's disease (don't know if genetic)     Coronary Artery Disease Maternal Grandfather         Angina, heart attack     Genetic Disorder Maternal Grandfather         Tata Gehrig's disease     Thyroid Disease Maternal Grandfather         Hypothyroidism     Coronary Artery  Disease Paternal Grandfather         Bradycardia     Osteoporosis Paternal Grandfather      Genetic Disorder Paternal Grandfather         Blood clots - don't know if genetic     Hypertension Paternal Grandmother      Cerebrovascular Disease Paternal Grandmother      Osteoporosis Paternal Grandmother      Genetic Disorder Paternal Grandmother         Blood clot - don't know if genetic     Anxiety Disorder Sister      Obesity Sister        PHYSICAL EXAMINATION:  Constitutional: aaox3, cooperative, pleasant, not dyspneic/diaphoretic, no acute distress  Vitals reviewed: There were no vitals taken for this visit.  Wt:   Wt Readings from Last 2 Encounters:   02/17/23 120.2 kg (265 lb)   11/22/22 123.7 kg (272 lb 9.6 oz)      Eyes: Sclera anicteric/injected  Ears/nose/mouth/throat: mucus membranes moist, hearing intact  CV: No edema  Respiratory: Unlabored breathing  Abd: Nondistended  Skin: warm, perfused, no jaundice  Psych: Normal affect  MSK: Normal gait    PERTINENT STUDIES:  US Abdomen: 6/24/2022:   1. Hepatic steatosis.  2. Indeterminate hypoechoic subcentimeter focus in the left lobe of  liver, possibly a mildly complex cyst or hemangioma. Consider follow  up ultrasound if clinically indicated.  3. Unremarkable gallbladder.

## 2023-02-27 ENCOUNTER — OFFICE VISIT (OUTPATIENT)
Dept: NEUROLOGY | Facility: CLINIC | Age: 38
End: 2023-02-27
Payer: COMMERCIAL

## 2023-02-27 DIAGNOSIS — G43.709 CHRONIC MIGRAINE WITHOUT AURA WITHOUT STATUS MIGRAINOSUS, NOT INTRACTABLE: Primary | ICD-10-CM

## 2023-02-27 PROCEDURE — 64615 CHEMODENERV MUSC MIGRAINE: CPT | Performed by: PSYCHIATRY & NEUROLOGY

## 2023-02-27 NOTE — PROGRESS NOTES
[facility]  Botulinum Toxin Procedure    Sol Linares MD  Headache Neurology    February 27, 2023    Procedure:  OnabotulinumtoxinA injections for chronic migraine  Indication:  Chronic migraine    Mr. Ellis suffers from severe intractable headaches.  He was referred by Dr. Linares for onabotulinumtoxinA injections for headache.  Risks, benefits, and alternatives were discussed.  All questions were answered and consent given.  He decided to proceed with the injections.      Prior to initiation of botulinum toxin injections, Mr. Ellis reported 20 headache days per month, with 20 severe headache days per month. His headaches are quite disabling and often interfere with his ability to function normally.     He reports 12-14 headache days per month, with 12-14 severe headache days per month. He stopped NSAIDs since our last visit, which resulted in worse headaches. He is going to trial naratriptan for these.     He has attempted other migraine prophylactic treatments in the past, which have included: Emgality, desvenlafaxine, topiramate, amitriptyline, buspirone, venlafaxine, propranolol.       He currently takes desvenlafaxine for headache prevention.    Procedural Pause: Procedural pause was conducted to verify correct patient identity, procedure to be performed, correct side and site, correct patient position, and special requirements. Appropriate hand hygiene was utilized, and each injection site was prepped with alcohol wipes or Chloraprep swab.     Procedure Details: 200 units of onabotulinumtoxinA was diluted in 4 mL 0.9% normal saline. A total of 150 units of onabotulinumtoxinA were injected using 30 gauge 0.5 in needles into the muscles listed below. 50 units of onabotulinumtoxinA were wasted.     Injection Sites: Total = 150 units onabotulinumtoxinA      and Procerus muscles - 5 units into the left and right corrugators and 5 units into the procerus (15 units total)    Frontalis muscles - 5  units into the left superior frontalis and 5 units into the right superior frontalis (2 injection sites per muscle) (10 units total)    Temporalis muscles - 12.5 units into the left temporalis muscle and 12.5 units into the right temporalis muscle (2 injection sites per muscle) (25 units total)    Occipitalis muscles - 12.5 units into the left occipitalis muscle and 12.5 units into the right occipitalis muscle (2 injection sites per muscle) (25 units total)    Splenius Capitis muscles - 12.5 units into the left splenius capitis muscle and 12.5 units into the right splenius capitis muscle (2 injection sites per muscle, divided into 2/3 anteriorly and 1/3 posteriorly) (25 units total)      Trapezius muscles - 25 units into the left trapezius muscle and 25 units into the right trapezius muscle (3 injection sites per muscle, divided 5 units, 10 units, 10 units, medial to lateral) (50 units total)    Mr. Ellis tolerated the procedure well without immediate complications.  He will follow up in clinic for assessment of the effectiveness of treatment.  He did not report any change in his pain level after the botulinumtoxinA injection procedure.    Sol Linares MD  Headache Neurology  [facility]

## 2023-02-27 NOTE — LETTER
2/27/2023       RE: Eliezer Ellis  [address]  [facility] [place] [id number]     Dear Colleague,    Thank you for referring your patient, Eliezer Ellis, to the [facility] [place] [facility]. Please see a copy of my visit note below.    [facility]  Botulinum Toxin Procedure    Sol Linares MD  Headache Neurology    February 27, 2023    Procedure:  OnabotulinumtoxinA injections for chronic migraine  Indication:  Chronic migraine    Mr. Ellis suffers from severe intractable headaches.  He was referred by Dr. Linares for onabotulinumtoxinA injections for headache.  Risks, benefits, and alternatives were discussed.  All questions were answered and consent given.  He decided to proceed with the injections.      Prior to initiation of botulinum toxin injections, Mr. Ellis reported 20 headache days per month, with 20 severe headache days per month. His headaches are quite disabling and often interfere with his ability to function normally.     He reports 12-14 headache days per month, with 12-14 severe headache days per month. He stopped NSAIDs since our last visit, which resulted in worse headaches. He is going to trial naratriptan for these.     He has attempted other migraine prophylactic treatments in the past, which have included: Emgality, desvenlafaxine, topiramate, amitriptyline, buspirone, venlafaxine, propranolol.       He currently takes desvenlafaxine for headache prevention.    Procedural Pause: Procedural pause was conducted to verify correct patient identity, procedure to be performed, correct side and site, correct patient position, and special requirements. Appropriate hand hygiene was utilized, and each injection site was prepped with alcohol wipes or Chloraprep swab.     Procedure Details: 200 units of onabotulinumtoxinA was diluted in 4 mL 0.9% normal saline. A total of 150 units of onabotulinumtoxinA were injected  using 30 gauge 0.5 in needles into the muscles listed below. 50 units of onabotulinumtoxinA were wasted.     Injection Sites: Total = 150 units onabotulinumtoxinA      and Procerus muscles - 5 units into the left and right corrugators and 5 units into the procerus (15 units total)    Frontalis muscles - 5 units into the left superior frontalis and 5 units into the right superior frontalis (2 injection sites per muscle) (10 units total)    Temporalis muscles - 12.5 units into the left temporalis muscle and 12.5 units into the right temporalis muscle (2 injection sites per muscle) (25 units total)    Occipitalis muscles - 12.5 units into the left occipitalis muscle and 12.5 units into the right occipitalis muscle (2 injection sites per muscle) (25 units total)    Splenius Capitis muscles - 12.5 units into the left splenius capitis muscle and 12.5 units into the right splenius capitis muscle (2 injection sites per muscle, divided into 2/3 anteriorly and 1/3 posteriorly) (25 units total)      Trapezius muscles - 25 units into the left trapezius muscle and 25 units into the right trapezius muscle (3 injection sites per muscle, divided 5 units, 10 units, 10 units, medial to lateral) (50 units total)    Mr. Ellis tolerated the procedure well without immediate complications.  He will follow up in clinic for assessment of the effectiveness of treatment.  He did not report any change in his pain level after the botulinumtoxinA injection procedure.    Sol Linares MD  Headache Neurology  [facility]

## 2023-02-28 ENCOUNTER — OFFICE VISIT (OUTPATIENT)
Dept: GASTROENTEROLOGY | Facility: CLINIC | Age: 38
End: 2023-02-28
Payer: COMMERCIAL

## 2023-02-28 VITALS
OXYGEN SATURATION: 99 % | HEIGHT: 72 IN | WEIGHT: 271.4 LBS | HEART RATE: 83 BPM | DIASTOLIC BLOOD PRESSURE: 84 MMHG | BODY MASS INDEX: 36.76 KG/M2 | SYSTOLIC BLOOD PRESSURE: 117 MMHG

## 2023-02-28 DIAGNOSIS — R11.0 NAUSEA: Primary | ICD-10-CM

## 2023-02-28 PROCEDURE — 99214 OFFICE O/P EST MOD 30 MIN: CPT | Mod: GC | Performed by: STUDENT IN AN ORGANIZED HEALTH CARE EDUCATION/TRAINING PROGRAM

## 2023-02-28 ASSESSMENT — PAIN SCALES - GENERAL: PAINLEVEL: NO PAIN (0)

## 2023-02-28 NOTE — LETTER
2/28/2023         RE: Eliezer Ellis  [address]  [facility] [address]        Dear Colleague,    Thank you for referring your patient, Eliezer Ellis, to the [facility] [place]. Please see a copy of my visit note below.    GI CLINIC VISIT    CC/REFERRING MD:  Dr. Ursula Huang  REASON FOR CONSULTATION: nausea    ASSESSMENT/PLAN: 37 yo M with migraine, daily headache, anxiety, depression intolerance to selective serotonin reuptake inhibitor/SNRI who presents to GI clinic for nausea.    # Nausea episodes  # Chronic gastritis  Started 6 months ago. Sudden on and resolution which is not typical for GI cause of nausea. No gallstone on prior US.  Other possible cause is marijuana use (though rare use). No improvement with PPI, not associated with eating. No new medications or an obvious culprit medication. Per patient's discussion with neurology, it is possible that this is neurologic and related to headaches/migraines although the symptoms typically occur together. Given patients recent negative EGD and constellation of symptoms, we do not think that the episodes of nausea are related to a GI cause.     # Rectal bleeding, for a few year. More with wiping. Colonoscopy with small hemorrhoid.    # Liver complex cyst vs hemangioma on US 6/2022  This would not explain his nausea episode.    Plan  --We will have our pharmacist review the patients medications in order to determine if any are likely the cause of his sxs  -- MRI of the liver   -- Stop marijuana use  -- Stop PPI    RTC: PRN    Patient is seen and discussed with Dr. Mirian Diop.    45 minutes spent on the date of the encounter performing chart review, history and exam, documentation and further activities as noted above.  .  Zhoreh Cortez M.D.   GI Fellow      --  HPI 37 yo M with anxiety, depression intolerance to selective serotonin reuptake inhibitor/SNRI who presents to GI clinic for nausea episode.     Nausea  started since mid 2022. Occurs mostly in the late morning to late afternoon, daily; less frequent for the past 2-3 weeks. Lasts for 15-20 minutes each episode. No clear trigger as not related to positioning, food, or exertion. No dizziness or lightheadedness during the episode. Then he sat down. Took Amirole, soda, cracker sometimes help. Self resolved. Afterward, no nausea at all until the next episode.    No abdominal pain. No acid refluxes or heartburn. No bloating. Has bowel movement once a day, no blood in the stool, but sometimes with wiping. Blood comes and goes for several years.     Is on omeprazole for since 10/2022, which helps with nausea. H.pylori testing negative (10/21/22) in the setting of on PPI for a month.     - NSAID use: ibuprofen 600 mg every 3 days, when bad headache can be up to twice a day  - Cannabis use: rare  - Taking fiber gummies fiber once a day     Bought a house this year. No recent new stress for the past year.  Works for non-profit, operation director, [employer] work [employer]%.   Smoking: none  No alcohol - stopped right before pandemic 2020  Marijuana: using less than once a month. Started last year.   Exercise walking    Food allergy - fermented food (amine) -throat closing, headache    Interval history 2/28/23:  Patient reports increasing his omeprazole dose to 40mg every day with no change in the intermittent episodes of nausea without vomiting (occurs ~3x/wk, lasts 15 minutes and then resolves spontaneously, not associated with food or eating). Patient has not taken NSAIDs and has not used marijuana in many months. He is not having issues with constipation.             PREVIOUS ENDOSCOPY:  EGD 2/17/23: normal endoscopy finding  - patho:  - Gastric mucosa with mild chronic inactive gastritis  - Negative for intestinal metaplasia or dysplasia   Colonoscopy 2/17/23: normal TI, colon, mild internal hemorrhoid      ROS: 10 points reviewed negative apart from above.    PROBLEM LIST  reviewed.    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 7/10/2017     Hyperlipidemia LDL goal <160 11/1/2013     Intermittent asthma 9/29/2011    reports no active problems     Seasonal allergies 9/29/2011       PREVIOUS SURGERIES:  Past Surgical History:   Procedure Laterality Date     COLONOSCOPY N/A 2/17/2023    Procedure: COLONOSCOPY;  Surgeon: Jeremy Phillips MD;  [place] [facility] [place]     ESOPHAGOSCOPY, GASTROSCOPY, DUODENOSCOPY (EGD), COMBINED N/A 2/17/2023    Procedure: ESOPHAGOGASTRODUODENOSCOPY, WITH BIOPSY;  Surgeon: Jeremy Phillips MD;  [place] [facility] [place]       ALLERGIES:     Allergies   Allergen Reactions     Dust Mites Shortness Of Breath     Mold Shortness Of Breath     Cats Itching     Other Food Allergy Difficulty breathing, Headache and Visual Disturbance     Pcn [Penicillins]      Seasonal Allergies        PERTINENT MEDICATIONS:    Current Outpatient Medications:      albuterol (PROAIR HFA/PROVENTIL HFA/VENTOLIN HFA) 108 (90 Base) MCG/ACT inhaler, Inhale 1-2 puffs into the lungs every 4 hours as needed for shortness of breath / dyspnea or wheezing, Disp: 18 g, Rfl: 0     allopurinol (ZYLOPRIM) 100 MG tablet, Take 1 tablet (100 mg) by mouth daily, Disp: 90 tablet, Rfl: 3     ALPRAZolam (XANAX) 1 MG tablet, Take 1 tablet (1 mg) by mouth once as needed for anxiety, Disp: 15 tablet, Rfl: 0     bisacodyl (DULCOLAX) 5 MG EC tablet, Take 2 tablets at 3 pm the day before your procedure. If your procedure is before 11 am, take 2 additional tablets at 11 pm. If your procedure is after 11 am, take 2 additional tablets at 6 am. For additional instructions refer to your colonoscopy prep instructions., Disp: 4 tablet, Rfl: 0     cetirizine (ZYRTEC) 10 MG tablet, Take 10 mg by mouth daily., Disp: , Rfl:      desvenlafaxine  (PRISTIQ) 50 MG 24 hr tablet, Take 1 tablet (50 mg) by mouth daily, Disp: 90 tablet, Rfl: 3     fluocinonide (LIDEX) 0.05 % external gel, Apply topically 2 times daily, Disp: 30 g, Rfl: 1     naratriptan (AMERGE) 2.5 MG tablet, Take 1 tablet (2.5 mg) by mouth at onset of headache for migraine (repeat in 4 hours if needed) May repeat in 4 hours. Max 2 tablets/24 hours., Disp: 18 tablet, Rfl: 11     omeprazole (PRILOSEC) 40 MG DR capsule, Take 1 capsule (40 mg) by mouth daily, Disp: 90 capsule, Rfl: 3     order for DME, Equipment being ordered: digital Blood pressure apparatus, Disp: 1 Box, Rfl: 0     polyethylene glycol (GOLYTELY) 236 g suspension, The night before the exam at 6 pm drink an 8-ounce glass every 15 minutes until the jug is half empty. If you arrive before 11 AM: Drink the other half of the Golytely jug at 11 PM night before procedure. If you arrive after 11 AM: Drink the other half of the Golytely jug at 6 AM day of procedure. For additional instructions refer to your colonoscopy prep instructions., Disp: 4000 mL, Rfl: 0     VITAMIN D PO, Take 1,000 Units by mouth daily Pt takes 2 tabs daily in the AM, Disp: , Rfl:     Current Facility-Administered Medications:      Botulinum Toxin Type A (BOTOX) 200 units injection 150 Units, 150 Units, Intramuscular, See Admin Instructions, Sol Linares MD, 150 Units at 11/21/22 1428     Botulinum Toxin Type A (BOTOX) 200 units injection 150 Units, 150 Units, Intramuscular, See Admin Instructions, Sol Linares MD, 150 Units at 08/22/22 1306     Botulinum Toxin Type A (BOTOX) 200 units injection 155 Units, 155 Units, Intramuscular, See Admin Instructions, Sol Linares MD    SOCIAL HISTORY:  Social History     Socioeconomic History     Marital status: Single     Spouse name: Not on file     Number of children: Not on file     Years of education: Not on file     Highest education level: Not on file   Occupational History     Not on file   Tobacco  Use     Smoking status: Never     Smokeless tobacco: Never   Substance and Sexual Activity     Alcohol use: Not Currently     Alcohol/week: 0.0 standard drinks     Comment: 2-3x week     Drug use: No     Sexual activity: Yes     Partners: Female     Birth control/protection: Pill   Other Topics Concern     Parent/sibling w/ CABG, MI or angioplasty before 65F 55M? No   Social History Narrative    Nonsmoker.     Social Determinants of Health     Financial Resource Strain: Not on file   Food Insecurity: Not on file   Transportation Needs: Not on file   Physical Activity: Not on file   Stress: Not on file   Social Connections: Not on file   Intimate Partner Violence: Not on file   Housing Stability: Not on file       FAMILY HISTORY:  No family history of colon cancer, stomach cancer, or esophageal cancer.  Family History   Problem Relation Age of Onset     Family History Negative Mother      Diabetes Mother         Gestational Diabetes 1969 (no late onset)     Hypertension Mother         Controlled by meds     Hyperlipidemia Mother         Controlled by meds     Other Cancer Mother         Basal cell skin cancers     Anesthesia Reaction Mother      Osteoporosis Mother         Osteopenia     Obesity Mother         Unsure if overweight or obese     Blood Disease Father 65        VTE, on coumadin     Hypertension Father         Controlled by meds     Hyperlipidemia Father         Controlled by meds     Other Cancer Father         Benign tumor on pituitary gland     Genetic Disorder Father         Mesenteric vein thrombosis - genetic. Test showed antithrombin 3 deficiency. Deep vein thrombosis, controlled by warfarin     Obesity Father         Unsure if overweight or obese     Diabetes Maternal Grandmother         Diabetes     Hypertension Maternal Grandmother      Breast Cancer Maternal Grandmother      Other Cancer Maternal Grandmother         Melanoma, Basal cell skin cancer     Genetic Disorder Maternal Grandmother          Parkinson's disease (don't know if genetic)     Coronary Artery Disease Maternal Grandfather         Angina, heart attack     Genetic Disorder Maternal Grandfather         Tata Gehrig's disease     Thyroid Disease Maternal Grandfather         Hypothyroidism     Coronary Artery Disease Paternal Grandfather         Bradycardia     Osteoporosis Paternal Grandfather      Genetic Disorder Paternal Grandfather         Blood clots - don't know if genetic     Hypertension Paternal Grandmother      Cerebrovascular Disease Paternal Grandmother      Osteoporosis Paternal Grandmother      Genetic Disorder Paternal Grandmother         Blood clot - don't know if genetic     Anxiety Disorder Sister      Obesity Sister        PHYSICAL EXAMINATION:  Constitutional: aaox3, cooperative, pleasant, not dyspneic/diaphoretic, no acute distress  Vitals reviewed: There were no vitals taken for this visit.  Wt:   Wt Readings from Last 2 Encounters:   02/17/23 120.2 kg (265 lb)   11/22/22 123.7 kg (272 lb 9.6 oz)      Eyes: Sclera anicteric/injected  Ears/nose/mouth/throat: mucus membranes moist, hearing intact  CV: No edema  Respiratory: Unlabored breathing  Abd: Nondistended  Skin: warm, perfused, no jaundice  Psych: Normal affect  MSK: Normal gait    PERTINENT STUDIES:  US Abdomen: 6/24/2022:   1. Hepatic steatosis.  2. Indeterminate hypoechoic subcentimeter focus in the left lobe of  liver, possibly a mildly complex cyst or hemangioma. Consider follow  up ultrasound if clinically indicated.  3. Unremarkable gallbladder.    Attestation signed by Mirian Diop MD at 3/3/2023  4:34 PM:  I performed a history and physical examination of the above patient and discussed the management with Dr. Padilla on 2/28/2023. I reviewed the note and there are no changes to the past medical, family or social history.  A complete 10 point review of systems was obtained. Please see the HPI for pertinent positives and negatives. All other systems  were reviewed and were found to be negative.     I agree with the documented findings and plan of care as outlined.    Mirian Diop MD  GI Attending  Pager: [phone]        Sincerely,    Sushant Padilla MD

## 2023-02-28 NOTE — PATIENT INSTRUCTIONS
- Discuss with your neurologist regarding compazine  - Stop omeprazole   - We will have our pharmacist review your medications to determine whether any may be the cause of your nausea   - Try abram for nausea   - You will be contacted regarding scheduling your MRI

## 2023-02-28 NOTE — NURSING NOTE
"Chief Complaint   Patient presents with     Follow Up       Vitals:    02/28/23 1504   BP: 117/84   BP Location: Left leg   Patient Position: Sitting   Cuff Size: Adult Large   Pulse: 83   SpO2: 99%   Weight: 123.1 kg (271 lb 6.4 oz)   Height: 1.816 m (5' 11.5\")       Body mass index is 37.33 kg/m .    [employer]    "

## 2023-03-02 DIAGNOSIS — G43.709 CHRONIC MIGRAINE WITHOUT AURA WITHOUT STATUS MIGRAINOSUS, NOT INTRACTABLE: Primary | ICD-10-CM

## 2023-03-02 RX ORDER — PROCHLORPERAZINE MALEATE 10 MG
10 TABLET ORAL EVERY 6 HOURS PRN
Qty: 10 TABLET | Refills: 11 | Status: SHIPPED | OUTPATIENT
Start: 2023-03-02 | End: 2023-03-22

## 2023-03-17 ENCOUNTER — VIRTUAL VISIT (OUTPATIENT)
Dept: SLEEP MEDICINE | Facility: CLINIC | Age: 38
End: 2023-03-17
Payer: COMMERCIAL

## 2023-03-17 VITALS — HEIGHT: 72 IN | WEIGHT: 260 LBS | BODY MASS INDEX: 35.21 KG/M2

## 2023-03-17 DIAGNOSIS — E66.9 OBESITY (BMI 30-39.9): ICD-10-CM

## 2023-03-17 DIAGNOSIS — R06.81 WITNESSED APNEIC SPELLS: ICD-10-CM

## 2023-03-17 DIAGNOSIS — R06.83 SNORING: ICD-10-CM

## 2023-03-17 DIAGNOSIS — G47.00 INSOMNIA, UNSPECIFIED TYPE: ICD-10-CM

## 2023-03-17 DIAGNOSIS — G47.9 SLEEP DISTURBANCE: Primary | ICD-10-CM

## 2023-03-17 PROCEDURE — 99205 OFFICE O/P NEW HI 60 MIN: CPT | Mod: VID | Performed by: NURSE PRACTITIONER

## 2023-03-17 ASSESSMENT — PAIN SCALES - GENERAL: PAINLEVEL: NO PAIN (0)

## 2023-03-17 ASSESSMENT — SLEEP AND FATIGUE QUESTIONNAIRES
HOW LIKELY ARE YOU TO NOD OFF OR FALL ASLEEP WHILE SITTING QUIETLY AFTER LUNCH WITHOUT ALCOHOL: WOULD NEVER DOZE
HOW LIKELY ARE YOU TO NOD OFF OR FALL ASLEEP WHILE LYING DOWN TO REST IN THE AFTERNOON WHEN CIRCUMSTANCES PERMIT: SLIGHT CHANCE OF DOZING
HOW LIKELY ARE YOU TO NOD OFF OR FALL ASLEEP WHILE SITTING AND READING: SLIGHT CHANCE OF DOZING
HOW LIKELY ARE YOU TO NOD OFF OR FALL ASLEEP WHILE SITTING INACTIVE IN A PUBLIC PLACE: WOULD NEVER DOZE
HOW LIKELY ARE YOU TO NOD OFF OR FALL ASLEEP WHILE WATCHING TV: WOULD NEVER DOZE
HOW LIKELY ARE YOU TO NOD OFF OR FALL ASLEEP WHEN YOU ARE A PASSENGER IN A CAR FOR AN HOUR WITHOUT A BREAK: MODERATE CHANCE OF DOZING
HOW LIKELY ARE YOU TO NOD OFF OR FALL ASLEEP IN A CAR, WHILE STOPPED FOR A FEW MINUTES IN TRAFFIC: WOULD NEVER DOZE
HOW LIKELY ARE YOU TO NOD OFF OR FALL ASLEEP WHILE SITTING AND TALKING TO SOMEONE: WOULD NEVER DOZE

## 2023-03-17 NOTE — PROGRESS NOTES
Video-Visit Details    Type of service:  Video Visit    Video Start Time (time video started): 1:36 PM    Video End Time (time video stopped): 2:21 PM    Originating Location (pt. Location): Home        Distant Location (provider location):  Off-site    Mode of Communication:  Video Conference via [facility]        Outpatient Sleep Medicine Consultation:      Name: Eliezer Ellis MRN# [id number]   Age: 38 year old YOB: 1985     Date of Consultation: March 17, 2023  Consultation is requested by: Ursula Huang MD  [address] [place] [address] Ursula Huang  Primary care provider: Ursula Huang       Reason for Sleep Consult:     Eliezer Ellis is sent by Ursula Huang for a sleep consultation regarding snoring, possible JAVID.    Patient s Reason for visit  Eliezer Ellis main reason for visit:  Snoring, possible JAVID  Patient states problem(s) started:  more noticeable over the last year  Eliezer DAYO Rhonda's goals for this visit:             Assessment and Plan:     Summary Sleep Diagnoses:  1. Sleep disturbance  2. Snoring  3. Witnessed apneic spells  4. Insomnia, unspecified type  5. Obesity (BMI 30-39.9)  - Adult Sleep Eval & Management  Referral  - HST-Home Sleep Apnea Test - Noxturnal Returnable; Future      Comorbid Diagnoses:  1.  Chronic daily headache/migraine  2.  Prehypertension  3.  Clinical hypothyroidism  4.  GERD  5.  Gout  6.  Major depression  7.  Anxiety  8.  Asthma  9.  Obesity      Summary Recommendations:  1.Recommend further evaluation with home sleep apnea testing (HST) for possible sleep disordered breathing.  His STOP-BANG score is 5-6 which suggests a high risk of JAVID.  He has a high pretest probability of JAVID with symptoms of loud snoring, witnessed apneas, difficulty falling/staying asleep, and poorly restful sleep for several years.  2.  Follow-up in approximately 2 weeks after the sleep study to review the results and to determine next  steps.    Orders Placed This Encounter   Procedures     HST-Home Sleep Apnea Test - Noxturnal Returnable         Summary Counseling:    Sleep Testing Reviewed  Obstructive Sleep Apnea Reviewed  Complications of Untreated Sleep Apnea Reviewed  Previous recent chart notes lab and imaging results reviewed    Medical Decision-making:   Educational materials provided in instructions    Total time spent reviewing medical records, history and physical examination, review of previous testing and interpretation as well as documentation on this date: 60 minutes    CC: Ursula Huang          History of Present Illness:     Eliezer Ellis is a 38-year-old male with a PMH pertinent for chronic daily headache/migraine, prehypertension, subclinical hypothyroidism, asthma, HLD, GERD, gout, major depression, anxiety, and obesity who presents today with symptoms of loud snoring, snorts self awake at times, difficulty breathing with supine sleep, some difficulty falling asleep at times, and not so well rested sleep for several years.  This patient was referred by his primary care provider for further evaluation of possible sleep disordered breathing.    Past Sleep Evaluations: No    SLEEP-WAKE SCHEDULE:     Work/School Days: Patient goes to school/work:  Yes, day hours (10-6)   Usually gets into bed at  11 PM  Takes patient about  20-30 minutes to fall asleep  Has trouble falling asleep  0-1 nights per week  Wakes up in the middle of the night  2-3 times.  Wakes up due to  bathroom  He has trouble falling back asleep  0-1 times a week.   It usually takes  5 minutes to get back to sleep  Patient is usually up at  9 AM  Uses alarm:  Yes    Weekends/Non-work Days/All Other Days:  Usually gets into bed at   11 PM - 12 AM  Takes patient about  20-30 minutes to fall asleep  Patient is usually up at  9 AM  Uses alarm:  Varies    Sleep Need  Patient gets   8-8.5 hours sleep on average   Patient thinks he needs about  8.5-9 hours  sleep    Eliezer Ellis prefers to sleep in this position(s):  Side, back   Patient states they do the following activities in bed:      Naps  Patient takes a purposeful nap  0 times a week and naps are usually  0 in duration  He feels better after a nap:    He dozes off unintentionally  0 days per week  Patient has had a driving accident or near-miss due to sleepiness/drowsiness:  No      SLEEP DISRUPTIONS:    Breathing/Snoring  Patient snores: Yes, loud snoring  Other people complain about his snoring:  Yes  Patient has been told he stops breathing in his sleep: Yes - occasional  He has issues with the following:  Denies difficulty breathing through nose    Movement:  Patient gets pain, discomfort, with an urge to move:   No  It happens when he is resting:     It happens more at night:     Patient has been told he kicks his legs at night:   No     Behaviors in Sleep:  Eliezer Ellis has experienced the following behaviors while sleeping:  Denies walking/talking in sleep, dream enactment, hypnagogy, and sleep paralysis  He has experienced sudden muscle weakness during the day:  Denies cataplexy      Is there anything else you would like your sleep provider to know:        CAFFEINE AND OTHER SUBSTANCES:    Patient consumes caffeinated beverages per day:   1-2  Last caffeine use is usually:  AM to early afternoon  List of any prescribed or over the counter stimulants that patient takes:  None  List of any prescribed or over the counter sleep medication patient takes:  None  List of previous sleep medications that patient has tried:  Melatonin  Patient drinks alcohol to help them sleep:  No  Patient drinks alcohol near bedtime:   No    Alcohol use: None  Nicotine/tobacco use: None  Recreational drug use: None      Family History:  Patient has a family member been diagnosed with a sleep disorder:  Yes, mom with JAVID on CPAP            SCALES:    EPWORTH SLEEPINESS SCALE      [place] Sleepiness Scale ( M.W. Johns   1990-1997[facility] [place]/English - Final version - 21 Nov 07 - [facility].) 3/17/2023   Sitting and reading Slight chance of dozing   Watching TV Would never doze   Sitting, inactive in a public place (e.g. a theatre or a meeting) Would never doze   As a passenger in a car for an hour without a break Moderate chance of dozing   Lying down to rest in the afternoon when circumstances permit Slight chance of dozing   Sitting and talking to someone Would never doze   Sitting quietly after a lunch without alcohol Would never doze   In a car, while stopped for a few minutes in traffic Would never doze   [place] Score (MC) 4   [place] Score (Sleep) 4         INSOMNIA SEVERITY INDEX (MARY)      Insomnia Severity Index (MARY) 3/17/2023   Difficulty falling asleep 1   Difficulty staying asleep 1   Problems waking up too early 1   How SATISFIED/DISSATISFIED are you with your CURRENT sleep pattern? 2   How NOTICEABLE to others do you think your sleep problem is in terms of impairing the quality of your life? 1   How WORRIED/DISTRESSED are you about your current sleep problem? 1   To what extent do you consider your sleep problem to INTERFERE with your daily functioning (e.g. daytime fatigue, mood, ability to function at work/daily chores, concentration, memory, mood, etc.) CURRENTLY? 1   MARY Total Score 8       Guidelines for Scoring/Interpretation:  Total score categories:  0-7 = No clinically significant insomnia   8-14 = Subthreshold insomnia   15-21 = Clinical insomnia (moderate severity)  22-28 = Clinical insomnia (severe)  Used via courtesy of www.Spotivateth.va.gov with permission from Sushil Newton PhD., [facility]      STOP BANG     STOP BANG Questionnaire (  2008, the American Society of Anesthesiologists, Inc. [employer] 3/17/2023   B/P Clinic: -   BMI Clinic: 35.26         GAD7    RICHARD-7  11/14/2022   1. Feeling nervous, anxious, or on edge 1   2. Not being able to stop or control  "worrying 1   3. Worrying too much about different things 0   4. Trouble relaxing 1   5. Being so restless that it is hard to sit still 0   6. Becoming easily annoyed or irritable 1   7. Feeling afraid, as if something awful might happen 0   RICHARD-7 Total Score 4   If you checked any problems, how difficult have they made it for you to do your work, take care of things at home, or get along with other people? Somewhat difficult         CAGE-AID    No flowsheet data found.    CAGE-AID reprinted with permission from the [place] Medical Journal, ANNA Ramirez. and BRIANNE Russo, \"Conjoint screening questionnaires for alcohol and drug abuse\" [place] Medical Journal 94: 135-140, 1995.      PATIENT HEALTH QUESTIONNAIRE-9 (PHQ - 9)    PHQ-9 ([employer]) 11/14/2022   1.  Little interest or pleasure in doing things 0   2.  Feeling down, depressed, or hopeless 1   3.  Trouble falling or staying asleep, or sleeping too much 1   4.  Feeling tired or having little energy 1   5.  Poor appetite or overeating 0   6.  Feeling bad about yourself 0   7.  Trouble concentrating 0   8.  Moving slowly or restless 0   9.  Suicidal or self-harm thoughts 0   PHQ-9 Total Score 3   Difficulty at work, home, or with people -   1.  Little interest or pleasure in doing things Not at all   2.  Feeling down, depressed, or hopeless Several days   3.  Trouble falling or staying asleep, or sleeping too much Several days   4.  Feeling tired or having little energy Several days   5.  Poor appetite or overeating Not at all   6.  Feeling bad about yourself Not at all   7.  Trouble concentrating Not at all   8.  Moving slowly or restless Not at all   9.  Suicidal or self-harm thoughts Not at all   PHQ-9 via [employer] TOTAL SCORE-----> 3 (Minimal depression)   Difficulty at work, home, or with people Not difficult at all       Developed by Jagdeep Reyes, Hannah Roger, Deangelo Denney and colleagues, with an educational david from [employer]. No permission " required to reproduce, translate, display or distribute.        Allergies:    Allergies   Allergen Reactions     Dust Mites Shortness Of Breath     Mold Shortness Of Breath     Cats Itching     Other Food Allergy Difficulty breathing, Headache and Visual Disturbance     Pcn [Penicillins]      Seasonal Allergies        Medications:    Current Outpatient Medications   Medication Sig Dispense Refill     allopurinol (ZYLOPRIM) 100 MG tablet Take 1 tablet (100 mg) by mouth daily 90 tablet 3     ALPRAZolam (XANAX) 1 MG tablet Take 1 tablet (1 mg) by mouth once as needed for anxiety 15 tablet 0     cetirizine (ZYRTEC) 10 MG tablet Take 10 mg by mouth daily.       desvenlafaxine (PRISTIQ) 50 MG 24 hr tablet Take 1 tablet (50 mg) by mouth daily 90 tablet 3     fluocinonide (LIDEX) 0.05 % external gel Apply topically 2 times daily 30 g 1     naratriptan (AMERGE) 2.5 MG tablet Take 1 tablet (2.5 mg) by mouth at onset of headache for migraine (repeat in 4 hours if needed) May repeat in 4 hours. Max 2 tablets/24 hours. 18 tablet 11     omeprazole (PRILOSEC) 40 MG DR capsule Take 1 capsule (40 mg) by mouth daily 90 capsule 3     order for DME Equipment being ordered: digital Blood pressure apparatus 1 Box 0     prochlorperazine (COMPAZINE) 10 MG tablet Take 1 tablet (10 mg) by mouth every 6 hours as needed for nausea or vomiting (migraine) 10 tablet 11     VITAMIN D PO Take 1,000 Units by mouth daily Pt takes 2 tabs daily in the AM       albuterol (PROAIR HFA/PROVENTIL HFA/VENTOLIN HFA) 108 (90 Base) MCG/ACT inhaler Inhale 1-2 puffs into the lungs every 4 hours as needed for shortness of breath / dyspnea or wheezing (Patient not taking: Reported on 2/28/2023) 18 g 0       Problem List:  Patient Active Problem List    Diagnosis Date Noted     Rib pain on right side 12/02/2022     Priority: Medium     Reactive depression 09/20/2022     Priority: Medium     Morbid obesity (H) 06/13/2022     Priority: Medium     Weight gain  "2022     Priority: Medium     Gastroesophageal reflux disease with esophagitis, unspecified whether hemorrhage 2022     Priority: Medium     Nausea 2022     Priority: Medium     Chronic migraine without aura without status migrainosus, not intractable 2022     Priority: Medium     History of canker sores 2022     Priority: Medium     Chronic daily headache 2021     Priority: Medium     Migraine equivalent 2021     Priority: Medium     Moderate episode of recurrent major depressive disorder (H) 2019     Priority: Medium     Encounter for pharmacogenetic testing 2018     Priority: Medium     [employer] testing completed 18  CY: -163C>A - C/A, 5347C>T - C/T - Normal  CYP2B6 *1/*6 - intermediate  NBD3O62 *1/*17 - normal  CYP2C9 *1/*3 - Intermediate  CY *1/*1 - normal  CYP2D6 *10/*41 - Poor  UGT1A4 *1/*1 - normal  BBA7X12*2/*2 - normal  MTHFR - normal  SLC6A4 - L/S - intermediate  HTR2A - G/G - increased sensitivity  HLA-A*3101 and HLA-B*1502 - lower risk  COMT - MET/MET - reduced activity  ADRA2A - C/C - reduced response         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 continue counsleing       Anxiety 2013     Priority: Medium     Counseling is helping- medications ineffective  SSRI-lexapro, prozac- side effects,wellbutrin- caused anxiety attack,vybrid- \"weird: side effect  Saw-Pyschaitrist- 2017,Stopped effexor as well on own - not helping       Mixed hyperlipidemia 2013     Priority: Medium     Prehypertension 2013     Priority: Medium     CARDIOVASCULAR SCREENING; LDL GOAL LESS THAN 160 10/19/2012     Priority: Medium     Onychomycosis 2012     Priority: Medium        Past Medical/Surgical History:  Past Medical History:   Diagnosis Date     Anxiety      Anxiety 2013     " Anxiety attack 11/1/2013     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 7/10/2017     Hyperlipidemia LDL goal <160 11/1/2013     Intermittent asthma 9/29/2011    reports no active problems     Seasonal allergies 9/29/2011     Past Surgical History:   Procedure Laterality Date     COLONOSCOPY N/A 2/17/2023    Procedure: COLONOSCOPY;  Surgeon: Jeremy Phillips MD;  [place] [facility] [place]     ESOPHAGOSCOPY, GASTROSCOPY, DUODENOSCOPY (EGD), COMBINED N/A 2/17/2023    Procedure: ESOPHAGOGASTRODUODENOSCOPY, WITH BIOPSY;  Surgeon: Jeremy Phillips MD;  [place] [facility] [place]       Social History:  Social History     Socioeconomic History     Marital status: Single     Spouse name: Not on file     Number of children: Not on file     Years of education: Not on file     Highest education level: Not on file   Occupational History     Not on file   Tobacco Use     Smoking status: Never     Smokeless tobacco: Never   Substance and Sexual Activity     Alcohol use: Not Currently     Alcohol/week: 0.0 standard drinks     Comment: 2-3x week     Drug use: No     Sexual activity: Yes     Partners: Female     Birth control/protection: Pill   Other Topics Concern     Parent/sibling w/ CABG, MI or angioplasty before 65F 55M? No   Social History Narrative    Nonsmoker.     Social Determinants of Health     Financial Resource Strain: Not on file   Food Insecurity: Not on file   Transportation Needs: Not on file   Physical Activity: Not on file   Stress: Not on file   Social Connections: Not on file   Intimate Partner Violence: Not on file   Housing Stability: Not on file       Family History:  Family History   Problem Relation Age of Onset     Family History Negative Mother      Diabetes Mother         Gestational Diabetes 1969 (no late onset)     Hypertension Mother         Controlled by meds     Hyperlipidemia Mother         Controlled by meds     Other Cancer Mother         Basal cell skin cancers      Anesthesia Reaction Mother      Osteoporosis Mother         Osteopenia     Obesity Mother         Unsure if overweight or obese     Blood Disease Father 65        VTE, on coumadin     Hypertension Father         Controlled by meds     Hyperlipidemia Father         Controlled by meds     Other Cancer Father         Benign tumor on pituitary gland     Genetic Disorder Father         Mesenteric vein thrombosis - genetic. Test showed antithrombin 3 deficiency. Deep vein thrombosis, controlled by warfarin     Obesity Father         Unsure if overweight or obese     Diabetes Maternal Grandmother         Diabetes     Hypertension Maternal Grandmother      Breast Cancer Maternal Grandmother      Other Cancer Maternal Grandmother         Melanoma, Basal cell skin cancer     Genetic Disorder Maternal Grandmother         Parkinson's disease (don't know if genetic)     Coronary Artery Disease Maternal Grandfather         Angina, heart attack     Genetic Disorder Maternal Grandfather         Tata Gehrig's disease     Thyroid Disease Maternal Grandfather         Hypothyroidism     Coronary Artery Disease Paternal Grandfather         Bradycardia     Osteoporosis Paternal Grandfather      Genetic Disorder Paternal Grandfather         Blood clots - don't know if genetic     Hypertension Paternal Grandmother      Cerebrovascular Disease Paternal Grandmother      Osteoporosis Paternal Grandmother      Genetic Disorder Paternal Grandmother         Blood clot - don't know if genetic     Anxiety Disorder Sister      Obesity Sister        Review of Systems:  A complete review of systems reviewed by me is negative with the exeption of what has been mentioned in the history of present illness in the last 2 WEEKS  CONSTITUTIONAL: NEGATIVE for weight gain/loss, fever, chills, sweats or night sweats, drug allergies.  EYES: NEGATIVE for changes in vision, blind spots, double vision.  ENT: NEGATIVE for ear pain, sore throat, sinus pain,  post-nasal drip, runny nose, bloody nose  CARDIAC: NEGATIVE for fast heartbeats or fluttering in chest, chest pain or pressure, breathlessness when lying flat, swollen legs or swollen feet.  NEUROLOGIC: NEGATIVE headaches, weakness or numbness in the arms or legs.  NEUROLOGIC:  POSITIVE for  headaches  DERMATOLOGIC: NEGATIVE for rashes, new moles or change in mole(s)  PULMONARY: NEGATIVE SOB at rest, SOB with activity, dry cough, productive cough, coughing up blood, wheezing or whistling when breathing.    GASTROINTESTINAL: NEGATIVE for nausea or vomitting, loose or watery stools, fat or grease in stools, constipation, abdominal pain, bowel movements black in color or blood noted.  GASTROINTESTINAL:  POSITIVE for  nausea  GENITOURINARY: NEGATIVE for pain during urination, blood in urine, urinating more frequently than usual, irregular menstrual periods.  MUSCULOSKELETAL: NEGATIVE for muscle pain, bone or joint pain, swollen joints.  ENDOCRINE: NEGATIVE for increased thirst or urination, diabetes.  LYMPHATIC: NEGATIVE for swollen lymph nodes, lumps or bumps in the breasts or nipple discharge.      Physical Examination:  Vitals: Ht 1.829 m (6')   Wt 117.9 kg (260 lb)   BMI 35.26 kg/m    BMI= Body mass index is 35.26 kg/m .           GENERAL APPEARANCE: healthy, alert, no distress and cooperative  EYES: Eyes grossly normal to inspection  RESP: Unlabored, easy breathing with normal conversational speech  CV: color normal  NEURO: Alert and oriented x3, mentation intact and speech normal  PSYCH: mentation appears normal and affect normal/bright  Mallampati Class: Unable to examine  Tonsillar Stage: Unable to examine         Data: All pertinent previous laboratory data reviewed     Recent Labs   Lab Test 06/13/22  1105 04/30/21  1042    140   POTASSIUM 4.3 4.2   CHLORIDE 107 109   CO2 26 26   ANIONGAP 7 5   GLC 93 88   BUN 15 15   CR 0.83 0.98   SUZANNE 9.2 8.9       No results for input(s): WBC, RBC, HGB, HCT, MCV,  MCH, MCHC, RDW, PLT in the last 38196 hours.    Recent Labs   Lab Test 06/13/22  1105   PROTTOTAL 7.6   ALBUMIN 4.1   BILITOTAL 0.5   ALKPHOS 89   AST 21   ALT 42       TSH (mU/L)   Date Value   06/13/2022 6.47 (H)   06/14/2021 4.91 (H)   04/30/2021 5.82 (H)       No results found for: UAMP, UBARB, BENZODIAZEUR, UCANN, UCOC, OPIT, UPCP    No results found for: IRONSAT, [id number], COLLINS    No results found for: PH, PHARTERIAL, PO2, ZZ1LESGFZPA, SAT, PCO2, HCO3, BASEEXCESS, ROSCOE, BEB    @LABRCNTIPR(phv:4,pco2v:4,po2v:4,hco3v:4,maggy:4,o2per:4)@    Echocardiology: No results found for this or any previous visit (from the past 4320 hour(s)).    Chest x-ray: No results found for this or any previous visit from the past 365 days.      Chest CT: No results found for this or any previous visit from the past 365 days.      PFT: Most Recent [facility] Pulmonary Function Testing    No results found for: 20001  No results found for: 20002  No results found for: 20003  No results found for: 20015  No results found for: 20016  No results found for: 20027  No results found for: 20028  No results found for: 20029  No results found for: 20079  No results found for: 20080  No results found for: 20081  No results found for: 20335  No results found for: 20105  No results found for: 20053  No results found for: 20054  No results found for: 20055      MARSHAL Handy CNP 3/17/2023   Sleep Medicine      This note was written with the assistance of the Dragon voice-dictation technology software. The final document, although reviewed, may contain errors. For corrections, please contact the office.

## 2023-03-17 NOTE — PATIENT INSTRUCTIONS
"      MY TREATMENT INFORMATION FOR SLEEP APNEA-  Eliezer Ellis    DOCTOR : MARSHAL Handy CNP    Am I having a sleep study at a sleep center?  --->Due to normal delays, you will be contacted within 2-4 weeks to schedule    Am I having a home sleep study?  --->Watch the video for the device you are using:    -/drop off device-   https://www.[employer].com/watch?v=yGGFBdELGhk    -Disposable device sent out require phone/computer application-   https://www.[employer].com/watch?v=BCce_vbiwxE      Frequently asked questions:  1. What is Obstructive Sleep Apnea (JAVID)? JAVID is the most common type of sleep apnea. Apnea means, \"without breath.\"  Apnea is most often caused by narrowing or collapse of the upper airway as muscles relax during sleep.   Almost everyone has occasional apneas. Most people with sleep apnea have had brief interruptions at night frequently for many years.  The severity of sleep apnea is related to how frequent and severe the events are.   2. What are the consequences of JAVID? Symptoms include: feeling sleepy during the day, snoring loudly, gasping or stopping of breathing, trouble sleeping, and occasionally morning headaches or heartburn at night.  Sleepiness can be serious and even increase the risk of falling asleep while driving. Other health consequences may include development of high blood pressure and other cardiovascular disease in persons who are susceptible. Untreated JAVID  can contribute to heart disease, stroke and diabetes.   3. What are the treatment options? In most situations, sleep apnea is a lifelong disease that must be managed with daily therapy. Medications are not effective for sleep apnea and surgery is generally not considered until other therapies have been tried. Your treatment is your choice . Continuous Positive Airway (CPAP) works right away and is the therapy that is effective in nearly everyone. An oral device to hold your jaw forward is usually the next most " reliable option. Other options include postioning devices (to keep you off your back), weight loss, and surgery including a tongue pacing device. There is more detail about some of these options below.  4. Are my sleep studies covered by insurance? Although we will request verification of coverage, we advise you also check in advance of the study to ensure there is coverage.    Important tips for those choosing CPAP and similar devices   Know your equipment:  CPAP is continuous positive airway pressure that prevents obstructive sleep apnea by keeping the throat from collapsing while you are sleeping. In most cases, the device is  smart  and can slowly self-adjusts if your throat collapses and keeps a record every day of how well you are treated-this information is available to you and your care team.  BPAP is bilevel positive airway pressure that keeps your throat open and also assists each breath with a pressure boost to maintain adequate breathing.  Special kinds of BPAP are used in patients who have inadequate breathing from lung or heart disease. In most cases, the device is  smart  and can slowly self-adjusts to assist breathing. Like CPAP, the device keeps a record of how well you are treated.  Your mask is your connection to the device. You get to choose what feels most comfortable and the staff will help to make sure if fits. Here: are some examples of the different masks that are available:       Key points to remember on your journey with sleep apnea:  Sleep study.  PAP devices often need to be adjusted during a sleep study to show that they are effective and adjusted right.  Good tips to remember: Try wearing just the mask during a quiet time during the day so your body adapts to wearing it. A humidifier is recommended for comfort in most cases to prevent drying of your nose and throat. Allergy medication from your provider may help you if you are having nasal congestion.  Getting settled-in. It takes  more than one night for most of us to get used to wearing a mask. Try wearing just the mask during a quiet time during the day so your body adapts to wearing it. A humidifier is recommended for comfort in most cases. Our team will work with you carefully on the first day and will be in contact within 4 days and again at 2 and 4 weeks for advice and remote device adjustments. Your therapy is evaluated by the device each day.   Use it every night. The more you are able to sleep naturally for 7-8 hours, the more likely you will have good sleep and to prevent health risks or symptoms from sleep apnea. Even if you use it 4 hours it helps. Occasionally all of us are unable to use a medical therapy, in sleep apnea, it is not dangerous to miss one night.   Communicate. Call our skilled team on the number provided on the first day if your visit for problems that make it difficult to wear the device. Over 2 out of 3 patients can learn to wear the device long-term with help from our team. Remember to call our team or your sleep providers if you are unable to wear the device as we may have other solutions for those who cannot adapt to mask CPAP therapy. It is recommended that you sleep your sleep provider within the first 3 months and yearly after that if you are not having problems.   Use it for your health. We encourage use of CPAP masks during daytime quiet periods to allow your face and brain to adapt to the sensation of CPAP so that it will be a more natural sensation to awaken to at night or during naps. This can be very useful during the first few weeks or months of adapting to CPAP though it does not help medically to wear CPAP during wakefulness and  should not be used as a strategy just to meet guidelines.  Take care of your equipment. Make sure you clean your mask and tubing using directions every day and that your filter and mask are replaced as recommended or if they are not working.     BESIDES CPAP, WHAT OTHER  THERAPIES ARE THERE?    Positioning Device  Positioning devices are generally used when sleep apnea is mild and only occurs on your back.This example shows a pillow that straps around the waist. It may be appropriate for those whose sleep study shows milder sleep apnea that occurs primarily when lying flat on one's back. Preliminary studies have shown benefit but effectiveness at home may need to be verified by a home sleep test. These devices are generally not covered by medical insurance.  Examples of devices that maintain sleeping on the back to prevent snoring and mild sleep apnea.    Belt type body positioner  http://[employer]/    Electronic reminder  http://nightshifttherapy.com/            Oral Appliance  What is oral appliance therapy?  An oral appliance device fits on your teeth at night like a retainer used after having braces. The device is made by a specialized dentist and requires several visits over 1-2 months before a manufactured device is made to fit your teeth and is adjusted to prevent your sleep apnea. Once an oral device is working properly, snoring should be improved. A home sleep test may be recommended at that time if to determine whether the sleep apnea is adequately treated.       Some things to remember:  -Oral devices are often, but not always, covered by your medical insurance. Be sure to check with your insurance provider.   -If you are referred for oral therapy, you will be given a list of specialized dentists to consider or you may choose to visit the Web site of the American Academy of Dental Sleep Medicine  -Oral devices are less likely to work if you have severe sleep apnea or are extremely overweight.     More detailed information  An oral appliance is a small acrylic device that fits over the upper and lower teeth  (similar to a retainer or a mouth guard). This device slightly moves jaw forward, which moves the base of the tongue forward, opens the airway, improves breathing  for effective treat snoring and obstructive sleep apnea in perhaps 7 out of 10 people .  The best working devices are custom-made by a dental device  after a mold is made of the teeth 1, 2, 3.  When is an oral appliance indicated?  Oral appliance therapy is recommended as a first-line treatment for patients with primary snoring, mild sleep apnea, and for patients with moderate sleep apnea who prefer appliance therapy to use of CPAP4, 5. Severity of sleep apnea is determined by sleep testing and is based on the number of respiratory events per hour of sleep.   How successful is oral appliance therapy?  The success rate of oral appliance therapy in patients with mild sleep apnea is 75-80% while in patients with moderate sleep apnea it is 50-70%. The chance of success in patients with severe sleep apnea is 40-50%. The research also shows that oral appliances have a beneficial effect on the cardiovascular health of JAVID patients at the same magnitude as CPAP therapy7.  Oral appliances should be a second-line treatment in cases of severe sleep apnea, but if not completely successful then a combination therapy utilizing CPAP plus oral appliance therapy may be effective. Oral appliances tend to be effective in a broad range of patients although studies show that the patients who have the highest success are females, younger patients, those with milder disease, and less severe obesity. 3, 6.   Finding a dentist that practices dental sleep medicine  Specific training is available through the American Academy of Dental Sleep Medicine for dentists interested in working in the field of sleep. To find a dentist who is educated in the field of sleep and the use of oral appliances, near you, visit the Web site of the American Academy of Dental Sleep Medicine.    References  1. Girish et al. Objectively measured vs self-reported compliance during oral appliance therapy for sleep-disordered breathing. Chest 2013;  144(5): 0004-6739.  2. Lupe et al. Objective measurement of compliance during oral appliance therapy for sleep-disordered breathing. Thorax 2013; 68(1): 91-96.  3. Nadia et al. Mandibular advancement devices in 620 men and women with JAVID and snoring: tolerability and predictors of treatment success. Chest 2004; 125: 9652-8785.  4. Mario, et al. Oral appliances for snoring and JAVID: a review. Sleep 2006; 29: 244-262.  5. Salima et al. Oral appliance treatment for JAVID: an update. J Clin Sleep Med 2014; 10(2): 215-227.  6. Lit et al. Predictors of OSAH treatment outcome. J Dent Res 2007; 86: 1685-5567.      Weight Loss:    Weight loss is a long-term strategy that may improve sleep apnea in some patients.    Weight management is a personal decision and the decision should be based on your interest and the potential benefits.  If you are interested in exploring weight loss strategies, the following discussion covers the impact on weight loss on sleep apnea and the approaches that may be successful.    Being overweight does not necessarily mean you will have health consequences.  Those who have BMI over 35 or over 27 with existing medical conditions carries greater risk.   Weight loss decreases severity of sleep apnea in most people with obesity. For those with mild obesity who have developed snoring with weight gain, even 15-30 pound weight loss can improve and occasionally eliminate sleep apnea.  Structured and life-long dietary and health habits are necessary to lose weight and keep healthier weight levels.     Though there may be significant health benefits from weight loss, long-term weight loss is very difficult to achieve- studies show success with dietary management in less than 10% of people. In addition, substantial weight loss may require years of dietary control and may be difficult if patients have severe obesity. In these cases, surgical management may be considered.  Finally, older  individuals who have tolerated obesity without health complications may be less likely to benefit from weight loss strategies.      Your BMI is Body mass index is 35.26 kg/m .    What is BMI?  Body mass index (BMI) is one way to tell whether you are at a healthy weight, overweight, or obese. It measures your weight in relation to your height.  A BMI of 18.5 to 24.9 is in the healthy range. A person with a BMI of 25 to 29.9 is considered overweight, and someone with a BMI of 30 or greater is considered obese.  Another way to find out if you are at risk for health problems caused by overweight and obesity is to measure your waist. If you are a woman and your waist is more than 35 inches, or if you are a man and your waist is more than 40 inches, your risk of disease may be higher.  More than two-thirds of American adults are considered overweight or obese. Being overweight or obese increases the risk for further weight gain.  Excess weight may lead to heart disease and diabetes. Creating and following plans for healthy eating and physical activity may help you improve your health.    Methods for maintaining or losing weight.  Weight control is part of healthy lifestyle and includes exercise, emotional health, and healthy eating habits.  Careful eating habits lifelong is the mainstay of weight control.  Though there are significant health benefits from weight loss, long-term weight loss with diet alone may be very difficult to achieve- studies show long-term success with dietary management in less than 10% of people. Attaining a healthy weight may be especially difficult to achieve in those with severe obesity. In some cases, medications, devices and surgical management might be considered.    What can you do?  If you are overweight or obese and are interested in methods for weight loss, you should discuss this with your provider. In addition, we recommend that you review healthy life styles and methods for weight loss  available through the National Institutes of Health patient information sites:   http://win.niddk.nih.gov/publications/index.htm    Surgery:    Surgery for obstructive sleep apnea is considered generally only when other therapies fail to work. Surgery may be discussed with you if you are having a difficult time tolerating CPAP and or when there is an abnormal structure that requires surgical correction.  Nose and throat surgeries often enlarge the airway to prevent collapse.  Most of these surgeries create pain for 1-2 weeks and up to half of the most common surgeries are not effective throughout life.  You should carefully discuss the benefits and drawbacks to surgery with your sleep provider and surgeon to determine if it is the best solution for you.   More information  Surgery for JAVID is directed at areas that are responsible for narrowing or complete obstruction of the airway during sleep.  There are a wide range of procedures available to enlarge and/or stabilize the airway to prevent blockage of breathing in the three major areas where it can occur: the palate, tongue, and nasal regions.  Successful surgical treatment depends on the accurate identification of the factors responsible for obstructive sleep apnea in each person.  A personalized approach is required because there is no single treatment that works well for everyone.  Because of anatomic variation, consultation with an examination by a sleep surgeon is a critical first step in determining what surgical options are best for each patient.  In some cases, examination during sedation may be recommended in order to guide the selection of procedures.  Patients will be counseled about risks and benefits as well as the typical recovery course after surgery. Surgery is typically not a cure for a person s JAVID.  However, surgery will often significantly improve one s JAVID severity (termed  success rate ).  Even in the absence of a cure, surgery will decrease  the cardiovascular risk associated with OSA7; improve overall quality of life8 (sleepiness, functionality, sleep quality, etc).      Palate Procedures:  Patients with JAVID often have narrowing of their airway in the region of their tonsils and uvula.  The goals of palate procedures are to widen the airway in this region as well as to help the tissues resist collapse.  Modern palate procedure techniques focus on tissue conservation and soft tissue rearrangement, rather than tissue removal.  Often the uvula is preserved in this procedure. Residual sleep apnea is common in patient after pharyngoplasty with an average reduction in sleep apnea events of 33%2.      Tongue Procedures:  ExamWhile patients are awake, the muscles that surround the throat are active and keep this region open for breathing. These muscles relax during sleep, allowing the tongue and other structures to collapse and block breathing.  There are several different tongue procedures available.  Selection of a tongue base procedure depends on characteristics seen on physical exam.  Generally, procedures are aimed at removing bulky tissues in this area or preventing the back of the tongue from falling back during sleep.  Success rates for tongue surgery range from 50-62%3.    Hypoglossal Nerve Stimulation:  Hypoglossal nerve stimulation has recently received approval from the [place] Food and Drug Administration for the treatment of obstructive sleep apnea.  This is based on research showing that the system was safe and effective in treating sleep apnea6.  Results showed that the median AHI score decreased 68%, from 29.3 to 9.0. This therapy uses an implant system that senses breathing patterns and delivers mild stimulation to airway muscles, which keeps the airway open during sleep.  The system consists of three fully implanted components: a small generator (similar in size to a pacemaker), a breathing sensor, and a stimulation lead.  Using a  small handheld remote, a patient turns the therapy on before bed and off upon awakening.    Candidates for this device must be greater than 18 years of age, have moderate to severe JAVID (AHI between 15-65), BMI less than 35, have tried CPAP/oral appliance for at least 8 weeks without success, and have appropriate upper airway anatomy (determined by a sleep endoscopy performed by Dr. Aime Peralta).    Hypoglossal Nerve Stimulation Pathway:    The sleep surgeon s office will work with the patient through the insurance prior-authorization process (including communications and appeals).    Nasal Procedures:  Nasal obstruction can interfere with nasal breathing during the day and night.  Studies have shown that relief of nasal obstruction can improve the ability of some patients to tolerate positive airway pressure therapy for obstructive sleep apnea1.  Treatment options include medications such as nasal saline, topical corticosteroid and antihistamine sprays, and oral medications such as antihistamines or decongestants. Non-surgical treatments can include external nasal dilators for selected patients. If these are not successful by themselves, surgery can improve the nasal airway either alone or in combination with these other options.      Combination Procedures:  Combination of surgical procedures and other treatments may be recommended, particularly if patients have more than one area of narrowing or persistent positional disease.  The success rate of combination surgery ranges from 66-80%2,3.    References  Hubert FIELDS. The Role of the Nose in Snoring and Obstructive Sleep Apnoea: An Update.  Eur Arch Otorhinolaryngol. 2011; 268: 1365-73.   Vicenta SM; Sameer JA; Tushar JR; Pallanch JF; Jerald MB; William SG; Vish NUÑEZ. Surgical modifications of the upper airway for obstructive sleep apnea in adults: a systematic review and meta-analysis. SLEEP 2010;33(10):9002-3595. Ketan MOORE. Hypopharyngeal surgery in obstructive  sleep apnea: an evidence-based medicine review.  Arch Otolaryngol Head Neck Surg. 2006 Feb;132(2):206-13.  Andre YH1, Pop Y, Usman ROLANDO. The efficacy of anatomically based multilevel surgery for obstructive sleep apnea. Otolaryngol Head Neck Surg. 2003 Oct;129(4):327-35.  Ketan MOORE, Goldberg A. Hypopharyngeal Surgery in Obstructive Sleep Apnea: An Evidence-Based Medicine Review. Arch Otolaryngol Head Neck Surg. 2006 Feb;132(2):206-13.  Bennie MCFARLAND et al. Upper-Airway Stimulation for Obstructive Sleep Apnea.  N Engl J Med. 2014 Jan 9;370(2):139-49.  Callie Y et al. Increased Incidence of Cardiovascular Disease in Middle-aged Men with Obstructive Sleep Apnea. Am J Respir Crit Care Med; 2002 166: 159-165  Sandovalkami HINOJOSA et al. Studying Life Effects and Effectiveness of Palatopharyngoplasty (SLEEP) study: Subjective Outcomes of Isolated Uvulopalatopharyngoplasty. Otolaryngol Head Neck Surg. 2011; 144: 623-631.        WHAT IF I ONLY HAVE SNORING?    Mandibular advancement devices, lateral sleep positioning, long-term weight loss and treatment of nasal allergies have been shown to improve snoring.  Exercising tongue muscles with a game (https://Carhoots.com.[employer]ious [place]/us/odessa/soundly-reduce-snoring/zg2557281439) or stimulating the tongue during the day with a device (https://doi.org/10.3390/lpk06443787) have improved snoring in some individuals.    Remember to Drive Safe... Drive Alive     Sleep health profoundly affects your health, mood, and your safety.  Thirty three percent of the population (one in three of us) is not getting enough sleep and many have a sleep disorder. Not getting enough sleep or having an untreated / undertreated sleep condition may make us sleepy without even knowing it. In fact, our driving could be dramatically impaired due to our sleep health. As your provider, here are some things I would like you to know about driving:     Here are some warning signs for impairment and dangerous drowsy driving:               -Having been awake more than 16 hours               -Looking tired               -Eyelid drooping              -Head nodding (it could be too late at this point)              -Driving for more than 30 minutes     Some things you could do to make the driving safer if you are experiencing some drowsiness:              -Stop driving and rest              -Call for transportation              -Make sure your sleep disorder is adequately treated     Some things that have been shown NOT to work when experiencing drowsiness while driving:              -Turning on the radio              -Opening windows              -Eating any  distracting  /  entertaining  foods (e.g., sunflower seeds, candy, or any other)              -Talking on the phone      Your decision may not only impact your life, but also the life of others. Please, remember to drive safe for yourself and all of us.

## 2023-03-17 NOTE — NURSING NOTE
Is the patient currently in the state of [place]? YES    Visit mode:VIDEO    If the visit is dropped, the patient can be reconnected by: VIDEO VISIT: Text to cell phone: [phone]    Will anyone else be joining the visit? NO      How would you like to obtain your AVS? MyChart    Are changes needed to the allergy or medication list? NO    Reason for visit: kyra Smith

## 2023-03-22 ENCOUNTER — TELEPHONE (OUTPATIENT)
Dept: NEUROLOGY | Facility: CLINIC | Age: 38
End: 2023-03-22
Payer: COMMERCIAL

## 2023-03-22 DIAGNOSIS — G43.709 CHRONIC MIGRAINE WITHOUT AURA WITHOUT STATUS MIGRAINOSUS, NOT INTRACTABLE: ICD-10-CM

## 2023-03-22 RX ORDER — PROCHLORPERAZINE MALEATE 10 MG
10 TABLET ORAL EVERY 6 HOURS PRN
Qty: 10 TABLET | Refills: 1 | Status: SHIPPED | OUTPATIENT
Start: 2023-03-22 | End: 2024-01-16

## 2023-03-22 NOTE — TELEPHONE ENCOUNTER
[facility] Call Center    Phone Message    May a detailed message be left on voicemail: yes     Reason for Call: Medication Refill Request    Has the patient contacted the pharmacy for the refill? Yes   Name of medication being requested: prochlorperazine (COMPAZINE) 10 MG tablet  Provider who prescribed the medication: Dr. Linares  Pharmacy: [facility] [address]  Date medication is needed: MAGALIE Evans at pharmacy reports sending request for this medication on 3/11/23 and have not gotten response.      Action Taken: Message routed to:  [facility] ([facility] Neurology    Travel Screening: Not Applicable

## 2023-04-12 NOTE — NURSING NOTE
HST pick-up, HST drop-off, and follow-up appointment with provider have all been scheduled.  Maria Isabel Hurst, CMA

## 2023-04-28 ENCOUNTER — LAB (OUTPATIENT)
Dept: LAB | Facility: CLINIC | Age: 38
End: 2023-04-28
Payer: COMMERCIAL

## 2023-04-28 DIAGNOSIS — E03.8 SUBCLINICAL HYPOTHYROIDISM: ICD-10-CM

## 2023-04-28 DIAGNOSIS — R11.0 NAUSEA: ICD-10-CM

## 2023-04-28 DIAGNOSIS — R53.83 OTHER FATIGUE: ICD-10-CM

## 2023-04-28 LAB
ERYTHROCYTE [DISTWIDTH] IN BLOOD BY AUTOMATED COUNT: 13.4 % (ref 10–15)
HCT VFR BLD AUTO: 44.4 % (ref 40–53)
HGB BLD-MCNC: 15.4 G/DL (ref 13.3–17.7)
MCH RBC QN AUTO: 30.2 PG (ref 26.5–33)
MCHC RBC AUTO-ENTMCNC: 34.7 G/DL (ref 31.5–36.5)
MCV RBC AUTO: 87 FL (ref 78–100)
PLATELET # BLD AUTO: 269 10E3/UL (ref 150–450)
RBC # BLD AUTO: 5.1 10E6/UL (ref 4.4–5.9)
T4 FREE SERPL-MCNC: 0.84 NG/DL (ref 0.9–1.7)
TSH SERPL DL<=0.005 MIU/L-ACNC: 5.92 UIU/ML (ref 0.3–4.2)
WBC # BLD AUTO: 7.4 10E3/UL (ref 4–11)

## 2023-04-28 PROCEDURE — 36415 COLL VENOUS BLD VENIPUNCTURE: CPT

## 2023-04-28 PROCEDURE — 85027 COMPLETE CBC AUTOMATED: CPT

## 2023-04-28 PROCEDURE — 86376 MICROSOMAL ANTIBODY EACH: CPT

## 2023-04-28 PROCEDURE — 84443 ASSAY THYROID STIM HORMONE: CPT

## 2023-04-28 PROCEDURE — 86800 THYROGLOBULIN ANTIBODY: CPT

## 2023-04-28 PROCEDURE — 84439 ASSAY OF FREE THYROXINE: CPT

## 2023-04-30 ENCOUNTER — ANCILLARY PROCEDURE (OUTPATIENT)
Dept: MRI IMAGING | Facility: CLINIC | Age: 38
End: 2023-04-30
Attending: INTERNAL MEDICINE
Payer: COMMERCIAL

## 2023-04-30 DIAGNOSIS — R11.0 NAUSEA: ICD-10-CM

## 2023-04-30 PROCEDURE — A9585 GADOBUTROL INJECTION: HCPCS | Performed by: RADIOLOGY

## 2023-04-30 PROCEDURE — 74183 MRI ABD W/O CNTR FLWD CNTR: CPT | Performed by: RADIOLOGY

## 2023-04-30 RX ORDER — GADOBUTROL 604.72 MG/ML
15 INJECTION INTRAVENOUS ONCE
Status: COMPLETED | OUTPATIENT
Start: 2023-04-30 | End: 2023-04-30

## 2023-04-30 RX ADMIN — GADOBUTROL 11 ML: 604.72 INJECTION INTRAVENOUS at 13:45

## 2023-05-01 LAB
THYROGLOB AB SERPL IA-ACNC: <20 IU/ML
THYROPEROXIDASE AB SERPL-ACNC: 45 IU/ML

## 2023-05-03 DIAGNOSIS — E03.8 SUBCLINICAL HYPOTHYROIDISM: Primary | ICD-10-CM

## 2023-05-03 DIAGNOSIS — E06.3 HYPOTHYROIDISM DUE TO HASHIMOTO'S THYROIDITIS: ICD-10-CM

## 2023-05-03 RX ORDER — LEVOTHYROXINE SODIUM 50 UG/1
50 TABLET ORAL DAILY
Qty: 90 TABLET | Refills: 1 | Status: SHIPPED | OUTPATIENT
Start: 2023-05-03 | End: 2023-09-05

## 2023-05-15 ENCOUNTER — PATIENT OUTREACH (OUTPATIENT)
Dept: CARE COORDINATION | Facility: CLINIC | Age: 38
End: 2023-05-15
Payer: COMMERCIAL

## 2023-05-25 DIAGNOSIS — R11.0 NAUSEA: Primary | ICD-10-CM

## 2023-05-25 RX ORDER — ONDANSETRON 4 MG/1
4 TABLET, FILM COATED ORAL DAILY
Qty: 30 TABLET | Refills: 3 | Status: SHIPPED | OUTPATIENT
Start: 2023-05-25 | End: 2023-07-13

## 2023-05-25 NOTE — PROGRESS NOTES
Called to patient about his ongoing intermittent daily nausea.    EGD was unremarkable.     Plan  - given ongoing daily nausea, with disruption of daily life, will start on schedule daily Zofran 4 mg daily at noon (typically having symptom in the afternoon).  - if persist, will consider adding mirtazapine/nortryptyline daily    Sushant Padilla MD  Gastroenterology/Hepatology Fellow

## 2023-05-26 DIAGNOSIS — Z87.19 HISTORY OF CANKER SORES: ICD-10-CM

## 2023-05-26 DIAGNOSIS — M1A.4720 CHRONIC GOUT DUE TO OTHER SECONDARY CAUSE INVOLVING TOE OF LEFT FOOT WITHOUT TOPHUS: ICD-10-CM

## 2023-05-26 RX ORDER — FLUOCINONIDE GEL 0.5 MG/G
GEL TOPICAL 2 TIMES DAILY
Qty: 30 G | Refills: 1 | Status: SHIPPED | OUTPATIENT
Start: 2023-05-26 | End: 2024-05-14

## 2023-05-26 RX ORDER — ALLOPURINOL 100 MG/1
100 TABLET ORAL DAILY
Qty: 90 TABLET | Refills: 3 | Status: SHIPPED | OUTPATIENT
Start: 2023-05-26 | End: 2024-01-16

## 2023-05-26 NOTE — TELEPHONE ENCOUNTER
"Requested Prescriptions   Pending Prescriptions Disp Refills     allopurinol (ZYLOPRIM) 100 MG tablet 90 tablet 3     Sig: Take 1 tablet (100 mg) by mouth daily       Gout Agents Protocol Passed - 5/26/2023  4:23 PM        Passed - CBC on file in past 12 months     Recent Labs   Lab Test 04/28/23  1139   WBC 7.4   RBC 5.10   HGB 15.4   HCT 44.4                    Passed - ALT on file in past 12 months     Recent Labs   Lab Test 06/13/22  1105   ALT 42             Passed - Has Uric Acid on file in past 12 months and value is less than 6     Recent Labs   Lab Test 06/13/22  1105   URIC 5.8     If level is 6mg/dL or greater, ok to refill one time and refer to provider.           Passed - Recent (12 mo) or future (30 days) visit within the authorizing provider's specialty     Patient has had an office visit with the authorizing provider or a provider within the authorizing providers department within the previous 12 mos or has a future within next 30 days. See \"Patient Info\" tab in inbasket, or \"Choose Columns\" in Meds & Orders section of the refill encounter.              Passed - Medication is active on med list        Passed - Patient is age 18 or older        Passed - Normal serum creatinine on file in the past 12 months     Recent Labs   Lab Test 06/13/22  1105   CR 0.83       Ok to refill medication if creatinine is low             fluocinonide (LIDEX) 0.05 % external gel 30 g 1     Sig: Apply topically 2 times daily       Topical Steroids and Nonsteroidals Protocol Failed - 5/26/2023  4:23 PM        Failed - High potency steroid not ordered        Passed - Patient is age 6 or older        Passed - Authorizing prescriber's most recent note related to this medication read.     If refill request is for ophthalmic use, please forward request to provider for approval.          Passed - Recent (12 mo) or future (30 days) visit within the authorizing provider's specialty     Patient has had an office visit with " "the authorizing provider or a provider within the authorizing providers department within the previous 12 mos or has a future within next 30 days. See \"Patient Info\" tab in inbasket, or \"Choose Columns\" in Meds & Orders section of the refill encounter.              Passed - Medication is active on med list           Routing refill request to provider for review/approval because:  Drug not on the [facility] refill protocol     Thank you,   Nikolas Ramirez RN  [facility]           "

## 2023-05-30 ENCOUNTER — PATIENT OUTREACH (OUTPATIENT)
Dept: CARE COORDINATION | Facility: CLINIC | Age: 38
End: 2023-05-30
Payer: COMMERCIAL

## 2023-05-31 ENCOUNTER — OFFICE VISIT (OUTPATIENT)
Dept: URGENT CARE | Facility: URGENT CARE | Age: 38
End: 2023-05-31
Payer: COMMERCIAL

## 2023-05-31 VITALS
TEMPERATURE: 97.9 F | OXYGEN SATURATION: 97 % | HEART RATE: 92 BPM | WEIGHT: 260 LBS | SYSTOLIC BLOOD PRESSURE: 120 MMHG | BODY MASS INDEX: 35.21 KG/M2 | RESPIRATION RATE: 18 BRPM | DIASTOLIC BLOOD PRESSURE: 82 MMHG | HEIGHT: 72 IN

## 2023-05-31 DIAGNOSIS — M62.830 BACK MUSCLE SPASM: Primary | ICD-10-CM

## 2023-05-31 DIAGNOSIS — D18.09 VERTEBRAL BODY HEMANGIOMA: ICD-10-CM

## 2023-05-31 PROCEDURE — 99214 OFFICE O/P EST MOD 30 MIN: CPT | Performed by: NURSE PRACTITIONER

## 2023-05-31 RX ORDER — METHOCARBAMOL 750 MG/1
750 TABLET, FILM COATED ORAL 3 TIMES DAILY
Qty: 21 TABLET | Refills: 0 | Status: SHIPPED | OUTPATIENT
Start: 2023-05-31 | End: 2023-06-07

## 2023-05-31 NOTE — PROGRESS NOTES
Chief Complaint   Patient presents with     Urgent Care     Back Pain     Started Saturday morning when he woke up. Mid to upper back pain, no known injury     SUBJECTIVE:  Eliezer Ellis is a 38 year old male who presents to the clinic today with mid thoracic back pain for 5 days.  He says that it is tender to touch hurts more with movements and will spasm and shoot pain in the area.  No known injury or overuse.  He declines headache nausea vomiting new worsening photophobia nuchal rigidity.  No bowel bladder change leg weakness saddle anesthesia or sciatica down arm.  Of note he did recently have an MRI with an incidental finding of a vertebral hemangioma on his thoracic and lumbar vertebrae.    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 7/10/2017     Hyperlipidemia LDL goal <160 11/1/2013     Intermittent asthma 9/29/2011    reports no active problems     Seasonal allergies 9/29/2011     allopurinol (ZYLOPRIM) 100 MG tablet, Take 1 tablet (100 mg) by mouth daily  desvenlafaxine (PRISTIQ) 50 MG 24 hr tablet, Take 1 tablet (50 mg) by mouth daily  levothyroxine (SYNTHROID/LEVOTHROID) 50 MCG tablet, Take 1 tablet (50 mcg) by mouth daily  omeprazole (PRILOSEC) 40 MG DR capsule, Take 1 capsule (40 mg) by mouth daily  albuterol (PROAIR HFA/PROVENTIL HFA/VENTOLIN HFA) 108 (90 Base) MCG/ACT inhaler, Inhale 1-2 puffs into the lungs every 4 hours as needed for shortness of breath / dyspnea or wheezing (Patient not taking: Reported on 2/28/2023)  ALPRAZolam (XANAX) 1 MG tablet, Take 1 tablet (1 mg) by mouth once as needed for anxiety  cetirizine (ZYRTEC) 10 MG tablet, Take 10 mg by mouth daily.  fluocinonide (LIDEX) 0.05 % external gel, Apply topically 2 times daily  naratriptan (AMERGE) 2.5 MG tablet, Take 1 tablet (2.5 mg) by mouth at onset of headache for migraine (repeat in 4  "hours if needed) May repeat in 4 hours. Max 2 tablets/24 hours.  ondansetron (ZOFRAN) 4 MG tablet, Take 1 tablet (4 mg) by mouth daily  order for DME, Equipment being ordered: digital Blood pressure apparatus  prochlorperazine (COMPAZINE) 10 MG tablet, Take 1 tablet (10 mg) by mouth every 6 hours as needed for nausea or vomiting (migraine)  VITAMIN D PO, Take 1,000 Units by mouth daily Pt takes 2 tabs daily in the AM    Botulinum Toxin Type A (BOTOX) 200 units injection 150 Units  Botulinum Toxin Type A (BOTOX) 200 units injection 150 Units  Botulinum Toxin Type A (BOTOX) 200 units injection 150 Units  Botulinum Toxin Type A (BOTOX) 200 units injection 155 Units      Social History     Tobacco Use     Smoking status: Never     Smokeless tobacco: Never   Vaping Use     Vaping status: Not on file   Substance Use Topics     Alcohol use: Not Currently     Alcohol/week: 0.0 standard drinks of alcohol     Comment: 2-3x week     Allergies   Allergen Reactions     Dust Mites Shortness Of Breath     Mold Shortness Of Breath     Cats Itching     Other Food Allergy Difficulty breathing, Headache and Visual Disturbance     Pcn [Penicillins]      Seasonal Allergies        Review of Systems   All systems negative except for those listed above in HPI.    EXAM:   /82   Pulse 92   Temp 97.9  F (36.6  C) (Temporal)   Resp 18   Ht 1.816 m (5' 11.5\")   Wt 117.9 kg (260 lb)   SpO2 97%   BMI 35.76 kg/m      Physical Exam  Vitals reviewed.   Constitutional:       Appearance: Normal appearance.   HENT:      Head: Normocephalic and atraumatic.      Nose: Nose normal.      Mouth/Throat:      Mouth: Mucous membranes are moist.      Pharynx: Oropharynx is clear.   Eyes:      Extraocular Movements: Extraocular movements intact.      Conjunctiva/sclera: Conjunctivae normal.      Pupils: Pupils are equal, round, and reactive to light.   Cardiovascular:      Rate and Rhythm: Normal rate.      Pulses: Normal pulses.   Pulmonary:      " Effort: Pulmonary effort is normal.   Musculoskeletal:         General: Tenderness present. No signs of injury. Normal range of motion.      Cervical back: Normal range of motion and neck supple.   Skin:     General: Skin is warm and dry.      Findings: No erythema, lesion or rash.   Neurological:      General: No focal deficit present.      Mental Status: He is alert and oriented to person, place, and time.      Sensory: No sensory deficit.   Psychiatric:         Mood and Affect: Mood normal.         Behavior: Behavior normal.       ASSESSMENT:    ICD-10-CM    1. Back muscle spasm  M62.830 methocarbamol (ROBAXIN) 750 MG tablet      2. Vertebral body hemangioma  D18.09         PLAN:    Thoracic midline spinal pain  Recent MRI with hemangioma possible compression on spine  Robaxin given spasms like nature  Rotate Tylenol ibuprofen ice heat massage stretch  Recommend follow-up with PCP or orthospine if symptoms linger or worsen  ER if inability to walk bowel bladder change numbness tingling in groin severe pain    Follow up with primary care provider with any problems, questions or concerns or if symptoms worsen or fail to improve. Patient agreed to plan and verbalized understanding.    Nliam Waldrop, [facility] [place]

## 2023-06-12 ENCOUNTER — OFFICE VISIT (OUTPATIENT)
Dept: NEUROLOGY | Facility: CLINIC | Age: 38
End: 2023-06-12
Payer: COMMERCIAL

## 2023-06-12 ENCOUNTER — TELEPHONE (OUTPATIENT)
Dept: NEUROLOGY | Facility: CLINIC | Age: 38
End: 2023-06-12

## 2023-06-12 DIAGNOSIS — G43.709 CHRONIC MIGRAINE WITHOUT AURA WITHOUT STATUS MIGRAINOSUS, NOT INTRACTABLE: Primary | ICD-10-CM

## 2023-06-12 PROCEDURE — 64615 CHEMODENERV MUSC MIGRAINE: CPT | Performed by: PSYCHIATRY & NEUROLOGY

## 2023-06-12 ASSESSMENT — HEADACHE IMPACT TEST (HIT 6)
HOW OFTEN HAVE YOU FELT FED UP OR IRRITATED BECAUSE OF YOUR HEADACHES: NEVER
WHEN YOU HAVE HEADACHES HOW OFTEN IS THE PAIN SEVERE: RARELY
WHEN YOU HAVE A HEADACHE HOW OFTEN DO YOU WISH YOU COULD LIE DOWN: SOMETIMES
HIT6 TOTAL SCORE: 50
HOW OFTEN DO HEADACHES LIMIT YOUR DAILY ACTIVITIES: SOMETIMES
HOW OFTEN HAVE YOU FELT TOO TIRED TO WORK BECAUSE OF YOUR HEADACHES: RARELY
HOW OFTEN DID HEADACHS LIMIT CONCENTRATION ON WORK OR DAILY ACTIVITY: RARELY

## 2023-06-12 NOTE — TELEPHONE ENCOUNTER
Prior Authorization Retail Medication Request    Medication/Dose: Nurtec 75 mg  ICD code (if different than what is on RX):  G43.709  Previously Tried and Failed:  Emgality, desvenlafaxine, topiramate, amitriptyline, buspirone, venlafaxine, propranolol.  Naratriptan     Rationale:  He reports 10-12 headache days per month, with 10-12 severe headache days per month. He takes Tylenol and naratriptan as needed. Naratriptan is causing side effects.    Insurance Name: [employer]  Insurance ID:  [id number]     Pharmacy Information (if different than what is on RX)  Name:    Phone:

## 2023-06-12 NOTE — LETTER
6/12/2023       RE: Eliezer Ellis  [address]  [facility] [address]     Dear Colleague,    Thank you for referring your patient, Eliezer Ellis, to the [facility] [place] at [facility]. Please see a copy of my visit note below.    [facility]  Botulinum Toxin Procedure    Sol Linares MD  Headache Neurology    June 12, 2023    Procedure:  OnabotulinumtoxinA injections for chronic migraine  Indication:  Chronic migraine    Mr. Ellis suffers from severe intractable headaches.  He was referred by Dr. Linares for onabotulinumtoxinA injections for headache.  Risks, benefits, and alternatives were discussed.  All questions were answered and consent given.  He decided to proceed with the injections.     Prior to initiation of botulinum toxin injections, Mr. Ellis reported 20 headache days per month, with 20 severe headache days per month. His headaches are quite disabling and often interfere with his ability to function normally.     He reports 10-12 headache days per month, with 10-12 severe headache days per month. He takes Tylenol and naratriptan as needed. Naratriptan is causing side effects.  - I recommend Nurtec PRN as an alternative.     He has attempted other migraine prophylactic treatments in the past, which have included: Emgality, desvenlafaxine, topiramate, amitriptyline, buspirone, venlafaxine, propranolol.       He currently takes desvenlafaxine for headache prevention.    Mr. Ellis's pain was assessed prior to the procedure.  He rated his pain today as 2.5 out of 10.    Procedural Pause: Procedural pause was conducted to verify correct patient identity, procedure to be performed, correct side and site, correct patient position, and special requirements. Appropriate hand hygiene was utilized, and each injection site was prepped with alcohol wipe or Chloraprep swab.     Procedure Details: 200 units of onabotulinumtoxinA  was diluted in 4 mL 0.9% normal saline. A total of 150 units of onabotulinumtoxinA were injected using 30 gauge 0.5 in needles into the muscles listed below. 50 units of onabotulinumtoxinA were wasted.     Injection Sites: Total = 150 units onabotulinumtoxinA      and Procerus muscles - 5 units into the left and right corrugators and 5 units into the procerus (15 units total)    Frontalis muscles - 5 units into the left superior frontalis and 5 units into the right superior frontalis (2 injection sites per muscle) (10 units total)    Temporalis muscles - 12.5 units into the left temporalis muscle and 12.5 units into the right temporalis muscle (2 injection sites per muscle) (25 units total)    Occipitalis muscles - 12.5 units into the left occipitalis muscle and 12.5 units into the right occipitalis muscle (2 injection sites per muscle) (25 units total)    Splenius Capitis muscles - 12.5 units into the left splenius capitis muscle and 12.5 units into the right splenius capitis muscle (2 injection sites per muscle, divided into 2/3 anteriorly and 1/3 posteriorly) (25 units total)      Trapezius muscles - 25 units into the left trapezius muscle and 25 units into the right trapezius muscle (3 injection sites per muscle, divided 5 units, 10 units, 10 units, medial to lateral) (50 units total)    Mr. Ellis tolerated the procedure well without immediate complications.  He will follow up in clinic for assessment of the effectiveness of treatment.  He did not report any change in his pain level after the botulinumtoxinA injection procedure.    Sol Linares MD  Headache Neurology  [facility]

## 2023-06-12 NOTE — PROGRESS NOTES
[facility]  Botulinum Toxin Procedure    Sol Linares MD  Headache Neurology    June 12, 2023    Procedure:  OnabotulinumtoxinA injections for chronic migraine  Indication:  Chronic migraine    Mr. Ellis suffers from severe intractable headaches.  He was referred by Dr. Linares for onabotulinumtoxinA injections for headache.  Risks, benefits, and alternatives were discussed.  All questions were answered and consent given.  He decided to proceed with the injections.     Prior to initiation of botulinum toxin injections, Mr. Ellis reported 20 headache days per month, with 20 severe headache days per month. His headaches are quite disabling and often interfere with his ability to function normally.     He reports 10-12 headache days per month, with 10-12 severe headache days per month. He takes Tylenol and naratriptan as needed. Naratriptan is causing side effects.  - I recommend Nurtec PRN as an alternative.     He has attempted other migraine prophylactic treatments in the past, which have included: Emgality, desvenlafaxine, topiramate, amitriptyline, buspirone, venlafaxine, propranolol.       He currently takes desvenlafaxine for headache prevention.    Mr. Ellis's pain was assessed prior to the procedure.  He rated his pain today as 2.5 out of 10.    Procedural Pause: Procedural pause was conducted to verify correct patient identity, procedure to be performed, correct side and site, correct patient position, and special requirements. Appropriate hand hygiene was utilized, and each injection site was prepped with alcohol wipe or Chloraprep swab.     Procedure Details: 200 units of onabotulinumtoxinA was diluted in 4 mL 0.9% normal saline. A total of 150 units of onabotulinumtoxinA were injected using 30 gauge 0.5 in needles into the muscles listed below. 50 units of onabotulinumtoxinA were wasted.     Injection Sites: Total = 150 units onabotulinumtoxinA      and Procerus muscles - 5 units into the  left and right corrugators and 5 units into the procerus (15 units total)    Frontalis muscles - 5 units into the left superior frontalis and 5 units into the right superior frontalis (2 injection sites per muscle) (10 units total)    Temporalis muscles - 12.5 units into the left temporalis muscle and 12.5 units into the right temporalis muscle (2 injection sites per muscle) (25 units total)    Occipitalis muscles - 12.5 units into the left occipitalis muscle and 12.5 units into the right occipitalis muscle (2 injection sites per muscle) (25 units total)    Splenius Capitis muscles - 12.5 units into the left splenius capitis muscle and 12.5 units into the right splenius capitis muscle (2 injection sites per muscle, divided into 2/3 anteriorly and 1/3 posteriorly) (25 units total)      Trapezius muscles - 25 units into the left trapezius muscle and 25 units into the right trapezius muscle (3 injection sites per muscle, divided 5 units, 10 units, 10 units, medial to lateral) (50 units total)    Mr. Ellis tolerated the procedure well without immediate complications.  He will follow up in clinic for assessment of the effectiveness of treatment.  He did not report any change in his pain level after the botulinumtoxinA injection procedure.    Sol Linares MD  Headache Neurology  [facility]

## 2023-06-13 ENCOUNTER — TELEPHONE (OUTPATIENT)
Dept: NEUROLOGY | Facility: CLINIC | Age: 38
End: 2023-06-13
Payer: COMMERCIAL

## 2023-06-13 NOTE — TELEPHONE ENCOUNTER
[facility] Call Center    Phone Message    May a detailed message be left on voicemail: yes     Reason for Call: Medication Question or concern regarding medication   Prescription Clarification  Name of Medication:rimegepant (NURTEC) 75 MG ODT tablet [[id number]] (Order [id number]    Prescribing Provider: Sol Linares MD   Pharmacy: [email or web address]  Please call [email or web address] back @ [phone] to discuss further.        Action Taken: Message routed to:  [facility] Neurology    Travel Screening: Not Applicable

## 2023-06-13 NOTE — TELEPHONE ENCOUNTER
[facility] Call Center    Phone Message    May a detailed message be left on voicemail: yes     Reason for Call: Medication Question or concern regarding medication   Prescription Clarification  Name of Medication: Nurtec as well as Naratriptan  Prescribing Provider: Sol Linares   Pharmacy: [facility]    What on the order needs clarification?     Terrance from [facility] called and stated that the patient has Nurtec as well as Naratriptan active. Terrance stated that these are both migraine medications and would like clarity on which medication to fill,  Please call back and advise at [phone].    Action Taken: Message routed to:  [facility] Neurology     Travel Screening: Not Applicable

## 2023-06-14 NOTE — TELEPHONE ENCOUNTER
Called [facility] pharmacy, spoke to Vani, she stated pharmacy needs to know is Nurtec replacing Naratriptan?   Informed her I will check with provider and call her back.

## 2023-06-14 NOTE — TELEPHONE ENCOUNTER
Called [facility], spoke to Clarita and informed her Dr. Linares's note verbatim:    I am ok with him having access to both; they do not interact. We are hoping Nurtec can replace naratriptan, but I don't want to take away his only medication that helps him until he's had a chance to try Nurtec.     MD Clarita Mckeon verbally understood and noted this in pt's account.

## 2023-06-16 NOTE — TELEPHONE ENCOUNTER
Prior Authorization Not Needed per Insurance    Medication: RIMEGEPANT SULFATE 75 MG PO TBDP  Insurance Company: [employer] - Phone [phone] Fax [phone]  Expected CoPay:      Pharmacy Filling the Rx: [facility] - [place], [place] - [address]  Pharmacy Notified: Yes  Patient Notified: Yes **Instructed pharmacy to notify patient when script is ready to /ship.**

## 2023-06-22 ENCOUNTER — OFFICE VISIT (OUTPATIENT)
Dept: SLEEP MEDICINE | Facility: CLINIC | Age: 38
End: 2023-06-22
Attending: NURSE PRACTITIONER
Payer: COMMERCIAL

## 2023-06-22 DIAGNOSIS — G47.00 INSOMNIA, UNSPECIFIED TYPE: ICD-10-CM

## 2023-06-22 DIAGNOSIS — E66.9 OBESITY (BMI 30-39.9): ICD-10-CM

## 2023-06-22 DIAGNOSIS — R06.81 WITNESSED APNEIC SPELLS: ICD-10-CM

## 2023-06-22 DIAGNOSIS — R06.83 SNORING: ICD-10-CM

## 2023-06-22 DIAGNOSIS — G47.9 SLEEP DISTURBANCE: ICD-10-CM

## 2023-06-22 PROCEDURE — G0399 HOME SLEEP TEST/TYPE 3 PORTA: HCPCS | Performed by: INTERNAL MEDICINE

## 2023-06-23 ENCOUNTER — DOCUMENTATION ONLY (OUTPATIENT)
Dept: SLEEP MEDICINE | Facility: CLINIC | Age: 38
End: 2023-06-23
Payer: COMMERCIAL

## 2023-06-23 NOTE — PROGRESS NOTES
HST POST-STUDY QUESTIONNAIRE    1. What time did you go to bed?  12:00  2. How long do you think it took to fall asleep?  30-45 min  3. What time did you wake up to start the day?  9:30  4. Did you get up during the night at all?  yes  5. If you woke up, do you remember approximately what time(s)? 5:50  6. Did you have any difficulty with the equipment?  No  7. Did you us any type of treatment with this study?  None  8. Was the head of the bed elevated? No  9. Did you sleep in a recliner?  No  10. Did you stop using CPAP at least 3 days before this test?  NA  11. Any other information you'd like us to know? No    This HSAT was performed using a Noxturnal T3 device which recorded snore, sound, movement activity, body position, nasal pressure, oronasal thermal airflow, pulse, oximetry and both chest and abdominal respiratory effort. HSAT data was restricted to the time patient states they were in bed.     HSAT was scored using 1B 4% hypopnea rule.     HST AHI (Non-PAT): 18.5  Snoring was reported as loud.  Time with SpO2 below 89% was 8.5 minutes.   Overall signal quality was good     Pt will follow up with sleep provider to determine appropriate therapy.

## 2023-06-26 NOTE — PROCEDURES
"HOME SLEEP STUDY INTERPRETATION    Patient: Eliezer Ellis  MRN: [id number]  YOB: 1985  Study Date: 6/22/2023  Referring Provider: Ursula Huang MD  Ordering Provider: Morgan Candelario DO     Indications for Home Study: Eliezer Ellis is a 38 year old male who presents with symptoms suggestive of obstructive sleep apnea.    Estimated body mass index is 35.76 kg/m  as calculated from the following:    Height as of 5/31/23: 1.816 m (5' 11.5\").    Weight as of 5/31/23: 117.9 kg (260 lb).  Total score - [place]: 4 (3/17/2023  1:05 PM)  Total Score: 6 (3/17/2023  1:52 PM)    Data: A full night home sleep study was performed recording the standard physiologic parameters including body position, movement, sound, nasal pressure, thermal oral airflow, chest and abdominal movements with respiratory inductance plethysmography, and oxygen saturation by pulse oximetry. Pulse rate was estimated by oximetry recording. This study was considered adequate based on > 4 hours of quality oximetry and respiratory recording. As specified by the AASM Manual for the Scoring of Sleep and Associated events, version 2.3, Rule VIII.D 1B, 4% oxygen desaturation scoring for hypopneas is used as a standard of care on all home sleep apnea testing.    Analysis Time:  537.4 minutes    Respiration:   Sleep Associated Hypoxemia: sustained hypoxemia was present. Baseline oxygen saturation was 94%.  Time with saturation less than or equal to 88% was 5.5 minutes. The lowest oxygen saturation was 74%.   Snoring: Snoring was present.  Respiratory events: The home study revealed a presence of 31 obstructive apneas and 6 mixed and central apneas. There were 129 hypopneas resulting in a combined apnea/hypopnea index [AHI] of 18.5 events per hour.  AHI was 77.2 per hour supine, N/A per hour prone, 14.8 per hour on left side, and 11.1 per hour on right side.   Pattern: Excluding events noted above, respiratory rate and pattern was " Normal.    Position: Percent of time spent: supine - 9.3%, prone - 0%, on left - 35.5%, on right - 55.2%.    Heart Rate: By pulse oximetry normal rate was noted.     Assessment:   Moderate obstructive sleep apnea.  Sleep associated hypoxemia was present.    Recommendations:  Consider auto-CPAP at 5-20 cmH2O, oral appliance therapy, positional therapy or polysomnography with full night PAP titration.  Suggest optimizing sleep hygiene and avoiding sleep deprivation.  Weight management.    Diagnosis Code(s): Obstructive Sleep Apnea G47.33, Hypoxemia G47.36    Morgan Candelario DO, June 26, 2023   Diplomate, American Board of Internal Medicine, Sleep Medicine

## 2023-07-06 ASSESSMENT — SLEEP AND FATIGUE QUESTIONNAIRES
HOW LIKELY ARE YOU TO NOD OFF OR FALL ASLEEP WHILE SITTING QUIETLY AFTER LUNCH WITHOUT ALCOHOL: WOULD NEVER DOZE
HOW LIKELY ARE YOU TO NOD OFF OR FALL ASLEEP WHILE LYING DOWN TO REST IN THE AFTERNOON WHEN CIRCUMSTANCES PERMIT: SLIGHT CHANCE OF DOZING
HOW LIKELY ARE YOU TO NOD OFF OR FALL ASLEEP WHILE SITTING INACTIVE IN A PUBLIC PLACE: WOULD NEVER DOZE
HOW LIKELY ARE YOU TO NOD OFF OR FALL ASLEEP WHILE WATCHING TV: WOULD NEVER DOZE
HOW LIKELY ARE YOU TO NOD OFF OR FALL ASLEEP WHILE SITTING AND TALKING TO SOMEONE: WOULD NEVER DOZE
HOW LIKELY ARE YOU TO NOD OFF OR FALL ASLEEP WHEN YOU ARE A PASSENGER IN A CAR FOR AN HOUR WITHOUT A BREAK: MODERATE CHANCE OF DOZING
HOW LIKELY ARE YOU TO NOD OFF OR FALL ASLEEP WHILE SITTING AND READING: WOULD NEVER DOZE
HOW LIKELY ARE YOU TO NOD OFF OR FALL ASLEEP IN A CAR, WHILE STOPPED FOR A FEW MINUTES IN TRAFFIC: WOULD NEVER DOZE

## 2023-07-13 ENCOUNTER — OFFICE VISIT (OUTPATIENT)
Dept: SLEEP MEDICINE | Facility: CLINIC | Age: 38
End: 2023-07-13
Payer: COMMERCIAL

## 2023-07-13 VITALS
HEART RATE: 78 BPM | RESPIRATION RATE: 18 BRPM | BODY MASS INDEX: 36.57 KG/M2 | HEIGHT: 72 IN | SYSTOLIC BLOOD PRESSURE: 121 MMHG | DIASTOLIC BLOOD PRESSURE: 83 MMHG | WEIGHT: 270 LBS | OXYGEN SATURATION: 98 %

## 2023-07-13 DIAGNOSIS — G47.33 OSA (OBSTRUCTIVE SLEEP APNEA): Primary | ICD-10-CM

## 2023-07-13 DIAGNOSIS — G47.36 HYPOXEMIA ASSOCIATED WITH SLEEP: ICD-10-CM

## 2023-07-13 PROCEDURE — 99215 OFFICE O/P EST HI 40 MIN: CPT | Performed by: NURSE PRACTITIONER

## 2023-07-13 NOTE — PROGRESS NOTES
"Home Sleep Apnea Testing Results Visit:    Chief Complaint   Patient presents with     Results     Follow up to discuss HST results       Eliezer Ellis is a 38-year-old male with a PMH pertinent for chronic daily headache/migraine, prehypertension, subclinical hypothyroidism, asthma, HLD, GERD, gout, major depression, anxiety, and obesity who returns to [facility] after having had Home Sleep Apnea Testing.  He presented with symptoms suggestive of obstructive sleep apnea.    Estimated body mass index is 37.13 kg/m  as calculated from the following:    Height as of this encounter: 1.816 m (5' 11.5\").    Weight as of this encounter: 122.5 kg (270 lb).    Total score - Petroleum: 3 (7/6/2023 10:07 AM)   Total Score: 6 (3/17/2023  1:52 PM)  Total score - Insomnia Severity Index: 6 (7/6/2023 10:06 AM)    Home Sleep Apnea Testing - 6/22/2023: Weight: 260 lbs  Analysis Time:  537.4 minutes     Respiration:   Sleep Associated Hypoxemia: sustained hypoxemia was present. Baseline oxygen saturation was 94%.  Time with saturation less than or equal to 88% was 5.5 minutes. The lowest oxygen saturation was 74%.   Snoring: Snoring was present. Snoring was reported as loud.  Respiratory events: The home study revealed a presence of 31 obstructive apneas and 6 mixed and central apneas. There were 129 hypopneas resulting in a combined apnea/hypopnea index [AHI] of 18.5 events per hour.  AHI was 77.2 per hour supine, N/A per hour prone, 14.8 per hour on left side, and 11.1 per hour on right side.   Pattern: Excluding events noted above, respiratory rate and pattern was Normal.     Position: Percent of time spent: supine - 9.3%, prone - 0%, on left - 35.5%, on right - 55.2%.     Heart Rate: By pulse oximetry normal rate was noted.      Assessment:   Moderate obstructive sleep apnea.  Sleep associated hypoxemia was present.      Overall signal quality was Good.     Eliezer Ellis reports that he slept Good .     Home " "Sleep Apnea Testing was reviewed in detail today with Eliezer and a copy given to him for his records.    Past medical/surgical history, family history, social history, medications and allergies were reviewed.    Patient Active Problem List   Diagnosis     Onychomycosis     CARDIOVASCULAR SCREENING; LDL GOAL LESS THAN 160     Prehypertension     Anxiety attack     Anxiety     Mixed hyperlipidemia     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     Encounter for pharmacogenetic testing     Moderate episode of recurrent major depressive disorder (H)     Chronic daily headache     Migraine equivalent     Morbid obesity (H)     Weight gain     Gastroesophageal reflux disease with esophagitis, unspecified whether hemorrhage     Nausea     Chronic migraine without aura without status migrainosus, not intractable     History of canker sores     Reactive depression     Rib pain on right side     Current Outpatient Medications   Medication     albuterol (PROAIR HFA/PROVENTIL HFA/VENTOLIN HFA) 108 (90 Base) MCG/ACT inhaler     allopurinol (ZYLOPRIM) 100 MG tablet     ALPRAZolam (XANAX) 1 MG tablet     cetirizine (ZYRTEC) 10 MG tablet     desvenlafaxine (PRISTIQ) 50 MG 24 hr tablet     fluocinonide (LIDEX) 0.05 % external gel     levothyroxine (SYNTHROID/LEVOTHROID) 50 MCG tablet     omeprazole (PRILOSEC) 40 MG DR capsule     order for DME     prochlorperazine (COMPAZINE) 10 MG tablet     rimegepant (NURTEC) 75 MG ODT tablet     VITAMIN D PO     Current Facility-Administered Medications   Medication     Botulinum Toxin Type A (BOTOX) 200 units injection 150 Units     Botulinum Toxin Type A (BOTOX) 200 units injection 150 Units     Botulinum Toxin Type A (BOTOX) 200 units injection 150 Units     Botulinum Toxin Type A (BOTOX) 200 units injection 150 Units     Botulinum Toxin Type A (BOTOX) 200 units injection 155 Units       /83   Pulse 78   Resp 18   Ht 1.816 m (5' 11.5\")   Wt " 122.5 kg (270 lb)   SpO2 98%   BMI 37.13 kg/m      Impression/Plan:  Moderate Obstructive Sleep Apnea.   Sleep associated hypoxemia was present.  - Sleep Positioning Device  ()  - Sleep Dental Referral; Future    Treatment options discussed today including  auto-CPAP at 5-15 cmH2O, oral appliance therapy or polysomnography with full night PAP titration.    Elected treatment with oral appliance therapy and positional therapy.  A sleep dental referral order was placed for MAD and a prescription for sleep positioning device to obtain Zzoma pillow today and given to the patient.    Follow-up in approximately 3 months after obtaining and adequate adjustment of MAD to discuss efficacy HST with MAD.  He may also elect to contact me via REVENTIVE message for an order for HST and then follow-up for results.    40 minutes spent with patient with >50% spent in counseling, chart review/documentation, and coordination of care on the date of the encounter.      MARSHAL Handy CNP  Sleep Medicine      CC:  Ursula Huang,     This note was written with the assistance of the Dragon voice-dictation technology software. The final document, although reviewed, may contain errors. For corrections, please contact the office.

## 2023-07-13 NOTE — PATIENT INSTRUCTIONS
Devices that maintain sleeping on the back to prevent snoring and mild sleep apnea.    [email or web address]  [email or web address]  [email or web address]      SLEEP DENTAL PROVIDERS LIST:    [facility] [place] (Medicare)  Provider: Elmer Briceno DDS   [address] [place] [address]  Phone: [phone]  Fax: [phone]    [facility]  Providers: Cricket Jiang DDS, Karina Wu DDS, Evens Espana DDS  Fax [phone]  Locations:   [phone]  [facility]  [address]  [facility]  [address]  [facility]  [address] [facility] [address] [facility]  [address]   [phone]  [facility]  [address]  [address] [facility] [address]  [place]  [address] [facility] (Medicare)  Providers: Fabio Hopkins DDS, FAGD, Martina Heath DDS, MS, Deisy Boyd DDS, Shefali Amezquita DDS  [address], [place] [address]  Phone: [phone]  Fax: [phone]    [facility]  Provider: Kiara Thomas DDS, PhD    [address] [place] [address]  Phone: [phone]  Fax: [phone]    [address] [place] [address]  Phone: [phone]  Fax: [phone]      [facility]  Provider: Bowen Barajas DDS  Address: [address], [place] [address]  Phone: [phone]      [facility]   Provider: Kvng Nicole DDS  [facility]  [address] [place] [address]  Appointments: [phone]    [facility]   Provider: Connor Fortune DDS   [address], [address]   [place] [address]   Appointments: [phone]   Fax: [phone]     [facility]   Provider: Martin Lopez DDS, MS - [DME Medicare]  [place]  [facility]   [address], [address]   [place] [address]   Appointments: [phone]   Fax: [phone]     [facility]  Provider: Augie Mandel DMD, MSD - [[facility] Medicare]  [address], [address]  [place] [address]  Appointments [phone]  Fax: [phone]    [facility]   [facility]   Provider: Elbert Torres DDS, [facility]  [address] [place] [address] [address]  [place] [address]   Appointments: [phone]  Fax: [phone]     [facility]   Providers: Rojelio Britt DMD, Connor Fortune DDS   [address]   [place] [address]   Appointments: [phone]     [facility]   Providers: Dusty Barbour DDS, Evens Barraza DDS  [address], [address]   [place] [address]   Appointments: [phone]   Fax: [phone]     Provider: Josafat Felix DDS  [address]  [address]  [phone]

## 2023-07-22 ENCOUNTER — HEALTH MAINTENANCE LETTER (OUTPATIENT)
Age: 38
End: 2023-07-22

## 2023-08-11 NOTE — TELEPHONE ENCOUNTER
- PMHx of   - Noted on CTA Chest 9/2021, 12/2021    See Acute pulmonary embolism without acute cor pulmonale     I returned a call to [facility] and was on hold for over 6 min so I left a voice mail for Terrance asking that he fill the Nurtec.

## 2023-08-16 ENCOUNTER — MYC MEDICAL ADVICE (OUTPATIENT)
Dept: FAMILY MEDICINE | Facility: CLINIC | Age: 38
End: 2023-08-16
Payer: COMMERCIAL

## 2023-08-16 DIAGNOSIS — K21.00 GASTROESOPHAGEAL REFLUX DISEASE WITH ESOPHAGITIS, UNSPECIFIED WHETHER HEMORRHAGE: Primary | ICD-10-CM

## 2023-08-22 DIAGNOSIS — E06.3 HYPOTHYROIDISM DUE TO HASHIMOTO'S THYROIDITIS: ICD-10-CM

## 2023-09-01 ENCOUNTER — LAB (OUTPATIENT)
Dept: LAB | Facility: CLINIC | Age: 38
End: 2023-09-01
Payer: COMMERCIAL

## 2023-09-01 DIAGNOSIS — E06.3 HYPOTHYROIDISM DUE TO HASHIMOTO'S THYROIDITIS: ICD-10-CM

## 2023-09-01 LAB — TSH SERPL DL<=0.005 MIU/L-ACNC: 2.7 UIU/ML (ref 0.3–4.2)

## 2023-09-01 PROCEDURE — 84443 ASSAY THYROID STIM HORMONE: CPT | Performed by: PATHOLOGY

## 2023-09-01 PROCEDURE — 36415 COLL VENOUS BLD VENIPUNCTURE: CPT | Performed by: PATHOLOGY

## 2023-09-04 DIAGNOSIS — E06.3 HYPOTHYROIDISM DUE TO HASHIMOTO'S THYROIDITIS: ICD-10-CM

## 2023-09-05 RX ORDER — LEVOTHYROXINE SODIUM 50 UG/1
50 TABLET ORAL DAILY
Qty: 90 TABLET | Refills: 1 | Status: SHIPPED | OUTPATIENT
Start: 2023-09-05

## 2023-09-06 DIAGNOSIS — E06.3 HYPOTHYROIDISM DUE TO HASHIMOTO'S THYROIDITIS: Primary | ICD-10-CM

## 2023-10-23 ASSESSMENT — HEADACHE IMPACT TEST (HIT 6)
WHEN YOU HAVE A HEADACHE HOW OFTEN DO YOU WISH YOU COULD LIE DOWN: SOMETIMES
HOW OFTEN HAVE YOU FELT FED UP OR IRRITATED BECAUSE OF YOUR HEADACHES: SOMETIMES
WHEN YOU HAVE HEADACHES HOW OFTEN IS THE PAIN SEVERE: SOMETIMES
HOW OFTEN HAVE YOU FELT TOO TIRED TO WORK BECAUSE OF YOUR HEADACHES: SOMETIMES
HOW OFTEN DID HEADACHS LIMIT CONCENTRATION ON WORK OR DAILY ACTIVITY: SOMETIMES
HIT6 TOTAL SCORE: 60
HOW OFTEN DO HEADACHES LIMIT YOUR DAILY ACTIVITIES: SOMETIMES

## 2023-10-24 DIAGNOSIS — F32.9 REACTIVE DEPRESSION: ICD-10-CM

## 2023-10-24 DIAGNOSIS — F41.9 ANXIETY: ICD-10-CM

## 2023-10-26 RX ORDER — DESVENLAFAXINE 50 MG/1
50 TABLET, FILM COATED, EXTENDED RELEASE ORAL DAILY
Qty: 90 TABLET | Refills: 0 | Status: SHIPPED | OUTPATIENT
Start: 2023-10-26 | End: 2024-01-08

## 2023-10-30 ENCOUNTER — OFFICE VISIT (OUTPATIENT)
Dept: NEUROLOGY | Facility: CLINIC | Age: 38
End: 2023-10-30
Payer: COMMERCIAL

## 2023-10-30 DIAGNOSIS — G43.709 CHRONIC MIGRAINE WITHOUT AURA WITHOUT STATUS MIGRAINOSUS, NOT INTRACTABLE: Primary | ICD-10-CM

## 2023-10-30 PROCEDURE — 64615 CHEMODENERV MUSC MIGRAINE: CPT | Performed by: PSYCHIATRY & NEUROLOGY

## 2023-10-30 NOTE — LETTER
10/30/2023       RE: Eliezer Ellis  [address]  [facility] [address]     Dear Colleague,    Thank you for referring your patient, Eliezer Ellis, to the [facility] [place] at [facility]. Please see a copy of my visit note below.    [facility]  Botulinum Toxin Procedure    Sol Linares MD  Headache Neurology    October 30, 2023    Procedure:  OnabotulinumtoxinA injections for chronic migraine  Indication:  Chronic migraine    Mr. Ellis suffers from severe intractable headaches.  He was referred by Dr. Linares for onabotulinumtoxinA injections for headache.  Risks, benefits, and alternatives were discussed.  All questions were answered and consent given.  He decided to proceed with the injections.      Prior to initiation of botulinum toxin injections, Mr. Ellis reported 20 headache days per month, with 20 severe headache days per month. His headaches are quite disabling and often interfere with his ability to function normally.    Last set of injections: June 12, 2023, overdue today     He reports 10-12 headache days per month, with 10-12 severe headache days per month. Headaches are nearly daily since Botox wore off.    He takes Tylenol and Nurtec as needed.     He has attempted other migraine prophylactic treatments in the past, which have included: Emgality, desvenlafaxine, topiramate, amitriptyline, buspirone, venlafaxine, propranolol.       He currently takes desvenlafaxine for headache prevention.    Mr. Ellis's pain was assessed prior to the procedure.  He rated his pain today as 2 out of 10.    Procedural Pause: Procedural pause was conducted to verify correct patient identity, procedure to be performed, correct side and site, correct patient position, and special requirements. Appropriate hand hygiene was utilized, and each injection site was prepped with alcohol wipe or Chloraprep swab.     Procedure Details: 200  units of onabotulinumtoxinA was diluted in 4 mL 0.9% normal saline. A total of 150 units of onabotulinumtoxinA were injected using 30 gauge 0.5 in needles into the muscles listed below. 50 units of onabotulinumtoxinA were wasted.     Injection Sites: Total = 150 units onabotulinumtoxinA      and Procerus muscles - 5 units into the left and right corrugators and 5 units into the procerus (15 units total)    Frontalis muscles - 5 units into the left superior frontalis and 5 units into the right superior frontalis (2 injection sites per muscle) (10 units total)    Temporalis muscles - 12.5 units into the left temporalis muscle and 12.5 units into the right temporalis muscle (2 injection sites per muscle) (25 units total)    Occipitalis muscles - 12.5 units into the left occipitalis muscle and 12.5 units into the right occipitalis muscle (2 injection sites per muscle) (25 units total)    Splenius Capitis muscles - 12.5 units into the left splenius capitis muscle and 12.5 units into the right splenius capitis muscle (2 injection sites per muscle, divided into 2/3 anteriorly and 1/3 posteriorly) (25 units total)      Trapezius muscles - 25 units into the left trapezius muscle and 25 units into the right trapezius muscle (3 injection sites per muscle, divided 5 units, 10 units, 10 units, medial to lateral) (50 units total)    Mr. Ellis tolerated the procedure well without immediate complications.  He will follow up in clinic for assessment of the effectiveness of treatment.  He did not report any change in his pain level after the botulinumtoxinA injection procedure.        Again, thank you for allowing me to participate in the care of your patient.      Sincerely,    Sol Linares MD

## 2023-10-30 NOTE — PROGRESS NOTES
[facility]  Botulinum Toxin Procedure    Sol Linares MD  Headache Neurology    October 30, 2023    Procedure:  OnabotulinumtoxinA injections for chronic migraine  Indication:  Chronic migraine    Mr. Ellis suffers from severe intractable headaches.  He was referred by Dr. Linares for onabotulinumtoxinA injections for headache.  Risks, benefits, and alternatives were discussed.  All questions were answered and consent given.  He decided to proceed with the injections.      Prior to initiation of botulinum toxin injections, Mr. Ellis reported 20 headache days per month, with 20 severe headache days per month. His headaches are quite disabling and often interfere with his ability to function normally.    Last set of injections: June 12, 2023, overdue today     He reports 10-12 headache days per month, with 10-12 severe headache days per month. Headaches are nearly daily since Botox wore off.    He takes Tylenol and Nurtec as needed.     He has attempted other migraine prophylactic treatments in the past, which have included: Emgality, desvenlafaxine, topiramate, amitriptyline, buspirone, venlafaxine, propranolol.       He currently takes desvenlafaxine for headache prevention.    Mr. Ellis's pain was assessed prior to the procedure.  He rated his pain today as 2 out of 10.    Procedural Pause: Procedural pause was conducted to verify correct patient identity, procedure to be performed, correct side and site, correct patient position, and special requirements. Appropriate hand hygiene was utilized, and each injection site was prepped with alcohol wipe or Chloraprep swab.     Procedure Details: 200 units of onabotulinumtoxinA was diluted in 4 mL 0.9% normal saline. A total of 150 units of onabotulinumtoxinA were injected using 30 gauge 0.5 in needles into the muscles listed below. 50 units of onabotulinumtoxinA were wasted.     Injection Sites: Total = 150 units onabotulinumtoxinA      and Procerus  muscles - 5 units into the left and right corrugators and 5 units into the procerus (15 units total)    Frontalis muscles - 5 units into the left superior frontalis and 5 units into the right superior frontalis (2 injection sites per muscle) (10 units total)    Temporalis muscles - 12.5 units into the left temporalis muscle and 12.5 units into the right temporalis muscle (2 injection sites per muscle) (25 units total)    Occipitalis muscles - 12.5 units into the left occipitalis muscle and 12.5 units into the right occipitalis muscle (2 injection sites per muscle) (25 units total)    Splenius Capitis muscles - 12.5 units into the left splenius capitis muscle and 12.5 units into the right splenius capitis muscle (2 injection sites per muscle, divided into 2/3 anteriorly and 1/3 posteriorly) (25 units total)      Trapezius muscles - 25 units into the left trapezius muscle and 25 units into the right trapezius muscle (3 injection sites per muscle, divided 5 units, 10 units, 10 units, medial to lateral) (50 units total)    Mr. Ellis tolerated the procedure well without immediate complications.  He will follow up in clinic for assessment of the effectiveness of treatment.  He did not report any change in his pain level after the botulinumtoxinA injection procedure.    Sol Linares MD  Headache Neurology  [facility]

## 2023-12-11 DIAGNOSIS — G43.709 CHRONIC MIGRAINE WITHOUT AURA WITHOUT STATUS MIGRAINOSUS, NOT INTRACTABLE: ICD-10-CM

## 2023-12-15 NOTE — TELEPHONE ENCOUNTER
rimegepant (NURTEC) 75 MG ODT tablet   8 tablet 11 6/12/2023       Last Office Visit : 10-  Future Office visit:  1-

## 2024-01-06 DIAGNOSIS — F41.9 ANXIETY: ICD-10-CM

## 2024-01-06 DIAGNOSIS — F32.9 REACTIVE DEPRESSION: ICD-10-CM

## 2024-01-08 ENCOUNTER — TELEPHONE (OUTPATIENT)
Dept: NEUROLOGY | Facility: CLINIC | Age: 39
End: 2024-01-08
Payer: COMMERCIAL

## 2024-01-08 RX ORDER — DESVENLAFAXINE 50 MG/1
50 TABLET, FILM COATED, EXTENDED RELEASE ORAL DAILY
Qty: 90 TABLET | Refills: 0 | Status: SHIPPED | OUTPATIENT
Start: 2024-01-08 | End: 2024-01-16

## 2024-01-08 NOTE — TELEPHONE ENCOUNTER
Prior Authorization Retail Medication Request     Medication/Dose: Nurtec 75 mg  ICD code (if different than what is on RX):  G43.709  Previously Tried and Failed:  Emgality, desvenlafaxine, topiramate, amitriptyline, buspirone, venlafaxine, propranolol.  Naratriptan     Rationale:  He reports 10-12 headache days per month, with 10-12 severe headache days per month. He takes Tylenol and naratriptan as needed. Naratriptan is causing side effects.     Insurance Name: [employer] Self Insured  Insurance ID:  [id number]     Secondary Insurance: Medicaid [place]  Insurance ID: [id number]     Pharmacy Information (if different than what is on RX)  Name:    Phone:

## 2024-01-11 NOTE — TELEPHONE ENCOUNTER
Prior Authorization Not Needed per Insurance    Medication: RIMEGEPANT SULFATE 75 MG PO TBDP  Insurance Company: [employer] Non-Specialty PA's - Phone [phone] Fax [phone]  Expected CoPay: $    Pharmacy Filling the Rx: [facility] - [place], [place] - [address]  Pharmacy Notified: y  Patient Notified: y - pharmacy to notify    Spoke with pharmacy, their request was for refills not PA.

## 2024-01-12 NOTE — TELEPHONE ENCOUNTER
LVM, needing clarification on how many Nurtec tablets he takes/month. Original Rx for 8 tablets/month. However pharmacy was filling 16 tablets/month per insurance approval.

## 2024-01-16 ENCOUNTER — OFFICE VISIT (OUTPATIENT)
Dept: FAMILY MEDICINE | Facility: CLINIC | Age: 39
End: 2024-01-16
Payer: COMMERCIAL

## 2024-01-16 VITALS
BODY MASS INDEX: 36.23 KG/M2 | HEIGHT: 72 IN | RESPIRATION RATE: 18 BRPM | TEMPERATURE: 97.2 F | SYSTOLIC BLOOD PRESSURE: 126 MMHG | OXYGEN SATURATION: 98 % | HEART RATE: 91 BPM | DIASTOLIC BLOOD PRESSURE: 81 MMHG | WEIGHT: 267.5 LBS

## 2024-01-16 DIAGNOSIS — R03.0 ELEVATED BP WITHOUT DIAGNOSIS OF HYPERTENSION: ICD-10-CM

## 2024-01-16 DIAGNOSIS — F41.9 ANXIETY: ICD-10-CM

## 2024-01-16 DIAGNOSIS — G89.29 CHRONIC BILATERAL LOW BACK PAIN WITHOUT SCIATICA: ICD-10-CM

## 2024-01-16 DIAGNOSIS — E66.01 MORBID OBESITY (H): ICD-10-CM

## 2024-01-16 DIAGNOSIS — M54.50 CHRONIC BILATERAL LOW BACK PAIN WITHOUT SCIATICA: ICD-10-CM

## 2024-01-16 DIAGNOSIS — M1A.4720 CHRONIC GOUT DUE TO OTHER SECONDARY CAUSE INVOLVING TOE OF LEFT FOOT WITHOUT TOPHUS: ICD-10-CM

## 2024-01-16 DIAGNOSIS — H90.6 MIXED CONDUCTIVE AND SENSORINEURAL HEARING LOSS OF BOTH EARS: ICD-10-CM

## 2024-01-16 DIAGNOSIS — E06.3 HYPOTHYROIDISM DUE TO HASHIMOTO'S THYROIDITIS: ICD-10-CM

## 2024-01-16 DIAGNOSIS — F32.9 REACTIVE DEPRESSION: ICD-10-CM

## 2024-01-16 DIAGNOSIS — E78.5 HYPERLIPIDEMIA LDL GOAL <130: ICD-10-CM

## 2024-01-16 DIAGNOSIS — J45.20 ASTHMA, WELL CONTROLLED, MILD INTERMITTENT: ICD-10-CM

## 2024-01-16 DIAGNOSIS — Z00.00 ROUTINE GENERAL MEDICAL EXAMINATION AT A HEALTH CARE FACILITY: Primary | ICD-10-CM

## 2024-01-16 DIAGNOSIS — K21.00 GASTROESOPHAGEAL REFLUX DISEASE WITH ESOPHAGITIS, UNSPECIFIED WHETHER HEMORRHAGE: ICD-10-CM

## 2024-01-16 PROCEDURE — 99214 OFFICE O/P EST MOD 30 MIN: CPT | Mod: 25 | Performed by: FAMILY MEDICINE

## 2024-01-16 PROCEDURE — 99396 PREV VISIT EST AGE 40-64: CPT | Performed by: FAMILY MEDICINE

## 2024-01-16 PROCEDURE — 96127 BRIEF EMOTIONAL/BEHAV ASSMT: CPT | Performed by: FAMILY MEDICINE

## 2024-01-16 RX ORDER — ALBUTEROL SULFATE 90 UG/1
1-2 AEROSOL, METERED RESPIRATORY (INHALATION) EVERY 4 HOURS PRN
Qty: 18 G | Refills: 0 | Status: SHIPPED | OUTPATIENT
Start: 2024-01-16 | End: 2024-01-17

## 2024-01-16 RX ORDER — ALLOPURINOL 100 MG/1
100 TABLET ORAL DAILY
Qty: 90 TABLET | Refills: 3 | Status: SHIPPED | OUTPATIENT
Start: 2024-01-16

## 2024-01-16 RX ORDER — CETIRIZINE HYDROCHLORIDE 10 MG/1
10 TABLET ORAL DAILY
Status: CANCELLED | OUTPATIENT
Start: 2024-01-16

## 2024-01-16 RX ORDER — ALBUTEROL SULFATE 90 UG/1
2 AEROSOL, METERED RESPIRATORY (INHALATION) EVERY 6 HOURS PRN
Qty: 18 G | Refills: 11 | Status: SHIPPED | OUTPATIENT
Start: 2024-01-16 | End: 2024-01-16

## 2024-01-16 RX ORDER — DESVENLAFAXINE 50 MG/1
50 TABLET, FILM COATED, EXTENDED RELEASE ORAL DAILY
COMMUNITY
Start: 2024-01-16 | End: 2024-01-21

## 2024-01-16 RX ORDER — LEVOTHYROXINE SODIUM 50 UG/1
50 TABLET ORAL DAILY
Qty: 90 TABLET | Refills: 1 | Status: CANCELLED | OUTPATIENT
Start: 2024-01-16

## 2024-01-16 ASSESSMENT — ENCOUNTER SYMPTOMS
HEMATOCHEZIA: 0
EYE PAIN: 0
HEADACHES: 1
JOINT SWELLING: 0
SHORTNESS OF BREATH: 0
FREQUENCY: 0
DYSURIA: 0
ARTHRALGIAS: 0
WEAKNESS: 0
HEMATURIA: 0
NAUSEA: 0
HEARTBURN: 1
ABDOMINAL PAIN: 0
COUGH: 0
CONSTIPATION: 0
PALPITATIONS: 0
SORE THROAT: 0
CHILLS: 0
DIARRHEA: 0
DIZZINESS: 0
MYALGIAS: 0
PARESTHESIAS: 0
FEVER: 0
NERVOUS/ANXIOUS: 1

## 2024-01-16 ASSESSMENT — ANXIETY QUESTIONNAIRES
7. FEELING AFRAID AS IF SOMETHING AWFUL MIGHT HAPPEN: NOT AT ALL
6. BECOMING EASILY ANNOYED OR IRRITABLE: NOT AT ALL
1. FEELING NERVOUS, ANXIOUS, OR ON EDGE: SEVERAL DAYS
GAD7 TOTAL SCORE: 3
IF YOU CHECKED OFF ANY PROBLEMS ON THIS QUESTIONNAIRE, HOW DIFFICULT HAVE THESE PROBLEMS MADE IT FOR YOU TO DO YOUR WORK, TAKE CARE OF THINGS AT HOME, OR GET ALONG WITH OTHER PEOPLE: NOT DIFFICULT AT ALL
3. WORRYING TOO MUCH ABOUT DIFFERENT THINGS: SEVERAL DAYS
2. NOT BEING ABLE TO STOP OR CONTROL WORRYING: NOT AT ALL
5. BEING SO RESTLESS THAT IT IS HARD TO SIT STILL: NOT AT ALL
GAD7 TOTAL SCORE: 3

## 2024-01-16 ASSESSMENT — PATIENT HEALTH QUESTIONNAIRE - PHQ9
5. POOR APPETITE OR OVEREATING: SEVERAL DAYS
10. IF YOU CHECKED OFF ANY PROBLEMS, HOW DIFFICULT HAVE THESE PROBLEMS MADE IT FOR YOU TO DO YOUR WORK, TAKE CARE OF THINGS AT HOME, OR GET ALONG WITH OTHER PEOPLE: NOT DIFFICULT AT ALL
SUM OF ALL RESPONSES TO PHQ QUESTIONS 1-9: 3
SUM OF ALL RESPONSES TO PHQ QUESTIONS 1-9: 3

## 2024-01-16 ASSESSMENT — PAIN SCALES - GENERAL: PAINLEVEL: NO PAIN (0)

## 2024-01-16 NOTE — PATIENT INSTRUCTIONS
The 10-year ASCVD risk score (Briseida ALVAREZ, et al., 2019) is: 3.4%    Values used to calculate the score:      Age: 38 years      Sex: Male      Is Non- : No      Diabetic: No      Tobacco smoker: No      Systolic Blood Pressure: 126 mmHg      Is BP treated: No      HDL Cholesterol: 33 mg/dL      Total Cholesterol: 263 mg/dL    110 hours intermitenet fasting    Make a lab only appointment for fasting lipid and fasting glucose in near future    Recent Labs   Lab Test 06/13/22  1105 06/14/21  1037   CHOL 263* 313*   HDL 33* 42   * 223*   TRIG 260* 242*          Preventive Health Recommendations  Male Ages 40 to 49    Yearly exam:             See your health care provider every year in order to  o   Review health changes.   o   Discuss preventive care.    o   Review your medicines if your doctor has prescribed any.  You should be tested each year for STDs (sexually transmitted diseases) if you re at risk.   Have a cholesterol test every 5 years.   Have a colonoscopy (test for colon cancer) beginning at age 45.  Ask your provider about other options like a yearly FIT test or Cologuard test every 3 years (stool tests)   After age 45, have a diabetes test (fasting glucose). If you are at risk for diabetes, you should have this test every 3 years.    Talk with your health care provider about whether or not a prostate cancer screening test (PSA) is right for you.    Shots: Get a flu shot each year. Get a tetanus shot every 10 years.     Nutrition:  Eat at least 5 servings of fruits and vegetables daily.   Eat whole-grain bread, whole-wheat pasta and brown rice instead of white grains and rice.   Get adequate Calcium and Vitamin D.     Lifestyle  Exercise for at least 150 minutes a week (30 minutes a day, 5 days a week). This will help you control your weight and prevent disease.   Limit alcohol to one drink per day.   No smoking.   Wear sunscreen to prevent skin cancer.   See your dentist every  six months for an exam and cleaning.

## 2024-01-16 NOTE — PROGRESS NOTES
SUBJECTIVE:   Eliezer is a 38 year old, presenting for the following:  Physical        Healthy Habits:     Getting at least 3 servings of Calcium per day:  Yes    Bi-annual eye exam:  NO    Dental care twice a year:  Yes    Sleep apnea or symptoms of sleep apnea:  Excessive snoring    Diet:  Other    Frequency of exercise:  2-3 days/week    Duration of exercise:  15-30 minutes    Taking medications regularly:  No    Barriers to taking medications:  Not applicable    Medication side effects:  Not applicable    Additional concerns today:  Yes  Multiple concerns  On going lower back pain  2 flare up  Each flare up- mid back pain- sudden, moderate to sever- was not able to even get out of bed  Was seen in UC  Was told probably herniated disc and no specific treatment  Has another episode Thanks giving NOV, 2023   Was again incapacitated and remained in bed for > 7 days  Did not go to be sen in UC or emergency department because assumed they would ask him to monitor without specific treatment   Back pain is mid lower back localized without radiation to hip or legs.  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  Always has nausea and headache   Eating food makes it better  Complete Gastroenterology work up- negative for specific cause of nausea  Only food helps it temporarily    Concerned about subsequent and consistent weight gain  Wondering about ozempic for wt management.  Decreased, often asking others to repear changed  Wants hearing tested        Anxiety- a lot more controlled  Had appointment with Savanna pierre for medications management. Appointment completed 12/2023  Currently in best medications combination that he has tolerated      Migraine headache  Botox helps for 2 months   Sol iLnares MD ENDO/Cliff Peña MD - in 2 weeks and will do labs     Today's PHQ-9 Score:       1/16/2024     2:49 PM   PHQ-9  "SCORE   PHQ-9 Total Score MyChart 3 (Minimal depression)   PHQ-9 Total Score 3             Social History     Tobacco Use    Smoking status: Never    Smokeless tobacco: Never   Substance Use Topics    Alcohol use: Not Currently     Comment: 2-3x week             1/16/2024     2:51 PM   Alcohol Use   Prescreen: >3 drinks/day or >7 drinks/week? Not Applicable       Last PSA: No results found for: \"PSA\"    Reviewed orders with patient. Reviewed health maintenance and updated orders accordingly - Yes  BP Readings from Last 3 Encounters:   01/16/24 126/81   07/13/23 121/83   05/31/23 120/82    Wt Readings from Last 3 Encounters:   01/16/24 121.3 kg (267 lb 8 oz)   07/13/23 122.5 kg (270 lb)   05/31/23 117.9 kg (260 lb)                    Reviewed and updated as needed this visit by clinical staff   Tobacco  Allergies  Meds  Problems  Med Hx  Surg Hx  Fam Hx          Reviewed and updated as needed this visit by Provider   Tobacco  Allergies  Meds  Problems  Med Hx  Surg Hx  Fam Hx             Review of Systems   Constitutional:  Negative for chills and fever.   HENT:  Positive for hearing loss. Negative for congestion, ear pain and sore throat.    Eyes:  Negative for pain and visual disturbance.   Respiratory:  Negative for cough and shortness of breath.    Cardiovascular:  Negative for chest pain, palpitations and peripheral edema.   Gastrointestinal:  Positive for heartburn. Negative for abdominal pain, constipation, diarrhea, hematochezia and nausea.   Genitourinary:  Negative for dysuria, frequency, genital sores, hematuria, impotence, penile discharge and urgency.   Musculoskeletal:  Negative for arthralgias, joint swelling and myalgias.   Skin:  Negative for rash.   Neurological:  Positive for headaches. Negative for dizziness, weakness and paresthesias.   Psychiatric/Behavioral:  Negative for mood changes. The patient is nervous/anxious.    Aniety, migraine and   Nausea and headache- unless eatg carola " "meals  More headache - if without meals and calories for     Weight on mind   Lower back pain - mid back   Remained n bed for days     Feet always cold  Wants hearing test     OBJECTIVE:   /81   Pulse 91   Temp 97.2  F (36.2  C) (Temporal)   Resp 18   Ht 1.816 m (5' 11.5\")   Wt 121.3 kg (267 lb 8 oz)   SpO2 98%   BMI 36.79 kg/m      Physical Exam  GENERAL: healthy, alert and no distress  EYES: Eyes grossly normal to inspection, PERRL and conjunctivae and sclerae normal  HENT: ear canals and TM's normal, nose and mouth without ulcers or lesions  NECK: no adenopathy, no asymmetry, masses, or scars and thyroid normal to palpation  RESP: lungs clear to auscultation - no rales, rhonchi or wheezes  CV: regular rate and rhythm, normal S1 S2, no S3 or S4, no murmur, click or rub, no peripheral edema and peripheral pulses strong  ABDOMEN: soft, nontender, no hepatosplenomegaly, no masses and bowel sounds normal  MS: no gross musculoskeletal defects noted, no edema  SKIN: no suspicious lesions or rashes  NEURO: Normal strength and tone, mentation intact and speech normal  PSYCH: mentation appears normal, affect normal/bright  .      9/20/2022     5:54 PM 11/14/2022    11:16 AM 1/16/2024     2:49 PM   RICHARD-7 SCORE   Total Score 9 4 3            9/20/2022     5:54 PM 11/14/2022    11:16 AM 1/16/2024     2:49 PM   PHQ   PHQ-9 Total Score 7 3 3   Q9: Thoughts of better off dead/self-harm past 2 weeks Not at all Not at all Not at all      Diagnostic Test Results:  Labs reviewed in Epic  none     ASSESSMENT/PLAN:   Eliezer was seen today for physical.    Diagnoses and all orders for this visit:    Routine general medical examination at a health care facility    Chronic bilateral low back pain without sciatica  Comments:  Exam reassuring  Very long discussion about possible causes most likley posture and muscle strain  DDX does inlcude DJD however the first line of treatment is PHYSICAL THERAPY  Advised to start PT.  exam " not consistent with sciatica  OK to consider MRI- if pain worse with PT  Orders:  -     MR Lumbar Spine w/o Contrast; Future  -     Physical Therapy Referral; Future    Mixed conductive and sensorineural hearing loss of both ears  Comments:  referral given  Orders:  -     Adult Audiology  Referral; Future    Morbid obesity (H)  Comments:  referral given  unfortunatlely nausea of undiagnosed cause is adding to weight gain. Needs food psycologist  Orders:  -     Adult Comprehensive Weight Management  Referral; Future    Asthma, well controlled, mild intermittent  Comments:  ACT- reviewed.  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    Reactive depression and anxiety- well managed   Comments:  well managed on current-pristiq 50mg qd . 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 #30 given 2/2023  follows -Savanna pierre  Orders:  -     EMOTIONAL / BEHAVIORAL ASSESSMENT      9/20/2022     5:54 PM 11/14/2022    11:16 AM 1/16/2024     2:49 PM   PHQ   PHQ-9 Total Score 7 3 3   Q9: Thoughts of better off dead/self-harm past 2 weeks Not at all Not at all Not at all         Anxiety  -     EMOTIONAL / BEHAVIORAL ASSESSMENT      9/20/2022     5:54 PM 11/14/2022    11:16 AM 1/16/2024     2:49 PM   RICHARD-7 SCORE   Total Score 9 4 3         Hyperlipidemia LDL goal <130  Comments:  fasting labs  "advised  Orders:  -     Lipid panel reflex to direct LDL Fasting; Future  The 10-year ASCVD risk score (Briseida ALVAREZ, et al., 2019) is: 3.4%    Values used to calculate the score:      Age: 38 years      Sex: Male      Is Non- : No      Diabetic: No      Tobacco smoker: No      Systolic Blood Pressure: 126 mmHg      Is BP treated: No      HDL Cholesterol: 33 mg/dL      Total Cholesterol: 263 mg/dL    Hypothyroidism due to Hashimoto's thyroiditis  Comments:  has labs / [facility] - ENDO/Cliff Peña MD.  Levothyroxine at 50 mcg  Orders:  -     TSH with free T4 reflex; Future    Gastroesophageal reflux disease with esophagitis, unspecified whether hemorrhage  Comments:  PPI occassionally. EGD- normal    Elevated BP without diagnosis of hypertension  Comments:  Recheck BP is improved.  Monitor BP  Orders:  -     Comprehensive metabolic panel (BMP + Alb, Alk Phos, ALT, AST, Total. Bili, TP); Future  -     Albumin Random Urine Quantitative with Creat Ratio; Future    Other orders  -     PRIMARY CARE FOLLOW-UP SCHEDULING; Future  -     HEPATITIS B, ADULT 20+ (ENGERIX-B/RECOMBIVAX HB)  -     REVIEW OF HEALTH MAINTENANCE PROTOCOL ORDERS        Patient has been advised of split billing requirements and indicates understanding: Yes      COUNSELING:   Reviewed preventive health counseling, as reflected in patient instructions       Regular exercise       Healthy diet/nutrition       Vision screening       Alcohol Use        Family planning       Colorectal cancer screening       Prostate cancer screening       Advance Care Planning      BMI:   Estimated body mass index is 36.79 kg/m  as calculated from the following:    Height as of this encounter: 1.816 m (5' 11.5\").    Weight as of this encounter: 121.3 kg (267 lb 8 oz).   Weight management plan: Discussed healthy diet and exercise guidelines      He reports that he has never smoked. He has never used smokeless tobacco.            Ursula Nogueira " MD [facility]  [facility]  Answers submitted by the patient for this visit:  Patient Health Questionnaire (Submitted on 1/16/2024)  If you checked off any problems, how difficult have these problems made it for you to do your work, take care of things at home, or get along with other people?: Not difficult at all  PHQ9 TOTAL SCORE: 3

## 2024-01-17 RX ORDER — ALBUTEROL SULFATE 90 UG/1
AEROSOL, METERED RESPIRATORY (INHALATION)
Qty: 8 G | Refills: 0 | Status: SHIPPED | OUTPATIENT
Start: 2024-01-17 | End: 2024-01-23

## 2024-01-17 RX ORDER — ALBUTEROL SULFATE 90 UG/1
AEROSOL, METERED RESPIRATORY (INHALATION)
Qty: 8 G | Refills: 11 | Status: SHIPPED | OUTPATIENT
Start: 2024-01-17

## 2024-01-21 ENCOUNTER — LAB (OUTPATIENT)
Dept: LAB | Facility: CLINIC | Age: 39
End: 2024-01-21
Payer: COMMERCIAL

## 2024-01-21 ENCOUNTER — MYC REFILL (OUTPATIENT)
Dept: FAMILY MEDICINE | Facility: CLINIC | Age: 39
End: 2024-01-21

## 2024-01-21 DIAGNOSIS — E06.3 HYPOTHYROIDISM DUE TO HASHIMOTO'S THYROIDITIS: ICD-10-CM

## 2024-01-21 DIAGNOSIS — M1A.4720 CHRONIC GOUT DUE TO OTHER SECONDARY CAUSE INVOLVING TOE OF LEFT FOOT WITHOUT TOPHUS: ICD-10-CM

## 2024-01-21 DIAGNOSIS — E78.5 HYPERLIPIDEMIA LDL GOAL <130: ICD-10-CM

## 2024-01-21 DIAGNOSIS — R03.0 ELEVATED BP WITHOUT DIAGNOSIS OF HYPERTENSION: ICD-10-CM

## 2024-01-21 DIAGNOSIS — F32.9 REACTIVE DEPRESSION: ICD-10-CM

## 2024-01-21 DIAGNOSIS — F41.9 ANXIETY: ICD-10-CM

## 2024-01-21 LAB
ALBUMIN SERPL BCG-MCNC: 4.6 G/DL (ref 3.5–5.2)
ALP SERPL-CCNC: 88 U/L (ref 40–150)
ALT SERPL W P-5'-P-CCNC: 33 U/L (ref 0–70)
ANION GAP SERPL CALCULATED.3IONS-SCNC: 8 MMOL/L (ref 7–15)
AST SERPL W P-5'-P-CCNC: 37 U/L (ref 0–45)
BILIRUB SERPL-MCNC: 0.4 MG/DL
BUN SERPL-MCNC: 14.9 MG/DL (ref 6–20)
CALCIUM SERPL-MCNC: 9.5 MG/DL (ref 8.6–10)
CHLORIDE SERPL-SCNC: 103 MMOL/L (ref 98–107)
CHOLEST SERPL-MCNC: 277 MG/DL
CREAT SERPL-MCNC: 0.84 MG/DL (ref 0.67–1.17)
CREAT UR-MCNC: 218 MG/DL
DEPRECATED HCO3 PLAS-SCNC: 27 MMOL/L (ref 22–29)
EGFRCR SERPLBLD CKD-EPI 2021: >90 ML/MIN/1.73M2
FASTING STATUS PATIENT QL REPORTED: YES
GLUCOSE SERPL-MCNC: 91 MG/DL (ref 70–99)
HDLC SERPL-MCNC: 33 MG/DL
LDLC SERPL CALC-MCNC: 198 MG/DL
MICROALBUMIN UR-MCNC: <12 MG/L
MICROALBUMIN/CREAT UR: NORMAL MG/G{CREAT}
NONHDLC SERPL-MCNC: 244 MG/DL
POTASSIUM SERPL-SCNC: 4.5 MMOL/L (ref 3.4–5.3)
PROT SERPL-MCNC: 7.4 G/DL (ref 6.4–8.3)
SODIUM SERPL-SCNC: 138 MMOL/L (ref 135–145)
T4 FREE SERPL-MCNC: 0.91 NG/DL (ref 0.9–1.7)
TRIGL SERPL-MCNC: 229 MG/DL
TSH SERPL DL<=0.005 MIU/L-ACNC: 4.57 UIU/ML (ref 0.3–4.2)
URATE SERPL-MCNC: 5.9 MG/DL (ref 3.4–7)

## 2024-01-21 PROCEDURE — 36415 COLL VENOUS BLD VENIPUNCTURE: CPT

## 2024-01-21 PROCEDURE — 82043 UR ALBUMIN QUANTITATIVE: CPT

## 2024-01-21 PROCEDURE — 84439 ASSAY OF FREE THYROXINE: CPT

## 2024-01-21 PROCEDURE — 82570 ASSAY OF URINE CREATININE: CPT

## 2024-01-21 PROCEDURE — 84550 ASSAY OF BLOOD/URIC ACID: CPT

## 2024-01-21 PROCEDURE — 84443 ASSAY THYROID STIM HORMONE: CPT

## 2024-01-21 PROCEDURE — 80053 COMPREHEN METABOLIC PANEL: CPT

## 2024-01-21 PROCEDURE — 80061 LIPID PANEL: CPT

## 2024-01-22 RX ORDER — DESVENLAFAXINE 50 MG/1
50 TABLET, FILM COATED, EXTENDED RELEASE ORAL DAILY
Qty: 90 TABLET | Refills: 0 | Status: SHIPPED | OUTPATIENT
Start: 2024-01-22 | End: 2024-05-29

## 2024-01-22 NOTE — PROGRESS NOTES
"Video visit    Start time 1:02, stop time 1:10; additional 7 minutes spent on the date of the encounter doing chart review, documentation and further activities as noted.     Provider location: [facility], [address] [place] [address] [address], [place] [address]    Patient location: patient home    Mode of transmission: video     Subjective:    Eliezer Ellis is a 38 year old male who presents to review thyroid function.    2016 - 2022: TSH mildly elevated (but <10) and free T4 normal on many blood draws. We suspect 2016 was the first time his TFTs were checked.     He was on LT4 (it appears 25 - 50 mcg daily) for a period around 5/2021 to spring of 2022.     4/28/2023: TSH 5.92, free T4 mildly low, TPO Ab 45 (ULN 35 international unit(s)/mL)    4/30/2023: rx LT4 50 mcg once daily, 7 days/week    9/1/2023: TSH 2.7    1/21/2024: TSH 4.57 (mildly elevated), free T4 normal     1/23/2024: Eliezer continues on levothyroxine 50 mcg, once daily, 7 days/week. He takes it appropriately to maximize absorption. Rare missed doses. He has not noticed a goiter or any neck nodularity. No use of biotin.      No FH of thyroid pathology. No FH of thyroid cancer. No known thyroid nodule. No prior thyroid US. No prior thyroid/neck surgery. No prior H/N radiation. No prior DALY therapy.     No use of amiodarone. No use of lithium.     1/26/2023: He has noticed fatigue. Currently 6'0\" and 260 # and his weight has increased slightly over the past few years. No prior weight loss surgeries/procedures. No prior use of weight loss medications. He has had ongoing nausea and has had an extensive GI evaluation for this.     Comorbidities: obesity, hepatic steatosis, gout, dyslipidemia. HbA1c 4.8% 6/2022, FPG 91 mg/dL 1/2024, sleep apnea    Objective:    Virtual visit, appears well, BMI 36.8 kg/m2    Assessment/Plan:    # Primary hypothyroidism (Hashimoto's)     We reviewed the natural history and pathophysiology of Hashimoto's. Increase " levothyroxine to 75 mcg once daily, 7 days/week. We reviewed how to take LT4 to maximize absorption. Repeat TSH ordered in 2-3 months. Eliezer will send me a My Chart when drawn and I'll review. If TSH is then at goal we'll turn his care back to his PCP.     # Medically complicated obesity    His PCP referred him to the [facility], and I support this as well.

## 2024-01-23 ENCOUNTER — VIRTUAL VISIT (OUTPATIENT)
Dept: ENDOCRINOLOGY | Facility: CLINIC | Age: 39
End: 2024-01-23
Payer: COMMERCIAL

## 2024-01-23 DIAGNOSIS — E66.01 CLASS 2 SEVERE OBESITY DUE TO EXCESS CALORIES WITH SERIOUS COMORBIDITY AND BODY MASS INDEX (BMI) OF 36.0 TO 36.9 IN ADULT (H): ICD-10-CM

## 2024-01-23 DIAGNOSIS — E06.3 HYPOTHYROIDISM DUE TO HASHIMOTO'S THYROIDITIS: Primary | ICD-10-CM

## 2024-01-23 DIAGNOSIS — E66.812 CLASS 2 SEVERE OBESITY DUE TO EXCESS CALORIES WITH SERIOUS COMORBIDITY AND BODY MASS INDEX (BMI) OF 36.0 TO 36.9 IN ADULT (H): ICD-10-CM

## 2024-01-23 DIAGNOSIS — G43.709 CHRONIC MIGRAINE WITHOUT AURA WITHOUT STATUS MIGRAINOSUS, NOT INTRACTABLE: ICD-10-CM

## 2024-01-23 PROCEDURE — 99213 OFFICE O/P EST LOW 20 MIN: CPT | Mod: 95 | Performed by: INTERNAL MEDICINE

## 2024-01-23 RX ORDER — LEVOTHYROXINE SODIUM 75 UG/1
75 TABLET ORAL DAILY
Qty: 60 TABLET | Refills: 1 | Status: SHIPPED | OUTPATIENT
Start: 2024-01-23 | End: 2024-05-29

## 2024-01-23 NOTE — LETTER
"    1/23/2024         RE: Eliezer Ellis  [address]  [facility] [address]        Dear Colleague,    Thank you for referring your patient, Eliezer Ellis, to the [facility]. Please see a copy of my visit note below.    Video visit    Start time 1:02, stop time 1:10; additional 7 minutes spent on the date of the encounter doing chart review, documentation and further activities as noted.     Provider location: [facility], [address] [place] [address] [address], [place] [address]    Patient location: patient home    Mode of transmission: video     Subjective:    Eliezer Ellis is a 38 year old male who presents to review thyroid function.    2016 - 2022: TSH mildly elevated (but <10) and free T4 normal on many blood draws. We suspect 2016 was the first time his TFTs were checked.     He was on LT4 (it appears 25 - 50 mcg daily) for a period around 5/2021 to spring of 2022.     4/28/2023: TSH 5.92, free T4 mildly low, TPO Ab 45 (ULN 35 international unit(s)/mL)    4/30/2023: rx LT4 50 mcg once daily, 7 days/week    9/1/2023: TSH 2.7    1/21/2024: TSH 4.57 (mildly elevated), free T4 normal     1/23/2024: Eliezer continues on levothyroxine 50 mcg, once daily, 7 days/week. He takes it appropriately to maximize absorption. Rare missed doses. He has not noticed a goiter or any neck nodularity. No use of biotin.      No FH of thyroid pathology. No FH of thyroid cancer. No known thyroid nodule. No prior thyroid US. No prior thyroid/neck surgery. No prior H/N radiation. No prior DALY therapy.     No use of amiodarone. No use of lithium.     1/26/2023: He has noticed fatigue. Currently 6'0\" and 260 # and his weight has increased slightly over the past few years. No prior weight loss surgeries/procedures. No prior use of weight loss medications. He has had ongoing nausea and has had an extensive GI evaluation for this.     Comorbidities: obesity, hepatic steatosis, gout, dyslipidemia. HbA1c " 4.8% 6/2022, FPG 91 mg/dL 1/2024, sleep apnea    Objective:    Virtual visit, appears well, BMI 36.8 kg/m2    Assessment/Plan:    # Primary hypothyroidism (Hashimoto's)     We reviewed the natural history and pathophysiology of Hashimoto's. Increase levothyroxine to 75 mcg once daily, 7 days/week. We reviewed how to take LT4 to maximize absorption. Repeat TSH ordered in 2-3 months. Eliezer will send me a My Chart when drawn and I'll review. If TSH is then at goal we'll turn his care back to his PCP.     # Medically complicated obesity    His PCP referred him to the [facility], and I support this as well.       Again, thank you for allowing me to participate in the care of your patient.        Sincerely,        Cliff Peña MD

## 2024-01-29 ENCOUNTER — OFFICE VISIT (OUTPATIENT)
Dept: NEUROLOGY | Facility: CLINIC | Age: 39
End: 2024-01-29
Payer: COMMERCIAL

## 2024-01-29 DIAGNOSIS — G43.709 CHRONIC MIGRAINE WITHOUT AURA WITHOUT STATUS MIGRAINOSUS, NOT INTRACTABLE: Primary | ICD-10-CM

## 2024-01-29 PROCEDURE — 64615 CHEMODENERV MUSC MIGRAINE: CPT | Performed by: PSYCHIATRY & NEUROLOGY

## 2024-01-29 NOTE — PROGRESS NOTES
Dx:        Insurance (Authorized # of Visits):   Urinary incontinence          Authorizing Physician: Dr. Paulie Barkley  Next MD visit: none scheduled  Fall Risk: standard         Precautions: n/a            Discharge Summary  Pt has attended 6 visits in [address] [place] [address] [facility]  Botulinum Toxin Procedure    Sol Linares MD  Headache Neurology    January 29, 2024    Procedure:  OnabotulinumtoxinA injections for chronic migraine  Indication:  Chronic migraine    Mr. Ellis suffers from severe intractable headaches.  He was referred by Dr. Linares for onabotulinumtoxinA injections for headache.  Risks, benefits, and alternatives were discussed.  All questions were answered and consent given.  He decided to proceed with the injections.      Prior to initiation of botulinum toxin injections, Mr. Ellis reported 20 headache days per month, with 20 severe headache days per month. His headaches are quite disabling and often interfere with his ability to function normally.     Last set of injections: October 30, 2023     He reports 6-8 headache days per month, with 6-8 severe headache days per month. Headaches are nearly daily since Botox wore off about 3 weeks ago.     He takes Tylenol and Nurtec as needed.     He has attempted other migraine prophylactic treatments in the past, which have included: Emgality, desvenlafaxine, topiramate, amitriptyline, buspirone, venlafaxine, propranolol.       He currently takes desvenlafaxine for headache prevention.    Mr. Ellis's pain was assessed prior to the procedure.  He rated his pain today as 4 out of 10.    Procedural Pause: Procedural pause was conducted to verify correct patient identity, procedure to be performed, correct side and site, correct patient position, and special requirements. Appropriate hand hygiene was utilized, and each injection site was prepped with alcohol wipe or Chloraprep swab.     Procedure Details: 200 units of onabotulinumtoxinA was diluted in 4 mL 0.9% normal saline. A total of 150 units of onabotulinumtoxinA were injected using 30 gauge 0.5 in needles into the muscles listed below. 50 units of onabotulinumtoxinA were wasted.     Injection Sites: Total = 150 units onabotulinumtoxinA      and Procerus  10  X with PF contractions   SB flex R knee   ITB on the R in side lying with roller 5 mins  Add with towel 10 x with PF contractions                Lateral side stepping with PF contraction Red band in bars 20 x   Flex and ext Red band 10 x each with PF c muscles - 5 units into the left and right corrugators and 5 units into the procerus (15 units total)    Frontalis muscles - 5 units into the left superior frontalis and 5 units into the right superior frontalis (2 injection sites per muscle) (10 units total)    Temporalis muscles - 12.5 units into the left temporalis muscle and 12.5 units into the right temporalis muscle (2 injection sites per muscle) (25 units total)    Occipitalis muscles - 12.5 units into the left occipitalis muscle and 12.5 units into the right occipitalis muscle (2 injection sites per muscle) (25 units total)    Splenius Capitis muscles - 12.5 units into the left splenius capitis muscle and 12.5 units into the right splenius capitis muscle (2 injection sites per muscle, divided into 2/3 anteriorly and 1/3 posteriorly) (25 units total)      Trapezius muscles - 25 units into the left trapezius muscle and 25 units into the right trapezius muscle (3 injection sites per muscle, divided 5 units, 10 units, 10 units, medial to lateral) (50 units total)    Mr. Ellis tolerated the procedure well without immediate complications.  He will follow up in clinic for assessment of the effectiveness of treatment.  He did not report any change in his pain level after the botulinumtoxinA injection procedure.    Sol Linares MD  Headache Neurology  [facility]

## 2024-01-29 NOTE — LETTER
1/29/2024       RE: Eliezer Ellis  [address]  [facility] [address]       Dear Colleague,    Thank you for referring your patient, Eliezer Ellis, to the [facility] [place] [facility]. Please see a copy of my visit note below.    January 29, 2024    Procedure:  OnabotulinumtoxinA injections for chronic migraine  Indication:  Chronic migraine    Mr. Ellis suffers from severe intractable headaches.  He was referred by Dr. Linares for onabotulinumtoxinA injections for headache.  Risks, benefits, and alternatives were discussed.  All questions were answered and consent given.  He decided to proceed with the injections.      Prior to initiation of botulinum toxin injections, Mr. Ellis reported 20 headache days per month, with 20 severe headache days per month. His headaches are quite disabling and often interfere with his ability to function normally.     Last set of injections: October 30, 2023     He reports 6-8 headache days per month, with 6-8 severe headache days per month. Headaches are nearly daily since Botox wore off about 3 weeks ago.     He takes Tylenol and Nurtec as needed.     He has attempted other migraine prophylactic treatments in the past, which have included: Emgality, desvenlafaxine, topiramate, amitriptyline, buspirone, venlafaxine, propranolol.       He currently takes desvenlafaxine for headache prevention.    Mr. Ellis's pain was assessed prior to the procedure.  He rated his pain today as 4 out of 10.    Procedural Pause: Procedural pause was conducted to verify correct patient identity, procedure to be performed, correct side and site, correct patient position, and special requirements. Appropriate hand hygiene was utilized, and each injection site was prepped with alcohol wipe or Chloraprep swab.     Procedure Details: 200 units of onabotulinumtoxinA was diluted in 4 mL 0.9% normal saline. A total of 150  units of onabotulinumtoxinA were injected using 30 gauge 0.5 in needles into the muscles listed below. 50 units of onabotulinumtoxinA were wasted.     Injection Sites: Total = 150 units onabotulinumtoxinA      and Procerus muscles - 5 units into the left and right corrugators and 5 units into the procerus (15 units total)    Frontalis muscles - 5 units into the left superior frontalis and 5 units into the right superior frontalis (2 injection sites per muscle) (10 units total)    Temporalis muscles - 12.5 units into the left temporalis muscle and 12.5 units into the right temporalis muscle (2 injection sites per muscle) (25 units total)    Occipitalis muscles - 12.5 units into the left occipitalis muscle and 12.5 units into the right occipitalis muscle (2 injection sites per muscle) (25 units total)    Splenius Capitis muscles - 12.5 units into the left splenius capitis muscle and 12.5 units into the right splenius capitis muscle (2 injection sites per muscle, divided into 2/3 anteriorly and 1/3 posteriorly) (25 units total)      Trapezius muscles - 25 units into the left trapezius muscle and 25 units into the right trapezius muscle (3 injection sites per muscle, divided 5 units, 10 units, 10 units, medial to lateral) (50 units total)    Mr. Ellis tolerated the procedure well without immediate complications.  He will follow up in clinic for assessment of the effectiveness of treatment.  He did not report any change in his pain level after the botulinumtoxinA injection procedure.        Again, thank you for allowing me to participate in the care of your patient.      Sincerely,    Sol Linares MD

## 2024-03-07 ENCOUNTER — THERAPY VISIT (OUTPATIENT)
Dept: PHYSICAL THERAPY | Facility: CLINIC | Age: 39
End: 2024-03-07
Attending: FAMILY MEDICINE
Payer: COMMERCIAL

## 2024-03-07 DIAGNOSIS — G89.29 CHRONIC BILATERAL LOW BACK PAIN WITHOUT SCIATICA: ICD-10-CM

## 2024-03-07 DIAGNOSIS — M54.50 CHRONIC BILATERAL LOW BACK PAIN WITHOUT SCIATICA: ICD-10-CM

## 2024-03-07 PROCEDURE — 97110 THERAPEUTIC EXERCISES: CPT | Mod: GP | Performed by: PHYSICAL THERAPIST

## 2024-03-07 PROCEDURE — 97161 PT EVAL LOW COMPLEX 20 MIN: CPT | Mod: GP | Performed by: PHYSICAL THERAPIST

## 2024-03-07 NOTE — PROGRESS NOTES
PHYSICAL THERAPY EVALUATION  Type of Visit: Evaluation    See electronic medical record for Abuse and Falls Screening details.    Subjective     Pt is a 39 year old male presenting with complaints of mid-low back pain.   The symptoms started about 2 years ago . He went to urgent care after this time but was not told to do anything specific. He has since experienced another severe flare up later 2023 and was bed bound for 7 days.   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   Pain is better with stretches, lying flat  Sleep Quality: overall fair. It was affected more when in in flare up.  Current level of activity: walking 2-3x/week. No strength   Red Flags: none  Goals of therapy: reduce flare ups, sit         Presenting condition or subjective complaint: Second episode of severe lower back pain happened in late 2023. While it has resolved since, at the the time, I stayed in bed for about a week.  Date of onset: 03/07/22    Relevant medical history: Asthma; High blood pressure; Mental Illness; Migraines or headaches; Overweight; Thyroid problems   Dates & types of surgery:      Prior diagnostic imaging/testing results:       Prior therapy history for the same diagnosis, illness or injury: No      Prior Level of Function  Transfers: Independent  Ambulation: Independent  ADL: Independent  IADL:     Living Environment  Social support: With a significant other or spouse   Type of home: House   Stairs to enter the home: Yes 4 Is there a railing: Yes   Ramp: No   Stairs inside the home: Yes   Is there a railing: Yes   Employment: Yes      Pain assessment: Pain present     Objective       LUMBAR SPINE EVALUATION    PAIN: Pain Level at Rest: 1/10  Pain Level with flare up: 8/10  Pain Location: lumbar spine  Pain Quality: Aching now and Sharp when in flare up  Pain Frequency: intermittent    POSTURE: rounded shoulders, slouched posture in chair        ROM Min, mod, max loss   Lumbar Flexion WFL- no change with repeated movement    Lumbar Extension Mod - no change with repeated movements   Lumbar Side Bend  Min R mod L   Torso Rotation (seated) WFL       Hip ROM screen:      LE Flexibility (Supine) ROM    Trunk Rotation WFL   Hamstring 90-90 <70 deg bilaterally    Piriformis Pain in LB    Single/Double Knee to Chest        MYOTOMES:    Left Right   T12-L3 (Hip Flexion) 4+ 4+   L2-4 (Quads)  5 5   L4 (Ankle DF) 5 5   L5 (Great Toe Ext)     S1 (Knee Flexion)   5     5     Functional Core/Gluteal Strength:   -Bridging: no pain, good control   -Lower Abdominal Activation: difficulties with activation initially but able to complete a pelvic tilt with cues. Challenge with LE movement   -Sidelying Leg Raise (glute med): 5-/5 B    Lumbar Mobility/Spring Testing: hypomobile lower lumbar. Pain with prone press up but better post  brief PA joint mobilizations       LUMBAR/HIP Special Tests:    Left Right   CELESTE Positive for LBP Positive for LBP   FADIR/Labrum/ERICA Negative  Negative    Femoral Nerve     Agnieszka's     Piriformis     Quadrant Testing     SLR Negative - tightness in hamstring  Negative - tightness in hamstring    Slump     Stork with Extension     Ismael                Assessment & Plan   CLINICAL IMPRESSIONS  Medical Diagnosis: Chronic bilateral low back pain without sciatica    Treatment Diagnosis: Low Back Pain   Impression/Assessment: Patient is a 39 year old male with low back complaints.  The following significant findings have been identified: Pain, Decreased ROM/flexibility, Decreased joint mobility, Decreased strength, Impaired muscle performance, Decreased activity tolerance, and Impaired posture. These impairments interfere with their ability to perform self care tasks, work tasks, recreational activities, driving , and community mobility as compared to previous level of function.     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 - getting up frequently- without significant increase in symptoms by end of day  Rationale: to maximize safety and independence with performance of ADLs and functional tasks  Target Date: 04/18/24  PT Goal 2  Goal Identifier: Self Management  Goal Description: Pt will be more confident in his ability to acoid recurring flare ups based on his strength and self management strategies  Rationale: to maximize safety and independence with self cares  Target Date: 04/18/24      Frequency of Treatment: 1x/week  Duration of Treatment: 6 weeks    Recommended Referrals to Other Professionals:   Education Assessment:   Learner/Method: Patient  Education Comments: Eager to participate in therapy    Risks and benefits of evaluation/treatment have been explained.   Patient/Family/caregiver agrees with Plan of Care.     Evaluation Time:     PT Eval, Low Complexity Minutes (76543): 20       Signing Clinician: Trisha Billings, PT      [facility]                                                                                   OUTPATIENT PHYSICAL THERAPY      PLAN OF TREATMENT FOR OUTPATIENT REHABILITATION   Patient's Last Name, First Name, Eliezer Sevilla YOB: 1985   Provider's Name   [facility]   Medical Record No.  [id number]     Onset Date: 03/07/22  Start of Care Date: 03/07/24     Medical Diagnosis:  Chronic bilateral low back pain without sciatica      PT  Treatment Diagnosis:  Low Back Pain Plan of Treatment  Frequency/Duration: 1x/week/ 6 weeks    Certification date from 03/07/24 to 04/18/24         See note for plan of treatment details and functional goals     Trisha Billings, PT                         I CERTIFY THE NEED FOR THESE SERVICES FURNISHED UNDER        THIS PLAN OF TREATMENT AND WHILE UNDER MY CARE     (Physician attestation of this document indicates review and certification of the therapy plan).              Referring Provider:  Ursula Huang    Initial Assessment  See Epic Evaluation- Start of Care Date: 03/07/24

## 2024-03-17 DIAGNOSIS — E06.3 HYPOTHYROIDISM DUE TO HASHIMOTO'S THYROIDITIS: ICD-10-CM

## 2024-03-18 RX ORDER — LEVOTHYROXINE SODIUM 75 UG/1
75 TABLET ORAL DAILY
Qty: 60 TABLET | Refills: 0 | OUTPATIENT
Start: 2024-03-18

## 2024-03-18 NOTE — TELEPHONE ENCOUNTER
Requested Prescriptions   Pending Prescriptions Disp Refills    levothyroxine (SYNTHROID/LEVOTHROID) 75 MCG tablet [Pharmacy Med Name: Levothyroxine Sodium 75mcg Tablet] 60 tablet 0     Sig: Take 1 tablet by mouth daily.       Thyroid Protocol Failed - 3/17/2024  1:02 AM        Failed - Normal TSH on file in past 12 months     Recent Labs   Lab Test 01/21/24  0954   TSH 4.57*              Passed - Patient is 12 years or older        Passed - Recent (12 mo) or future (30 days) visit within the authorizing provider's specialty     The patient must have completed an in-person or virtual visit within the past 12 months or has a future visit scheduled within the next 90 days with the authorizing provider s specialty.  Urgent care and e-visits do not quality as an office visit for this protocol.          Passed - Medication is active on med list

## 2024-04-05 ENCOUNTER — LAB (OUTPATIENT)
Dept: LAB | Facility: CLINIC | Age: 39
End: 2024-04-05
Payer: COMMERCIAL

## 2024-04-05 DIAGNOSIS — E06.3 HYPOTHYROIDISM DUE TO HASHIMOTO'S THYROIDITIS: ICD-10-CM

## 2024-04-05 LAB — TSH SERPL DL<=0.005 MIU/L-ACNC: 2.01 UIU/ML (ref 0.3–4.2)

## 2024-04-05 PROCEDURE — 36415 COLL VENOUS BLD VENIPUNCTURE: CPT | Performed by: PATHOLOGY

## 2024-04-05 PROCEDURE — 84443 ASSAY THYROID STIM HORMONE: CPT | Performed by: PATHOLOGY

## 2024-04-08 ENCOUNTER — DOCUMENTATION ONLY (OUTPATIENT)
Dept: ENDOCRINOLOGY | Facility: CLINIC | Age: 39
End: 2024-04-08
Payer: COMMERCIAL

## 2024-04-08 NOTE — PROGRESS NOTES
"# Primary hypothyroidism (Hashimoto's)      TSH is now at goal - see 4/5/2024 lab draw. Continue Levothyroxine 75 mcg once daily, 7 days/week. To maximize its absorption, take levothyroxine first thing in the morning on an empty stomach. Don't eat/drink anything besides water for 30 minutes after. Take any vitamins/minerals (e.g., calcium, iron, magnesium, etc.) 4 hours apart.    Because TSH is at goal Eliezer has \"graduated\" from Endocrine clinic and further thyroid follow-up can be with his primary physician.    I suggest a TSH every 6 - 12 months for life with a goal level in the reference range.    He should have his thyroid palpated on physical exam by his primary physician once early.    If there are significant changes in weight or Eliezer develops symptoms of thyrotoxicosis or hypothyroidism his TSH can be checked at that time.    Eliezer can be referred back to Endocrine as needed.  "

## 2024-04-22 ENCOUNTER — OFFICE VISIT (OUTPATIENT)
Dept: NEUROLOGY | Facility: CLINIC | Age: 39
End: 2024-04-22
Payer: COMMERCIAL

## 2024-04-22 DIAGNOSIS — G43.709 CHRONIC MIGRAINE WITHOUT AURA WITHOUT STATUS MIGRAINOSUS, NOT INTRACTABLE: Primary | ICD-10-CM

## 2024-04-22 PROCEDURE — 99207 PR NO CHARGE LOS: CPT | Performed by: PSYCHIATRY & NEUROLOGY

## 2024-04-22 PROCEDURE — 64615 CHEMODENERV MUSC MIGRAINE: CPT | Performed by: PSYCHIATRY & NEUROLOGY

## 2024-04-22 NOTE — PROGRESS NOTES
[facility]  Botulinum Toxin Procedure    Sol Linares MD  Headache Neurology    April 22, 2024    Procedure:  OnabotulinumtoxinA injections for chronic migraine  Indication:  Chronic migraine    Mr. Ellis suffers from severe intractable headaches.  He was referred by Dr. Linares for onabotulinumtoxinA injections for headache.  Risks, benefits, and alternatives were discussed.  All questions were answered and consent given.  He decided to proceed with the injections.      Prior to initiation of botulinum toxin injections, Mr. Ellis reported 20 headache days per month, with 20 severe headache days per month. His headaches are quite disabling and often interfere with his ability to function normally.     Last set of injections: January 29, 2024     He reports 6-8 headache days per month, with 0 severe headache days per month. Headaches are nearly daily since Botox wore off about 2-3 weeks ago.   **We will plan to get 200 units for his next set of injections.     He takes Tylenol and Nurtec as needed.     He has attempted other migraine prophylactic treatments in the past, which have included: Emgality, desvenlafaxine, topiramate, amitriptyline, buspirone, venlafaxine, propranolol.       He currently takes desvenlafaxine for headache prevention.    Mr. Ellis's pain was assessed prior to the procedure.  He rated his pain today as 3 out of 10.    Procedural Pause: Procedural pause was conducted to verify correct patient identity, procedure to be performed, correct side and site, correct patient position, and special requirements. Appropriate hand hygiene was utilized, and each injection site was prepped with alcohol wipe or Chloraprep swab.     Procedure Details: 200 units of onabotulinumtoxinA was diluted in 4 mL 0.9% normal saline. A total of 150 units of onabotulinumtoxinA were injected using 30 gauge 0.5 in needles into the muscles listed below. 50 units of onabotulinumtoxinA were wasted.     Injection Sites:  Total = 150 units onabotulinumtoxinA      and Procerus muscles - 5 units into the left and right corrugators and 5 units into the procerus (15 units total)    Frontalis muscles - 5 units into the left superior frontalis and 5 units into the right superior frontalis (2 injection sites per muscle) (10 units total)    Temporalis muscles - 12.5 units into the left temporalis muscle and 12.5 units into the right temporalis muscle (2 injection sites per muscle) (25 units total)    Occipitalis muscles - 12.5 units into the left occipitalis muscle and 12.5 units into the right occipitalis muscle (2 injection sites per muscle) (25 units total)    Splenius Capitis muscles - 12.5 units into the left splenius capitis muscle and 12.5 units into the right splenius capitis muscle (2 injection sites per muscle, divided into 2/3 anteriorly and 1/3 posteriorly) (25 units total)      Trapezius muscles - 25 units into the left trapezius muscle and 25 units into the right trapezius muscle (3 injection sites per muscle, divided 5 units, 10 units, 10 units, medial to lateral) (50 units total)    Mr. Ellis tolerated the procedure well without immediate complications.  He will follow up in clinic for assessment of the effectiveness of treatment.  He did not report any change in his pain level after the botulinumtoxinA injection procedure.    Sol Linares MD  Headache Neurology  [facility]

## 2024-04-22 NOTE — LETTER
4/22/2024       RE: Eliezer Ellis  [address]  [facility] [address]     Dear Colleague,    Thank you for referring your patient, Eliezer Ellis, to the [facility] [place]. Please see a copy of my visit note below.    [facility]  Botulinum Toxin Procedure    Sol Linares MD  Headache Neurology    April 22, 2024    Procedure:  OnabotulinumtoxinA injections for chronic migraine  Indication:  Chronic migraine    Mr. Ellis suffers from severe intractable headaches.  He was referred by Dr. Linares for onabotulinumtoxinA injections for headache.  Risks, benefits, and alternatives were discussed.  All questions were answered and consent given.  He decided to proceed with the injections.      Prior to initiation of botulinum toxin injections, Mr. Ellis reported 20 headache days per month, with 20 severe headache days per month. His headaches are quite disabling and often interfere with his ability to function normally.     Last set of injections: January 29, 2024     He reports 6-8 headache days per month, with 0 severe headache days per month. Headaches are nearly daily since Botox wore off about 2-3 weeks ago.   **We will plan to get 200 units for his next set of injections.     He takes Tylenol and Nurtec as needed.     He has attempted other migraine prophylactic treatments in the past, which have included: Emgality, desvenlafaxine, topiramate, amitriptyline, buspirone, venlafaxine, propranolol.       He currently takes desvenlafaxine for headache prevention.    Mr. Ellis's pain was assessed prior to the procedure.  He rated his pain today as 3 out of 10.    Procedural Pause: Procedural pause was conducted to verify correct patient identity, procedure to be performed, correct side and site, correct patient position, and special requirements. Appropriate hand hygiene was utilized, and each injection site was prepped  with alcohol wipe or Chloraprep swab.     Procedure Details: 200 units of onabotulinumtoxinA was diluted in 4 mL 0.9% normal saline. A total of 150 units of onabotulinumtoxinA were injected using 30 gauge 0.5 in needles into the muscles listed below. 50 units of onabotulinumtoxinA were wasted.     Injection Sites: Total = 150 units onabotulinumtoxinA      and Procerus muscles - 5 units into the left and right corrugators and 5 units into the procerus (15 units total)    Frontalis muscles - 5 units into the left superior frontalis and 5 units into the right superior frontalis (2 injection sites per muscle) (10 units total)    Temporalis muscles - 12.5 units into the left temporalis muscle and 12.5 units into the right temporalis muscle (2 injection sites per muscle) (25 units total)    Occipitalis muscles - 12.5 units into the left occipitalis muscle and 12.5 units into the right occipitalis muscle (2 injection sites per muscle) (25 units total)    Splenius Capitis muscles - 12.5 units into the left splenius capitis muscle and 12.5 units into the right splenius capitis muscle (2 injection sites per muscle, divided into 2/3 anteriorly and 1/3 posteriorly) (25 units total)      Trapezius muscles - 25 units into the left trapezius muscle and 25 units into the right trapezius muscle (3 injection sites per muscle, divided 5 units, 10 units, 10 units, medial to lateral) (50 units total)    Mr. Ellis tolerated the procedure well without immediate complications.  He will follow up in clinic for assessment of the effectiveness of treatment.  He did not report any change in his pain level after the botulinumtoxinA injection procedure.        Again, thank you for allowing me to participate in the care of your patient.      Sincerely,    Sol Linares MD

## 2024-05-04 DIAGNOSIS — Z87.19 HISTORY OF CANKER SORES: ICD-10-CM

## 2024-05-14 RX ORDER — FLUOCINONIDE GEL 0.5 MG/G
GEL TOPICAL 2 TIMES DAILY
Qty: 30 G | Refills: 0 | Status: SHIPPED | OUTPATIENT
Start: 2024-05-14

## 2024-05-28 DIAGNOSIS — F41.9 ANXIETY: ICD-10-CM

## 2024-05-28 DIAGNOSIS — F32.9 REACTIVE DEPRESSION: ICD-10-CM

## 2024-05-28 DIAGNOSIS — E06.3 HYPOTHYROIDISM DUE TO HASHIMOTO'S THYROIDITIS: ICD-10-CM

## 2024-05-28 NOTE — TELEPHONE ENCOUNTER
"Pt's pcp to manage.    Per notes from 4/2024:  To maximize its absorption, take levothyroxine first thing in the morning on an empty stomach. Don't eat/drink anything besides water for 30 minutes after. Take any vitamins/minerals (e.g., calcium, iron, magnesium, etc.) 4 hours apart.     Because TSH is at goal you have now \"graduated\" from Endocrine clinic and further thyroid follow-up can be with your primary physician.     I put a note in your chart recommending the following - for monitoring by your primary physician:  "

## 2024-05-29 RX ORDER — DESVENLAFAXINE 50 MG/1
50 TABLET, FILM COATED, EXTENDED RELEASE ORAL DAILY
Qty: 90 TABLET | Refills: 0 | Status: SHIPPED | OUTPATIENT
Start: 2024-05-29 | End: 2024-09-10

## 2024-05-29 RX ORDER — LEVOTHYROXINE SODIUM 75 UG/1
75 TABLET ORAL DAILY
Qty: 90 TABLET | Refills: 3 | Status: SHIPPED | OUTPATIENT
Start: 2024-05-29

## 2024-06-17 ENCOUNTER — TELEPHONE (OUTPATIENT)
Dept: ENDOCRINOLOGY | Facility: CLINIC | Age: 39
End: 2024-06-17
Payer: COMMERCIAL

## 2024-06-17 DIAGNOSIS — G43.709 CHRONIC MIGRAINE WITHOUT AURA WITHOUT STATUS MIGRAINOSUS, NOT INTRACTABLE: Primary | ICD-10-CM

## 2024-06-17 RX ORDER — METHYLPREDNISOLONE 4 MG
TABLET, DOSE PACK ORAL
Qty: 21 TABLET | Refills: 0 | Status: SHIPPED | OUTPATIENT
Start: 2024-06-17

## 2024-06-17 NOTE — TELEPHONE ENCOUNTER
General Call    Contacts         Type Contact Phone/Fax    06/17/2024 03:20 PM CDT Phone (Incoming) Eliezer Ellis (Self) [phone] (M)          Reason for Call:  please mych the pt the CPT codes for initial visits        Could we send this information to you in [employer] or would you prefer to receive a phone call?:   Patient would like to be contacted via [employer]

## 2024-06-24 ENCOUNTER — TELEPHONE (OUTPATIENT)
Dept: NEUROLOGY | Facility: CLINIC | Age: 39
End: 2024-06-24

## 2024-06-24 ENCOUNTER — NURSE TRIAGE (OUTPATIENT)
Dept: FAMILY MEDICINE | Facility: CLINIC | Age: 39
End: 2024-06-24
Payer: COMMERCIAL

## 2024-06-24 ENCOUNTER — TELEPHONE (OUTPATIENT)
Dept: ENDOCRINOLOGY | Facility: CLINIC | Age: 39
End: 2024-06-24
Payer: COMMERCIAL

## 2024-06-24 ENCOUNTER — OFFICE VISIT (OUTPATIENT)
Dept: URGENT CARE | Facility: URGENT CARE | Age: 39
End: 2024-06-24
Payer: COMMERCIAL

## 2024-06-24 ENCOUNTER — ANCILLARY PROCEDURE (OUTPATIENT)
Dept: GENERAL RADIOLOGY | Facility: CLINIC | Age: 39
End: 2024-06-24
Attending: PHYSICIAN ASSISTANT
Payer: COMMERCIAL

## 2024-06-24 VITALS
WEIGHT: 270 LBS | RESPIRATION RATE: 16 BRPM | TEMPERATURE: 97.2 F | OXYGEN SATURATION: 98 % | HEIGHT: 72 IN | SYSTOLIC BLOOD PRESSURE: 122 MMHG | BODY MASS INDEX: 36.57 KG/M2 | HEART RATE: 79 BPM | DIASTOLIC BLOOD PRESSURE: 82 MMHG

## 2024-06-24 DIAGNOSIS — K59.00 CONSTIPATION, UNSPECIFIED CONSTIPATION TYPE: ICD-10-CM

## 2024-06-24 DIAGNOSIS — R10.84 ABDOMINAL PAIN, GENERALIZED: Primary | ICD-10-CM

## 2024-06-24 PROCEDURE — 74019 RADEX ABDOMEN 2 VIEWS: CPT | Mod: TC | Performed by: INTERNAL MEDICINE

## 2024-06-24 PROCEDURE — 99214 OFFICE O/P EST MOD 30 MIN: CPT | Performed by: PHYSICIAN ASSISTANT

## 2024-06-24 RX ORDER — CALCIUM POLYCARBOPHIL 625 MG
TABLET ORAL DAILY
COMMUNITY

## 2024-06-24 RX ORDER — BISACODYL 5 MG/1
5 TABLET, DELAYED RELEASE ORAL DAILY PRN
Qty: 30 TABLET | Refills: 0 | Status: SHIPPED | OUTPATIENT
Start: 2024-06-24

## 2024-06-24 ASSESSMENT — ENCOUNTER SYMPTOMS
APPETITE CHANGE: 0
NAUSEA: 0
FEVER: 0
ABDOMINAL PAIN: 1
CONSTIPATION: 0
VOMITING: 0
HEADACHES: 1
DIARRHEA: 0
SORE THROAT: 0
BLOOD IN STOOL: 0
RECTAL PAIN: 0

## 2024-06-24 NOTE — TELEPHONE ENCOUNTER
Woke up this AM with a headache and abd pain. His headache has since resolved. He had a BM and stated that his stool was a little darker but solid. Used the restroom a little later and it was not dark but loose. Was recently on a steroid medication. ABD pain description is dull and constant rating it 4/10. Patient is going to Urgent Care.    Reason for Disposition   MILD TO MODERATE constant pain lasting > 2 hours    Additional Information   Negative: Passed out (i.e., fainted, collapsed and was not responding)   Negative: Shock suspected (e.g., cold/pale/clammy skin, too weak to stand, low BP, rapid pulse)   Negative: Sounds like a life-threatening emergency to the triager   Negative: Followed an abdomen (stomach) injury   Negative: Chest pain   Negative: SEVERE abdominal pain (e.g., excruciating)   Negative: Vomiting red blood or black (coffee ground) material   Negative: Blood in bowel movements  (Exception: Blood on surface of BM with constipation.)   Negative: Unable to urinate (or only a few drops) and bladder feels very full   Negative: Pain in scrotum persists > 1 hour    Protocols used: Abdominal Pain - Male-A-OH

## 2024-06-24 NOTE — PROGRESS NOTES
SUBJECTIVE:   Eliezer Ellis is a 39 year old male presenting with a chief complaint of   Chief Complaint   Patient presents with    Urgent Care    Headache    Abdominal Pain     Patient presents abdominal pain and a headache that started today. Patient was recently on a steroid. Patient reports darker and looser stool than usual.        He is an established patient of [facility].  Patient presents with abdominal pain that started upon awakening.    States dark stool with BM.  Took tylenol.  Loose stool at that time.  Abdominal cramps.  Above umbilicus, constant.  Dull.  No radiation.  Nothing better or worse.  Improved a bit.  2/10.  Eating does not impact.  Ate breakfast.  HA improved.  Recently finished steroids for migraine - finished yesterday.  Colonoscopy 1 year ago - no findings.  No hx of diverticulitis.    Treatment:  nurtec for HA, omeprazole  Surgeries:  none        Review of Systems   Constitutional:  Negative for appetite change and fever.   HENT:  Negative for sore throat.    Gastrointestinal:  Positive for abdominal pain. Negative for blood in stool, constipation, diarrhea, nausea, rectal pain and vomiting.   Neurological:  Positive for headaches.   All other systems reviewed and are negative.      Past Medical History:   Diagnosis Date    Anxiety     Anxiety 11/1/2013    Anxiety attack 11/1/2013    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 7/10/2017    Hyperlipidemia LDL goal <160 11/1/2013    Intermittent asthma 9/29/2011    reports no active problems    Seasonal allergies 9/29/2011     Family History   Problem Relation Age of Onset    Family History Negative Mother     Diabetes Mother         Gestational Diabetes 1969 (no late onset)    Hypertension Mother         Controlled by meds    Hyperlipidemia Mother         Controlled by meds    Other Cancer Mother         Basal cell skin cancers    Anesthesia Reaction Mother     Osteoporosis Mother         Osteopenia     Obesity Mother         Unsure if overweight or obese    Blood Disease Father 65        VTE, on coumadin    Hypertension Father         Controlled by meds    Hyperlipidemia Father         Controlled by meds    Other Cancer Father         Benign tumor on pituitary gland    Genetic Disorder Father         Mesenteric vein thrombosis - genetic. Test showed antithrombin 3 deficiency. Deep vein thrombosis, controlled by warfarin    Obesity Father         Unsure if overweight or obese    Diabetes Maternal Grandmother         Diabetes    Hypertension Maternal Grandmother     Breast Cancer Maternal Grandmother     Other Cancer Maternal Grandmother         Melanoma, Basal cell skin cancer    Genetic Disorder Maternal Grandmother         Parkinson's disease (don't know if genetic)    Coronary Artery Disease Maternal Grandfather         Angina, heart attack    Genetic Disorder Maternal Grandfather         Tata Gehrig's disease    Thyroid Disease Maternal Grandfather         Hypothyroidism    Coronary Artery Disease Paternal Grandfather         Bradycardia    Osteoporosis Paternal Grandfather     Genetic Disorder Paternal Grandfather         Blood clots - don't know if genetic    Hypertension Paternal Grandmother     Cerebrovascular Disease Paternal Grandmother     Osteoporosis Paternal Grandmother     Genetic Disorder Paternal Grandmother         Blood clot - don't know if genetic    Anxiety Disorder Sister     Obesity Sister      Current Outpatient Medications   Medication Sig Dispense Refill    albuterol (PROAIR HFA/PROVENTIL HFA/VENTOLIN HFA) 108 (90 Base) MCG/ACT inhaler Inhale 2 puffs by mouth into the lungs every 6 hours as needed for shortness of breath, wheezing or cough. 8 g 11    allopurinol (ZYLOPRIM) 100 MG tablet Take 1 tablet (100 mg) by mouth daily 90 tablet 3    ALPRAZolam (XANAX) 1 MG tablet Take 1 tablet (1 mg) by mouth once as needed for anxiety 15 tablet 0    Calcium Polycarbophil (FIBER) 625 MG tablet Take  by mouth daily      cetirizine (ZYRTEC) 10 MG tablet Take 10 mg by mouth daily.      desvenlafaxine (PRISTIQ) 50 MG 24 hr tablet Take 1 tablet by mouth daily. 90 tablet 0    fluocinonide (LIDEX) 0.05 % external gel Apply topically to the affected area twice daily. 30 g 0    levothyroxine (SYNTHROID/LEVOTHROID) 50 MCG tablet Take 1 tablet by mouth daily. 90 tablet 1    levothyroxine (SYNTHROID/LEVOTHROID) 75 MCG tablet Take 1 tablet by mouth daily. 90 tablet 3    omeprazole (PRILOSEC) 20 MG DR capsule Take 1 capsule (20 mg) by mouth daily 90 capsule 1    rimegepant (NURTEC) 75 MG ODT tablet Place 1 tablet (75 mg) under the tongue daily as needed for migraine Maximum of 1 tablet every 24 hours. 8 tablet 11    VITAMIN D PO Take 1,000 Units by mouth daily Pt takes 2 tabs daily in the AM      methylPREDNISolone (MEDROL DOSEPAK) 4 MG tablet therapy pack Follow Package Directions (Patient not taking: Reported on 6/24/2024) 21 tablet 0    order for DME Equipment being ordered: digital Blood pressure apparatus (Patient not taking: Reported on 6/24/2024) 1 Box 0     Social History     Tobacco Use    Smoking status: Never    Smokeless tobacco: Never   Substance Use Topics    Alcohol use: Not Currently     Comment: 2-3x week       OBJECTIVE  /82 (BP Location: Right arm)   Pulse 79   Temp 97.2  F (36.2  C) (Temporal)   Resp 16   Ht 1.829 m (6')   Wt 122.5 kg (270 lb)   SpO2 98%   BMI 36.62 kg/m      Physical Exam  Vitals and nursing note reviewed.   Constitutional:       General: He is not in acute distress.     Appearance: Normal appearance. He is obese. He is not ill-appearing, toxic-appearing or diaphoretic.   Eyes:      Extraocular Movements: Extraocular movements intact.      Conjunctiva/sclera: Conjunctivae normal.   Cardiovascular:      Rate and Rhythm: Normal rate and regular rhythm.      Pulses: Normal pulses.      Heart sounds: Normal heart sounds.   Pulmonary:      Effort: Pulmonary effort is normal.       Breath sounds: Normal breath sounds.   Abdominal:      General: Abdomen is flat.      Palpations: Abdomen is soft.      Tenderness: There is abdominal tenderness. There is no right CVA tenderness or left CVA tenderness.          Comments: Hypoactive BS.   Skin:     General: Skin is warm and dry.   Neurological:      Mental Status: He is alert.   Psychiatric:         Mood and Affect: Mood normal.         Labs:  No results found for this or any previous visit (from the past 24 hour(s)).    X-Ray was done, my findings are: Xrays reviewed by myself and independently interpreted.  Any significant discrepancies with official radiologic read, patient will be notified.        Moderate stool burden.  Nonspecific gas pattern.      ASSESSMENT:      ICD-10-CM    1. Abdominal pain, generalized  R10.84 XR Abdomen 2 Views           Medical Decision Making:    Differential Diagnosis:  Abdominal Pain: Constipation and Non Specific    Serious Comorbid Conditions:  Adult:   reviewed    PLAN:    Drink plenty of water.  Rx for dulcolax.  Discussed reasons to seek immediate medical attention.  Additionally if no improvement or worsening in one week, may follow up with PCP and/or UC.        Followup:    If not improving or if condition worsens, follow up with your Primary Care Provider, If not improving or if conditions worsens over the next 12-24 hours, go to the Emergency Department    There are no Patient Instructions on file for this visit.

## 2024-06-24 NOTE — TELEPHONE ENCOUNTER
Prior Authorization Retail Medication Request     Medication/Dose: Nurtec 75 mg  ICD code (if different than what is on RX):  G43.709  Previously Tried and Failed:  Emgality, desvenlafaxine, topiramate, amitriptyline, buspirone, venlafaxine, propranolol.  Naratriptan     Rationale:  He reports 10-12 headache days per month, with 10-12 severe headache days per month. He takes Tylenol and naratriptan as needed. Naratriptan is causing side effects.     Insurance Name: [employer] Self Insured  Insurance ID:  [id number]           Pharmacy Information (if different than what is on RX)  Name:    Phone:

## 2024-06-25 ENCOUNTER — VIRTUAL VISIT (OUTPATIENT)
Dept: ENDOCRINOLOGY | Facility: CLINIC | Age: 39
End: 2024-06-25
Payer: COMMERCIAL

## 2024-06-25 VITALS — BODY MASS INDEX: 36.57 KG/M2 | WEIGHT: 270 LBS | HEIGHT: 72 IN

## 2024-06-25 DIAGNOSIS — E66.812 CLASS 2 SEVERE OBESITY DUE TO EXCESS CALORIES WITH SERIOUS COMORBIDITY AND BODY MASS INDEX (BMI) OF 36.0 TO 36.9 IN ADULT (H): Primary | ICD-10-CM

## 2024-06-25 DIAGNOSIS — E66.01 CLASS 2 SEVERE OBESITY DUE TO EXCESS CALORIES WITH SERIOUS COMORBIDITY AND BODY MASS INDEX (BMI) OF 36.0 TO 36.9 IN ADULT (H): Primary | ICD-10-CM

## 2024-06-25 DIAGNOSIS — K76.0 HEPATIC STEATOSIS: ICD-10-CM

## 2024-06-25 PROCEDURE — G2211 COMPLEX E/M VISIT ADD ON: HCPCS | Mod: 95 | Performed by: INTERNAL MEDICINE

## 2024-06-25 PROCEDURE — 99214 OFFICE O/P EST MOD 30 MIN: CPT | Mod: 95 | Performed by: INTERNAL MEDICINE

## 2024-06-25 ASSESSMENT — PAIN SCALES - GENERAL: PAINLEVEL: MILD PAIN (2)

## 2024-06-25 NOTE — PROGRESS NOTES
"    New Medical Weight Management Consult    PATIENT:  Eliezer Ellis  MRN:         [id number]  :         1985  JOHNNY:         2024    Dear PCP,    I had the pleasure of seeing your patient, Eliezer Ellis. Full intake/assessment was done to determine barriers to weight loss success and develop a treatment plan. Eliezer Ellis is a 39 year old male interested in treatment of medical problems associated with excess weight. He has a height of 6' 0\", a weight of 270 lbs 0 oz, and the calculated Body mass index is 36.62 kg/m .    He has the following co-morbidities: hepatic steatosis, gout, dyslipidemia, JAVID, hypertension, migraine    Weight history: overweight for most of his life. Weight is up and down a lot. Lost weight during college and gained weight back as a adult. Lost weight again after cutting alcohol but slowly gained back after pandemic.  He found it very challenging to keep weight loss long term    Eating habit: mostly carbohydrate heavy and diary heavy in diet  Get hungry easily, snack often, stress and boredom eating   Proactively eat to prevent hunger and headache    Exercise: walking, start pickelball recently  Job: desk job     Was on topiramate from neurologist. No side effects but it did not help with migraine.        2024    10:24 PM   --   I have the following health issues associated with obesity High Blood Pressure    High Cholesterol    Sleep Apnea    Fatty Liver    Asthma    Hypothyroidism   I have the following symptoms associated with obesity Knee Pain    Depression    Back Pain    Fatigue         2024    10:24 PM   Referring Provider   Please name the provider who referred you to Medical Weight Management  If you do not know, please answer \"I Don't Know\" Dr. Huang           2024    10:24 PM   Weight History   How concerned are you about your weight? Very Concerned   I became overweight As a Child   The following factors have contributed to my weight gain Mental Health " Issues    Started on Medication that Caused Weight Gain    Lack of Exercise    Genetic (Runs in the Family)    Stress   I have tried the following methods to lose weight Watching Portions or Calories    Exercise   My lowest weight since age 16 was 200   My highest weight since age 16 was 275   I have the following family history of obesity/being overweight My mother is overweight    My father is overweight    One or more of my siblings are overweight    Many of my relatives are overweight   How has your weight changed over the last year? Gained           6/24/2024    10:24 PM   Diet Recall Review with Patient   If you do eat breakfast, what types of food do you eat? [place] sunrise cereal with frozen blueberries and milk   If you do eat lunch, what types of food do you typically eat? Varies. Leftovers, a sandwich, a quesadilla, frozen foods, etc.   If you do eat supper, what types of food do you typically eat? Variety of foods   If you do snack, what types of food do you typically eat? Popcorn, peanut butter pretzels, oranges, grapes, cheese puffs, chocolate covered pretzels   How many glasses of juice do you drink in a typical day? 0   How many of glasses of milk do you drink in a typical day? 0   How many 8oz glasses of sugar containing drinks such as Sharath-Aid/sweet tea do you drink in a day? 0   How many cans/bottles of sugar pop/soda/tea/sports drinks do you drink in a day? 0   How many cans/bottles of diet pop/soda/tea or sports drink do you drink in a day? 0   How often do you have a drink of alcohol? Never           6/24/2024    10:24 PM   Eating Habits   Generally, my meals include foods like these bread, pasta, rice, potatoes, corn, crackers, sweet dessert, pop, or juice Almost Everyday   Generally, my meals include foods like these fried meats, brats, burgers, french fries, pizza, cheese, chips, or ice cream A Few Times a Week   Eat fast food (like [employer]onal[employer], [employer], Taco Bell) Less Than Weekly   Eat at  a buffet or sit-down restaurant Less Than Weekly   Eat most of my meals in front of the TV or computer A Few Times a Week   Often skip meals, eat at random times, have no regular eating times Never   Rarely sit down for a meal but snack or graze throughout Never   Eat extra snacks between meals A Few Times a Week   Eat most of my food at the end of the day Less Than Weekly   Eat in the middle of the night or wake up at night to eat Less Than Weekly   Eat extra snacks to prevent or correct low blood sugar Less Than Weekly   Eat to prevent acid reflux or stomach pain Less Than Weekly   Worry about not having enough food to eat Never   I eat when I am depressed Never   I eat when I am stressed Once a Week   I eat when I am bored A Few Times a Week   I eat when I am anxious Less Than Weekly   I eat when I am happy or as a reward Once a Week   I feel hungry all the time even if I just have eaten Almost Everyday   Feeling full is important to me Almost Everyday   I finish all the food on my plate even if I am already full A Few Times a Week   I can't resist eating delicious food or walk past the good food/smell Once a Week   I eat/snack without noticing that I am eating Never   I eat when I am preparing the meal Once a Week   I eat more than usual when I see others eating Never   I have trouble not eating sweets, ice cream, cookies, or chips if they are around the house A Few Times a Week   I think about food all day Once a Week   What foods, if any, do you crave? Chips/Crackers           6/24/2024    10:24 PM   Amount of Food   I feel out of control when eating Never   I eat a large amount of food, like a loaf of bread, a box of cookies, a pint/quart of ice cream, all at once Never   I eat a large amount of food even when I am not hungry Never   I eat rapidly Almost Everyday   I eat alone because I feel embarrassed and do not want others to see how much I have eaten Never   I eat until I am uncomfortably full Monthly   I  feel bad, disgusted, or guilty after I overeat Monthly           6/24/2024    10:24 PM   Activity/Exercise History   How much of a typical 12 hour day do you spend sitting? Most of the Day   How much of a typical 12 hour day do you spend lying down? Less Than Half the Day   How much of a typical day do you spend walking/standing? Less Than Half the Day   How many hours (not including work) do you spend on the TV/Video Games/Computer/Tablet/Phone? 2-3 Hours   How many times a week are you active for the purpose of exercise? 2-3 Times a Week   What keeps you from being more active? Shortness of Breath    Lack of Time    Too tired    Unsure What To Do   How many total minutes do you spend doing some activity for the purpose of exercising when you exercise? 15-30 Minutes       PAST MEDICAL HISTORY:  Past Medical History:   Diagnosis Date    Anxiety     Anxiety 11/1/2013    Anxiety attack 11/1/2013    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 7/10/2017    Hyperlipidemia LDL goal <160 11/1/2013    Intermittent asthma 9/29/2011    reports no active problems    Seasonal allergies 9/29/2011 6/24/2024    10:24 PM   Work/Social History Reviewed With Patient   My employment status is Full-Time   My job is [employer]   How much of your job is spent on the computer or phone? 75%   How many hours do you spend commuting to work daily? 0   What is your marital status? /In a Relationship   If in a relationship, is your significant other overweight? Yes   Who do you live with? My partner   Who does the food shopping? Both of us           6/24/2024    10:24 PM   Mental Health History Reviewed With Patient   Have you ever been physically or sexually abused? No   How often in the past 2 weeks have you felt little interest or pleasure in doing things? Not at all   Over the past 2 weeks how often have you felt down, depressed, or hopeless? Not at all            6/24/2024    10:24 PM   Sleep History Reviewed With Patient   How many hours do you sleep at night? 8.5       MEDICATIONS:   Current Outpatient Medications   Medication Sig Dispense Refill    albuterol (PROAIR HFA/PROVENTIL HFA/VENTOLIN HFA) 108 (90 Base) MCG/ACT inhaler Inhale 2 puffs by mouth into the lungs every 6 hours as needed for shortness of breath, wheezing or cough. 8 g 11    allopurinol (ZYLOPRIM) 100 MG tablet Take 1 tablet (100 mg) by mouth daily 90 tablet 3    ALPRAZolam (XANAX) 1 MG tablet Take 1 tablet (1 mg) by mouth once as needed for anxiety 15 tablet 0    bisacodyl (DULCOLAX) 5 MG EC tablet Take 1 tablet (5 mg) by mouth daily as needed for constipation 30 tablet 0    Calcium Polycarbophil (FIBER) 625 MG tablet Take by mouth daily      cetirizine (ZYRTEC) 10 MG tablet Take 10 mg by mouth daily.      desvenlafaxine (PRISTIQ) 50 MG 24 hr tablet Take 1 tablet by mouth daily. 90 tablet 0    fluocinonide (LIDEX) 0.05 % external gel Apply topically to the affected area twice daily. 30 g 0    levothyroxine (SYNTHROID/LEVOTHROID) 75 MCG tablet Take 1 tablet by mouth daily. 90 tablet 3    omeprazole (PRILOSEC) 20 MG DR capsule Take 1 capsule (20 mg) by mouth daily 90 capsule 1    rimegepant (NURTEC) 75 MG ODT tablet Place 1 tablet (75 mg) under the tongue daily as needed for migraine Maximum of 1 tablet every 24 hours. 8 tablet 11    VITAMIN D PO Take 1,000 Units by mouth daily Pt takes 2 tabs daily in the AM      levothyroxine (SYNTHROID/LEVOTHROID) 50 MCG tablet Take 1 tablet by mouth daily. 90 tablet 1    methylPREDNISolone (MEDROL DOSEPAK) 4 MG tablet therapy pack Follow Package Directions (Patient not taking: Reported on 6/24/2024) 21 tablet 0    order for DME Equipment being ordered: digital Blood pressure apparatus (Patient not taking: Reported on 6/24/2024) 1 Box 0       ALLERGIES:   Allergies   Allergen Reactions    Dust Mites Shortness Of Breath    Mold Shortness Of Breath    Cats  Itching    Other Food Allergy Difficulty breathing, Headache and Visual Disturbance    Pcn [Penicillins]     Seasonal Allergies        PHYSICAL EXAM:  Ht 1.829 m (6')   Wt 122.5 kg (270 lb)   BMI 36.62 kg/m      Waist circumference:      Wt Readings from Last 4 Encounters:   06/25/24 122.5 kg (270 lb)   06/24/24 122.5 kg (270 lb)   01/16/24 121.3 kg (267 lb 8 oz)   07/13/23 122.5 kg (270 lb)     A & O x 3  HEENT: NCAT, mucous membranes moist  Respirations unlabored  Location of obesity: Mixed Obesity    Lab  Lab Results   Component Value Date    A1C 4.8 06/13/2022    A1C 4.6 09/27/2018       ASSESSMENT/PLAN:  Eliezer is a patient with mature onset obesity with significant element of familial/genetic influence and with current health consequences. He does not need aggressive weight loss plan.  Eliezer Ellis eats a high carb diet, uses food as mood management, has perception of intense hunger, and mostly eats during the evening.    His problem is complicated by a hunger disorder and poor lifestyle choices    His ability to lose weight is impacted by current work life and lack of confidence.    PLAN:    Decrease portion sizes  Purge house of food triggers  Dietician visit of education  Volumetrics eating plan  Calorie/low fat diet  Meal planning    Strong genetic or hunger disorder  Ancillary testing:  N/A.  Food Plan:  Volumetrics and High protein/low carbohydrate.  Activity Plan:  Exercise after meals.  Supplementary:  N/A.  Medication:  The patient will begin medication in pursuit of improved medical status as influenced by body weight. He will start Wegovy 0.25 mg weekly x4 weeks;  increase to 0.5 mg weekly x4 weeks;  increase to 1.0 mg weekly for 4 weeks;  increase to 1.7 mg weekly for 4 weeks;  increase to 2.4 mg weekly thereafter    There is a mutual understanding of the goals and risks of this therapy. The patient is in agreement. He is educated on dosage regimen and possible side effects.    Orders Placed This  Encounter   Procedures    Med Therapy Management Referral       FOLLOW-UP:   See MTM PharmD in 2 month(s)  See MD or PA in 4 month(s).    Joined the call at 6/25/2024, 2:07:49 pm.  Left the call at 6/25/2024, 2:25:26 pm.  You were on the call for 17 minutes 37 seconds .    Sincerely,    Qiana Roblero MD

## 2024-06-25 NOTE — PATIENT INSTRUCTIONS
Start Wegovy 0.25 mg weekly x4 weeks;  increase to 0.5 mg weekly x4 weeks;  increase to 1.0 mg weekly for 4 weeks;  increase to 1.7 mg weekly for 4 weeks;  increase to 2.4 mg weekly thereafter    See MTM PharmD in 2 month(s)  See MD or PA in 4 month(s)    If you have any questions, please do not hesitate to call Weight management clinic at [phone] or [phone].  If you need to fax, please fax to [phone].    Sincerely,    Qiana Roblero MD  Endocrinology

## 2024-06-25 NOTE — LETTER
"2024       RE: Eliezer Ellis  [address]  [facility] [address]     Dear Colleague,    Thank you for referring your patient, Eliezer Ellis, to the [facility] WEIGHT MANAGEMENT [facility]INIC [facility]. Please see a copy of my visit note below.    Virtual Visit Details    Type of service:  Video Visit     Originating Location (pt. Location): Home    Distant Location (provider location):  Off-site  Platform used for Video Visit: [facility] Weight Management Consult    PATIENT:  Eliezer Ellis  MRN:         [id number]  :         1985  JOHNNY:         2024    Dear PCP,    I had the pleasure of seeing your patient, Eliezer Ellis. Full intake/assessment was done to determine barriers to weight loss success and develop a treatment plan. Eliezer Ellis is a 39 year old male interested in treatment of medical problems associated with excess weight. He has a height of 6' 0\", a weight of 270 lbs 0 oz, and the calculated Body mass index is 36.62 kg/m .    He has the following co-morbidities: hepatic steatosis, gout, dyslipidemia, JAVID, hypertension, migraine    Weight history: overweight for most of his life. Weight is up and down a lot. Lost weight during college and gained weight back as a adult. Lost weight again after cutting alcohol but slowly gained back after pandemic.  He found it very challenging to keep weight loss long term    Eating habit: mostly carbohydrate heavy and diary heavy in diet  Get hungry easily, snack often, stress and boredom eating   Proactively eat to prevent hunger and headache    Exercise: walking, start pickelball recently  Job: desk job     Was on topiramate from neurologist. No side effects but it did not help with migraine.        2024    10:24 PM   --   I have the following health issues associated with obesity High Blood Pressure    High Cholesterol    Sleep Apnea    Fatty Liver    Asthma    " "Hypothyroidism   I have the following symptoms associated with obesity Knee Pain    Depression    Back Pain    Fatigue         6/24/2024    10:24 PM   Referring Provider   Please name the provider who referred you to Medical Weight Management  If you do not know, please answer \"I Don't Know\" Dr. Huang           6/24/2024    10:24 PM   Weight History   How concerned are you about your weight? Very Concerned   I became overweight As a Child   The following factors have contributed to my weight gain Mental Health Issues    Started on Medication that Caused Weight Gain    Lack of Exercise    Genetic (Runs in the Family)    Stress   I have tried the following methods to lose weight Watching Portions or Calories    Exercise   My lowest weight since age 16 was 200   My highest weight since age 16 was 275   I have the following family history of obesity/being overweight My mother is overweight    My father is overweight    One or more of my siblings are overweight    Many of my relatives are overweight   How has your weight changed over the last year? Gained           6/24/2024    10:24 PM   Diet Recall Review with Patient   If you do eat breakfast, what types of food do you eat? Duque sunrise cereal with frozen blueberries and milk   If you do eat lunch, what types of food do you typically eat? Varies. Leftovers, a sandwich, a quesadilla, frozen foods, etc.   If you do eat supper, what types of food do you typically eat? Variety of foods   If you do snack, what types of food do you typically eat? Popcorn, peanut butter pretzels, oranges, grapes, cheese puffs, chocolate covered pretzels   How many glasses of juice do you drink in a typical day? 0   How many of glasses of milk do you drink in a typical day? 0   How many 8oz glasses of sugar containing drinks such as Sharath-Aid/sweet tea do you drink in a day? 0   How many cans/bottles of sugar pop/soda/tea/sports drinks do you drink in a day? 0   How many cans/bottles of diet " pop/soda/tea or sports drink do you drink in a day? 0   How often do you have a drink of alcohol? Never           6/24/2024    10:24 PM   Eating Habits   Generally, my meals include foods like these bread, pasta, rice, potatoes, corn, crackers, sweet dessert, pop, or juice Almost Everyday   Generally, my meals include foods like these fried meats, brats, burgers, french fries, pizza, cheese, chips, or ice cream A Few Times a Week   Eat fast food (like McDonalds, Burger Micah, Taco Bell) Less Than Weekly   Eat at a buffet or sit-down restaurant Less Than Weekly   Eat most of my meals in front of the TV or computer A Few Times a Week   Often skip meals, eat at random times, have no regular eating times Never   Rarely sit down for a meal but snack or graze throughout Never   Eat extra snacks between meals A Few Times a Week   Eat most of my food at the end of the day Less Than Weekly   Eat in the middle of the night or wake up at night to eat Less Than Weekly   Eat extra snacks to prevent or correct low blood sugar Less Than Weekly   Eat to prevent acid reflux or stomach pain Less Than Weekly   Worry about not having enough food to eat Never   I eat when I am depressed Never   I eat when I am stressed Once a Week   I eat when I am bored A Few Times a Week   I eat when I am anxious Less Than Weekly   I eat when I am happy or as a reward Once a Week   I feel hungry all the time even if I just have eaten Almost Everyday   Feeling full is important to me Almost Everyday   I finish all the food on my plate even if I am already full A Few Times a Week   I can't resist eating delicious food or walk past the good food/smell Once a Week   I eat/snack without noticing that I am eating Never   I eat when I am preparing the meal Once a Week   I eat more than usual when I see others eating Never   I have trouble not eating sweets, ice cream, cookies, or chips if they are around the house A Few Times a Week   I think about food all  day Once a Week   What foods, if any, do you crave? Chips/Crackers           6/24/2024    10:24 PM   Amount of Food   I feel out of control when eating Never   I eat a large amount of food, like a loaf of bread, a box of cookies, a pint/quart of ice cream, all at once Never   I eat a large amount of food even when I am not hungry Never   I eat rapidly Almost Everyday   I eat alone because I feel embarrassed and do not want others to see how much I have eaten Never   I eat until I am uncomfortably full Monthly   I feel bad, disgusted, or guilty after I overeat Monthly           6/24/2024    10:24 PM   Activity/Exercise History   How much of a typical 12 hour day do you spend sitting? Most of the Day   How much of a typical 12 hour day do you spend lying down? Less Than Half the Day   How much of a typical day do you spend walking/standing? Less Than Half the Day   How many hours (not including work) do you spend on the TV/Video Games/Computer/Tablet/Phone? 2-3 Hours   How many times a week are you active for the purpose of exercise? 2-3 Times a Week   What keeps you from being more active? Shortness of Breath    Lack of Time    Too tired    Unsure What To Do   How many total minutes do you spend doing some activity for the purpose of exercising when you exercise? 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 75%   How many hours do you spend commuting to work daily? 0    What is your marital status? /In a Relationship   If in a relationship, is your significant other overweight? Yes   Who do you live with? My partner   Who does the food shopping? Both of us           6/24/2024    10:24 PM   Mental Health History Reviewed With Patient   Have you ever been physically or sexually abused? No   How often in the past 2 weeks have you felt little interest or pleasure in doing things? Not at all   Over the past 2 weeks how often have you felt down, depressed, or hopeless? Not at all           6/24/2024    10:24 PM   Sleep History Reviewed With Patient   How many hours do you sleep at night? 8.5       MEDICATIONS:   Current Outpatient Medications   Medication Sig Dispense Refill     albuterol (PROAIR HFA/PROVENTIL HFA/VENTOLIN HFA) 108 (90 Base) MCG/ACT inhaler Inhale 2 puffs by mouth into the lungs every 6 hours as needed for shortness of breath, wheezing or cough. 8 g 11     allopurinol (ZYLOPRIM) 100 MG tablet Take 1 tablet (100 mg) by mouth daily 90 tablet 3     ALPRAZolam (XANAX) 1 MG tablet Take 1 tablet (1 mg) by mouth once as needed for anxiety 15 tablet 0     bisacodyl (DULCOLAX) 5 MG EC tablet Take 1 tablet (5 mg) by mouth daily as needed for constipation 30 tablet 0     Calcium Polycarbophil (FIBER) 625 MG tablet Take by mouth daily       cetirizine (ZYRTEC) 10 MG tablet Take 10 mg by mouth daily.       desvenlafaxine (PRISTIQ) 50 MG 24 hr tablet Take 1 tablet by mouth daily. 90 tablet 0     fluocinonide (LIDEX) 0.05 % external gel Apply topically to the affected area twice daily. 30 g 0     levothyroxine (SYNTHROID/LEVOTHROID) 75 MCG tablet Take 1 tablet by mouth daily. 90 tablet 3     omeprazole (PRILOSEC) 20 MG DR capsule Take 1 capsule (20 mg) by mouth daily 90 capsule 1     rimegepant (NURTEC) 75 MG ODT tablet Place 1 tablet (75 mg) under the tongue daily as needed for migraine Maximum of 1 tablet every 24 hours. 8 tablet 11     VITAMIN D PO Take 1,000 Units by mouth  daily Pt takes 2 tabs daily in the AM       levothyroxine (SYNTHROID/LEVOTHROID) 50 MCG tablet Take 1 tablet by mouth daily. 90 tablet 1     methylPREDNISolone (MEDROL DOSEPAK) 4 MG tablet therapy pack Follow Package Directions (Patient not taking: Reported on 6/24/2024) 21 tablet 0     order for DME Equipment being ordered: digital Blood pressure apparatus (Patient not taking: Reported on 6/24/2024) 1 Box 0       ALLERGIES:   Allergies   Allergen Reactions     Dust Mites Shortness Of Breath     Mold Shortness Of Breath     Cats Itching     Other Food Allergy Difficulty breathing, Headache and Visual Disturbance     Pcn [Penicillins]      Seasonal Allergies        PHYSICAL EXAM:  Ht 1.829 m (6')   Wt 122.5 kg (270 lb)   BMI 36.62 kg/m      Waist circumference:      Wt Readings from Last 4 Encounters:   06/25/24 122.5 kg (270 lb)   06/24/24 122.5 kg (270 lb)   01/16/24 121.3 kg (267 lb 8 oz)   07/13/23 122.5 kg (270 lb)     A & O x 3  HEENT: NCAT, mucous membranes moist  Respirations unlabored  Location of obesity: Mixed Obesity    Lab  Lab Results   Component Value Date    A1C 4.8 06/13/2022    A1C 4.6 09/27/2018       ASSESSMENT/PLAN:  Eliezer is a patient with mature onset obesity with significant element of familial/genetic influence and with current health consequences. He does not need aggressive weight loss plan.  Eliezer Ellis eats a high carb diet, uses food as mood management, has perception of intense hunger, and mostly eats during the evening.    His problem is complicated by a hunger disorder and poor lifestyle choices    His ability to lose weight is impacted by current work life and lack of confidence.    PLAN:    Decrease portion sizes  Purge house of food triggers  Dietician visit of education  Volumetrics eating plan  Calorie/low fat diet  Meal planning    Strong genetic or hunger disorder  Ancillary testing:  N/A.  Food Plan:  Volumetrics and High protein/low carbohydrate.  Activity Plan:  Exercise  after meals.  Supplementary:  N/A.  Medication:  The patient will begin medication in pursuit of improved medical status as influenced by body weight. He will start Wegovy 0.25 mg weekly x4 weeks;  increase to 0.5 mg weekly x4 weeks;  increase to 1.0 mg weekly for 4 weeks;  increase to 1.7 mg weekly for 4 weeks;  increase to 2.4 mg weekly thereafter    There is a mutual understanding of the goals and risks of this therapy. The patient is in agreement. He is educated on dosage regimen and possible side effects.    Orders Placed This Encounter   Procedures     Med Therapy Management Referral       FOLLOW-UP:   See MTM PharmD in 2 month(s)  See MD or PA in 4 month(s).    Joined the call at 6/25/2024, 2:07:49 pm.  Left the call at 6/25/2024, 2:25:26 pm.  You were on the call for 17 minutes 37 seconds .    Sincerely,    Qiana Roblero MD

## 2024-06-25 NOTE — NURSING NOTE
Is the patient currently in the state of [place]? YES    Visit mode:VIDEO    If the visit is dropped, the patient can be reconnected by: VIDEO VISIT: Text to cell phone:   Telephone Information:   Mobile [phone]       Will anyone else be joining the visit? NO  (If patient encounters technical issues they should call [phone]    How would you like to obtain your AVS? MyChart    Are changes needed to the allergy or medication list? No, Pt stated no changes to allergies, and Pt stated no med changes    Are refills needed on medications prescribed by this physician? NO    Reason for visit: Consult (ANTONI)    Verna ESPINAL

## 2024-06-26 ENCOUNTER — TELEPHONE (OUTPATIENT)
Dept: ENDOCRINOLOGY | Facility: CLINIC | Age: 39
End: 2024-06-26

## 2024-06-26 ENCOUNTER — VIRTUAL VISIT (OUTPATIENT)
Dept: ENDOCRINOLOGY | Facility: CLINIC | Age: 39
End: 2024-06-26
Payer: COMMERCIAL

## 2024-06-26 VITALS — HEIGHT: 72 IN | WEIGHT: 270 LBS | BODY MASS INDEX: 36.57 KG/M2

## 2024-06-26 DIAGNOSIS — E66.812 CLASS 2 SEVERE OBESITY DUE TO EXCESS CALORIES WITH SERIOUS COMORBIDITY AND BODY MASS INDEX (BMI) OF 36.0 TO 36.9 IN ADULT (H): ICD-10-CM

## 2024-06-26 DIAGNOSIS — E66.01 CLASS 2 SEVERE OBESITY DUE TO EXCESS CALORIES WITH SERIOUS COMORBIDITY AND BODY MASS INDEX (BMI) OF 36.0 TO 36.9 IN ADULT (H): ICD-10-CM

## 2024-06-26 DIAGNOSIS — Z71.3 NUTRITIONAL COUNSELING: Primary | ICD-10-CM

## 2024-06-26 PROCEDURE — 99207 PR NO CHARGE LOS: CPT | Mod: 95

## 2024-06-26 PROCEDURE — 97802 MEDICAL NUTRITION INDIV IN: CPT | Mod: 95

## 2024-06-26 ASSESSMENT — PAIN SCALES - GENERAL: PAINLEVEL: MILD PAIN (2)

## 2024-06-26 NOTE — TELEPHONE ENCOUNTER
PA Initiation    Medication: WEGOVY 0.25 MG/0.5ML SC SOAJ  Insurance Company: MEDICA - Phone [phone] Fax [phone]  Pharmacy Filling the Rx: [facility] - [place], [place] - [facility]  Filling Pharmacy Phone: [phone]  Filling Pharmacy Fax: [phone]  Start Date: 6/26/2024

## 2024-06-26 NOTE — PATIENT INSTRUCTIONS
"Roque Martins,     It was nice to meet you today.     Follow-up with RD as needed.     Thank you,    Rebekah Lambert, RD, LD  If you would like to schedule or reschedule an appointment with the RD, please call [phone]    Nutrition Goals  1) Aim for 60-90 grams of protein daily.   2) Increase fiber in diet by having more servings of fruits, vegetables and whole grains.   3) Aim for 64 oz of water daily.     Limit/avoid snacking as able. If snack is needed use lean protein and/or high-fiber foods. Examples:   - 2 cup popcorn   - 1 cup mixed berries   - 15 almonds, walnuts, cashews   - carrot/celery sticks and 2 tbsp low-fat ranch   - 1 hard boiled egg   - Part-skim mozzarella cheese stick   - Low-fat, low-sugar greek yogurt with 1/2 cup berries   - Med apple or pear   - sliced bell peppers with 1/2 cup salsa   - 1/2 cup roasted chickpeas   - sliced cucumbers with vinegar    100-Calorie Snack List: [email or web address]    Protein Sources   [email or web address]     Quick/Easy Protein Sources:  Hard boiled eggs  Part-skim cheese sticks  Baby Bell cheese rounds  Low-fat/low-sugar Greek yogurt  Low-fat cottage cheese  Lean deli meat (chicken/turkey/ham)  Roasted chickpeas or lentils  Nuts   Turkey meat stick  Protein shake/bar  \"P3\" snack (cheese, nuts, deli meat)  Aldi's \"Protein Bread\"   \"Egglife\" egg white wrap    Tuna/salmon can/packet     Non-meat protein Ideas  Quinoa  Eggs  Dairy (Cottage cheese, low fat cheese, greek yogurt)   Nuts  Beans  Lentils  Protein pasta   Nutritional yeast  Garden of life raw meal powder  Liquid aminos  Homemade meats - a taco meat could be made with chopped cauliflower/mushroom as an example   Hummus (could do homemade if preferred)  Hemp hearts   Tofu  Seitan     Complex Carbohydrates high in protein  Quinoa  Brown Rice  Chick Pea  Buckwheat  Sweet Potatoes  Lentils  Legumes  Guadarrama Beans  Black Beans   Farro   Kidney Beans     Fiber Content of " Foods  [email or web address]     Carbohydrates  [email or web address]     Mindful Eating  [email or web address]     Summary of Volumetrics Eating Plan  [email or web address]       COMPREHENSIVE WEIGHT MANAGEMENT PROGRAM  VIRTUAL SUPPORT GROUPS    At [facility], our Comprehensive Weight Management program offers on-line support groups for patients who are working on weight loss and considering, preparing for, or have had weight loss surgery.     There is no cost for this opportunity.  You are invited to attend the?Virtual Support Groups?provided by any of the following locations:    [facility] via Microsoft Teams with Jovanna Hendrix RN  2.   [facility] via DailyBurn with Michael Castillo, PhD, LP  3.   [facility] via DailyBurn with Brenda Waite RN  4.   [facility] via a Zoom Meeting with GUS Lei    The following Support Group information can also be found on our website:  [email or web address]      [facility] Weight Loss Surgery Support Group  The support group is a patient-lead forum that meets monthly to share experiences, encouragement and education. It is open to those who have had weight loss surgery, are scheduled for surgery, or are considering surgery.   WHEN: This group meets on the 3rd Wednesday of each month from 5:00PM - 6:00PM virtually using Microsoft Teams.   FACILITATOR: Led by Jovanna Rivas, RUBIN, LD, RN, the program's Clinical Coordinator.   TO REGISTER: Please contact the clinic via Clickpass or call the nurse line directly at [phone] to inform our staff that you would like an invite sent to you and to let us know the email you would like the invite sent to. Prior to the meeting, a link with directions on how to join the meeting will be sent to you.    2024 Meetings   January 17  February 21  March 20  April 17  May 15  Gladys 19        "[facility] Bariatric Care Support Group?  This is open to all pre- and post- operative bariatric surgery patients as well as their support system.   WHEN: This group meets the 3rd Tuesday of each month from 6:30 PM - 8:00 PM virtually using Microsoft Teams.   FACILITATOR: Led by Michael Castillo, Ph.D who is a Licensed Psychologist with the [facility] Comprehensive Weight Management Program.   TO REGISTER: Please send an email to Michael Castillo, Ph.D.,  [email or web address]?if you would like an invitation to the group. Prior to the meeting, a link with directions on how to join the meeting will be sent to you.    2024 Meetings January 16: \"Medication Management and Bariatric Surgery\", Windy Cerrato, PharmD, Pharmacy Resident at [facility]  February 20: \"A Bariatric Surgery Patient's Perspective\", PARTH Couch, [facility], Behavioral Health Clinician at [facility]  March 19  April 16  May 21  Gladys 18: \"Nutritional Labeling\", Dietitian speaker to be announced.  November 19: \"Holiday Eating\", Dietitian speaker to be announced.    [facility] Post-Operative Bariatric Surgery Support Group  This is a support group for [facility] bariatric patients (and those external to [facility]) who have had bariatric surgery and are at least 3 months post-surgery.  WHEN: This support group meets the 4th Wednesday of the month from 11:00 AM - 12:00 PM virtually using Microsoft Teams.   FACILITATOR: Led by Certified Bariatric Nurse, Brenda Waite RN.   TO REGISTER: Please send an email to Brenda at [email or web address] if you would like an invitation to the group.  Prior to the meeting, a link with directions on how to join the meeting will be sent to you.    2024 Meetings  January 24  February 28  March 27  April 24  May 22  Gladys 26    [facility]" "[facility] Healthy Lifestyle Group?  This is a 60 minute virtual coaching group for those who want to lead a healthier lifestyle. Come together to set goals and overcome barriers in a supportive group environment. We will address the four pillars of health: nutrition, exercise, sleep and emotional well-being.  This group is highly recommended for those who are participating in the 24 week Healthy Lifestyle Plan and our Health Coaching sessions.  WHEN: This group meets the 1st Friday of the month, 12:30 PM - 1:30 PM online, via a zoom meeting.    FACILITATOR: Led by National Board Certified Health and , Brenda Payne [facility], [facility].   TO REGISTER: Please call the Call Center at [phone] to register. You will get an appointment to attend in [employer]. Fifteen minutes prior to the meeting, complete the e-check in and you will get the link to join the meeting.    There is no charge to attend this group and space is limited.     2024 Meetings  January 5: \"New Years Vision: Manifest your Best 2024!\" (guided imagery,  journaling and discussion)  February 2: \"Let's Talk\"  March 1: \"10 Percent Happier\" by Silvio Mejia (Book Bites - a guided discussion on the nuggets of wisdom from favorite wellness books, no need to read the book but highly encouraged)  April 5: \"Let's Talk\"  May 3: \"Essentialism: The Disciplined Pursuit of Less\" by Kiko Samano (Book Bites discussion)  June 7: \"Let's Talk\"  July 5: NO MEETING, off for the 4th of July Holiday  August 2: \"The Blue Zones, Secrets for Living a Longer Life\" by Silvio Chan (Book Bites discussion)        "

## 2024-06-26 NOTE — TELEPHONE ENCOUNTER
PRIOR AUTHORIZATION DENIED    Medication: WEGOVY 0.25 MG/0.5ML SC SOAJ  Insurance Company: MEDICA - Phone [phone] Fax [phone]  Denial Date: 6/26/2024  Denial Reason(s):   Appeal Information: N/A  Patient Notified: clinic to discuss with pt what they would like to do

## 2024-06-26 NOTE — PROGRESS NOTES
Video-Visit Details    Type of service:  Video Visit    Video Start Time: 1:26 PM   Video End Time: 1:58 PM     Originating Location (pt. Location): Home    Distant Location (provider location):  Offsite (providers home) [facility] [place]     Platform used for Video Visit: Appetite+    New Weight Management Nutrition Consultation    Eliezer Ellis is a 39 year old male presents today for new weight management nutrition consultation.  Patient referred by Dr. Kiran on 2024.    Patient with Co-morbidities of obesity includin/24/2024    10:24 PM   --   I have the following health issues associated with obesity High Blood Pressure    High Cholesterol    Sleep Apnea    Fatty Liver    Asthma    Hypothyroidism   I have the following symptoms associated with obesity Knee Pain    Depression    Back Pain    Fatigue     Anthropometrics:  Initial consult weight: 270 lb on 24  Estimated body mass index is 36.62 kg/m  as calculated from the following:    Height as of 24: 1.829 m (6').    Weight as of 24: 122.5 kg (270 lb).    Medications for Weight Loss:  Hx of Topiramate for migraines     Occupation: Full-time Operations and     NUTRITION HISTORY  Food allergies: NKFA  Food intolerances: reacts to fermented foods, tries to avoid these foods if possible (high Amine foods)   Vitamin/Mineral Supplements: Vitamin D, Fiber supplement   Previous methods of diet modification for weight loss: watching portions/calories, Mediterranean diet, increased exercise   RD before: None     Hx of gout, but hasn't had a flare up recently.     Feels he has a good base knowledge of nutrition. Main goal is to lose weight, increase the variety of foods he eats.     Doesn't have a big interest in cooking. Feels like he is hungry often. Works from home. Tries to not eat out very often. Does get wings most .      Diet recall:   Breakfast - cereal (Duque sun  rise + crispix) with frozen blueberries, sometimes will add nuts. Kolona 2% milk. 1-2 cups of coffee   Lunch - very quick meals. [place] OR piece of toast with peanut butter and jelly, Quesadilla. Chicken tenders in air fryer, rice + chicken + broccoli.  Snack - 3 or 4 pm, also most common time he will get headaches. Popcorn, peanut butter pretzels or will sometimes go for fruit.   Dinner - sauteed vegetables, sometimes a frozen pizza   Snack - ice cream on occasion, popcorn, peanut butter pretzels   Hydration: Sparkling water, coffee     Feels he has 2-3 servings per day of fruits and vegetables   Proteins: Chicken, beef, pork, cheese, eggs         6/24/2024    10:24 PM   Diet Recall Review with Patient   If you do eat breakfast, what types of food do you eat? Duque sunrise cereal with frozen blueberries and milk   If you do eat lunch, what types of food do you typically eat? Varies. Leftovers, a sandwich, a quesadilla, frozen foods, etc.   If you do eat supper, what types of food do you typically eat? Variety of foods   If you do snack, what types of food do you typically eat? Popcorn, peanut butter pretzels, oranges, grapes, cheese puffs, chocolate covered pretzels   How many glasses of juice do you drink in a typical day? 0   How many of glasses of milk do you drink in a typical day? 0   How many 8oz glasses of sugar containing drinks such as Sharath-Aid/sweet tea do you drink in a day? 0   How many cans/bottles of sugar pop/soda/tea/sports drinks do you drink in a day? 0   How many cans/bottles of diet pop/soda/tea or sports drink do you drink in a day? 0   How often do you have a drink of alcohol? Never           6/24/2024    10:24 PM   Eating Habits   Generally, my meals include foods like these bread, pasta, rice, potatoes, corn, crackers, sweet dessert, pop, or juice Almost Everyday   Generally, my meals include foods like these fried meats, brats, burgers, french fries, pizza, cheese, chips, or ice cream A  Few Times a Week   Eat fast food (like McDonalds, Burger Micah, Taco Bell) Less Than Weekly   Eat at a buffet or sit-down restaurant Less Than Weekly   Eat most of my meals in front of the TV or computer A Few Times a Week   Often skip meals, eat at random times, have no regular eating times Never   Rarely sit down for a meal but snack or graze throughout Never   Eat extra snacks between meals A Few Times a Week   Eat most of my food at the end of the day Less Than Weekly   Eat in the middle of the night or wake up at night to eat Less Than Weekly   Eat extra snacks to prevent or correct low blood sugar Less Than Weekly   Eat to prevent acid reflux or stomach pain Less Than Weekly   Worry about not having enough food to eat Never   I eat when I am depressed Never   I eat when I am stressed Once a Week   I eat when I am bored A Few Times a Week   I eat when I am anxious Less Than Weekly   I eat when I am happy or as a reward Once a Week   I feel hungry all the time even if I just have eaten Almost Everyday   Feeling full is important to me Almost Everyday   I finish all the food on my plate even if I am already full A Few Times a Week   I can't resist eating delicious food or walk past the good food/smell Once a Week   I eat/snack without noticing that I am eating Never   I eat when I am preparing the meal Once a Week   I eat more than usual when I see others eating Never   I have trouble not eating sweets, ice cream, cookies, or chips if they are around the house A Few Times a Week   I think about food all day Once a Week   What foods, if any, do you crave? Chips/Crackers           6/24/2024    10:24 PM   Amount of Food   I feel out of control when eating Never   I eat a large amount of food, like a loaf of bread, a box of cookies, a pint/quart of ice cream, all at once Never   I eat a large amount of food even when I am not hungry Never   I eat rapidly Almost Everyday   I eat alone because I feel embarrassed and do  not want others to see how much I have eaten Never   I eat until I am uncomfortably full Monthly   I feel bad, disgusted, or guilty after I overeat Monthly     Physical Activity:  Walking, started Spotzot ball recently        6/24/2024    10:24 PM   Activity/Exercise History   How much of a typical 12 hour day do you spend sitting? Most of the Day   How much of a typical 12 hour day do you spend lying down? Less Than Half the Day   How much of a typical day do you spend walking/standing? Less Than Half the Day   How many hours (not including work) do you spend on the TV/Video Games/Computer/Tablet/Phone? 2-3 Hours   How many times a week are you active for the purpose of exercise? 2-3 Times a Week   What keeps you from being more active? Shortness of Breath    Lack of Time    Too tired    Unsure What To Do   How many total minutes do you spend doing some activity for the purpose of exercising when you exercise? 15-30 Minutes     Nutrition Prescription  Recommended energy/nutrient modification.    Nutrition Diagnosis  Obesity r/t long history of positive energy balance aeb BMI >30.    Nutrition Intervention  Materials/education provided on hypocaloric diet for weight loss. Discussed importance of protein and fiber. Encouraged patient to aim for 60-90 grams of protein daily. Increase fiber in diet by having more fruits, vegetables, and whole grains. Volumetric eating to help satiety level on fewer calories; portion control and healthy food choices (Plate Method and Volumetrics handouts), lower calorie snack choices, meal and snack planning tips and resources. Patient demonstrates understanding. Co-developed goals to work towards. Provided pt with list of goals and resources below via [employer].     Expected Engagement: good    Nutrition Goals  1) Aim for 60-90 grams of protein daily.   2) Increase fiber in diet by having more servings of fruits, vegetables and whole grains.   3) Aim for 64 oz of water daily.  "    Limit/avoid snacking as able. If snack is needed use lean protein and/or high-fiber foods. Examples:   - 2 cup popcorn   - 1 cup mixed berries   - 15 almonds, walnuts, cashews   - carrot/celery sticks and 2 tbsp low-fat ranch   - 1 hard boiled egg   - Part-skim mozzarella cheese stick   - Low-fat, low-sugar greek yogurt with 1/2 cup berries   - Med apple or pear   - sliced bell peppers with 1/2 cup salsa   - 1/2 cup roasted chickpeas   - sliced cucumbers with vinegar    100-Calorie Snack List: [email or web address]    Protein Sources   [email or web address]     Quick/Easy Protein Sources:  Hard boiled eggs  Part-skim cheese sticks  Baby Bell cheese rounds  Low-fat/low-sugar Greek yogurt  Low-fat cottage cheese  Lean deli meat (chicken/turkey/ham)  Roasted chickpeas or lentils  Nuts   Turkey meat stick  Protein shake/bar  \"P3\" snack (cheese, nuts, deli meat)  Aldi's \"Protein Bread\"   \"Egglife\" egg white wrap    Tuna/salmon can/packet     Non-meat protein Ideas  Quinoa  Eggs  Dairy (Cottage cheese, low fat cheese, greek yogurt)   Nuts  Beans  Lentils  Protein pasta   Nutritional yeast  Garden of life raw meal powder  Liquid aminos  Homemade meats - a taco meat could be made with chopped cauliflower/mushroom as an example   Hummus (could do homemade if preferred)  Hemp hearts   Tofu  Seitan     Complex Carbohydrates high in protein  Quinoa  Brown Rice  Chick Pea  Buckwheat  Sweet Potatoes  Lentils  Legumes  Guadarrama Beans  Black Beans   Farro   Kidney Beans     Fiber Content of Foods  [email or web address]     Carbohydrates  [email or web address]     Mindful Eating  [email or web address]     Summary of Volumetrics Eating Plan  [email or web address]     Follow-Up: as needed.     Time spent with patient: 32 minutes.  Rebekah Lambert RD, LD    "

## 2024-06-26 NOTE — LETTER
2024       RE: Eliezer Ellis  [address]  [place] [facility] [address]     Dear Colleague,    Thank you for referring your patient, Eliezer Ellis, to the [facility] [place] at [facility]. Please see a copy of my visit note below.    Video-Visit Details    Type of service:  Video Visit    Video Start Time: 1:26 PM   Video End Time: 1:58 PM     Originating Location (pt. Location): Home    Distant Location (provider location):  Offsite (providers home) [facility] [place]     Platform used for Video Visit: [email or web address]    New Weight Management Nutrition Consultation    Eliezer Ellis is a 39 year old male presents today for new weight management nutrition consultation.  Patient referred by Dr. Kiran on 2024.    Patient with Co-morbidities of obesity includin/24/2024    10:24 PM   --   I have the following health issues associated with obesity High Blood Pressure    High Cholesterol    Sleep Apnea    Fatty Liver    Asthma    Hypothyroidism   I have the following symptoms associated with obesity Knee Pain    Depression    Back Pain    Fatigue     Anthropometrics:  Initial consult weight: 270 lb on 24  Estimated body mass index is 36.62 kg/m  as calculated from the following:    Height as of 24: 1.829 m (6').    Weight as of 24: 122.5 kg (270 lb).    Medications for Weight Loss:  Hx of Topiramate for migraines     Occupation: Full-time Operations and     NUTRITION HISTORY  Food allergies: NKFA  Food intolerances: reacts to fermented foods, tries to avoid these foods if possible (high Amine foods)   Vitamin/Mineral Supplements: Vitamin D, Fiber supplement   Previous methods of diet modification for weight loss: watching portions/calories, Mediterranean diet, increased exercise   RD before: None     Hx of gout, but hasn't had a flare up recently.      Feels he has a good base knowledge of nutrition. Main goal is to lose weight, increase the variety of foods he eats.     Doesn't have a big interest in cooking. Feels like he is hungry often. Works from home. Tries to not eat out very often. Does get wings most Wednesdays.      Diet recall:   Breakfast - cereal (Duque sun rise + crispix) with frozen blueberries, sometimes will add nuts. Kolona 2% milk. 1-2 cups of coffee   Lunch - very quick meals. [place] OR piece of toast with peanut butter and jelly, Quesadilla. Chicken tenders in air fryer, rice + chicken + broccoli.  Snack - 3 or 4 pm, also most common time he will get headaches. Popcorn, peanut butter pretzels or will sometimes go for fruit.   Dinner - sauteed vegetables, sometimes a frozen pizza   Snack - ice cream on occasion, popcorn, peanut butter pretzels   Hydration: Sparkling water, coffee     Feels he has 2-3 servings per day of fruits and vegetables   Proteins: Chicken, beef, pork, cheese, eggs         6/24/2024    10:24 PM   Diet Recall Review with Patient   If you do eat breakfast, what types of food do you eat? Duque sunrise cereal with frozen blueberries and milk   If you do eat lunch, what types of food do you typically eat? Varies. Leftovers, a sandwich, a quesadilla, frozen foods, etc.   If you do eat supper, what types of food do you typically eat? Variety of foods   If you do snack, what types of food do you typically eat? Popcorn, peanut butter pretzels, oranges, grapes, cheese puffs, chocolate covered pretzels   How many glasses of juice do you drink in a typical day? 0   How many of glasses of milk do you drink in a typical day? 0   How many 8oz glasses of sugar containing drinks such as Sharath-Aid/sweet tea do you drink in a day? 0   How many cans/bottles of sugar pop/soda/tea/sports drinks do you drink in a day? 0   How many cans/bottles of diet pop/soda/tea or sports drink do you drink in a day? 0   How often do you have a drink of  alcohol? Never           6/24/2024    10:24 PM   Eating Habits   Generally, my meals include foods like these bread, pasta, rice, potatoes, corn, crackers, sweet dessert, pop, or juice Almost Everyday   Generally, my meals include foods like these fried meats, brats, burgers, french fries, pizza, cheese, chips, or ice cream A Few Times a Week   Eat fast food (like McDonalds, Burger Micah, Taco Bell) Less Than Weekly   Eat at a buffet or sit-down restaurant Less Than Weekly   Eat most of my meals in front of the TV or computer A Few Times a Week   Often skip meals, eat at random times, have no regular eating times Never   Rarely sit down for a meal but snack or graze throughout Never   Eat extra snacks between meals A Few Times a Week   Eat most of my food at the end of the day Less Than Weekly   Eat in the middle of the night or wake up at night to eat Less Than Weekly   Eat extra snacks to prevent or correct low blood sugar Less Than Weekly   Eat to prevent acid reflux or stomach pain Less Than Weekly   Worry about not having enough food to eat Never   I eat when I am depressed Never   I eat when I am stressed Once a Week   I eat when I am bored A Few Times a Week   I eat when I am anxious Less Than Weekly   I eat when I am happy or as a reward Once a Week   I feel hungry all the time even if I just have eaten Almost Everyday   Feeling full is important to me Almost Everyday   I finish all the food on my plate even if I am already full A Few Times a Week   I can't resist eating delicious food or walk past the good food/smell Once a Week   I eat/snack without noticing that I am eating Never   I eat when I am preparing the meal Once a Week   I eat more than usual when I see others eating Never   I have trouble not eating sweets, ice cream, cookies, or chips if they are around the house A Few Times a Week   I think about food all day Once a Week   What foods, if any, do you crave? Chips/Crackers           6/24/2024     10:24 PM   Amount of Food   I feel out of control when eating Never   I eat a large amount of food, like a loaf of bread, a box of cookies, a pint/quart of ice cream, all at once Never   I eat a large amount of food even when I am not hungry Never   I eat rapidly Almost Everyday   I eat alone because I feel embarrassed and do not want others to see how much I have eaten Never   I eat until I am uncomfortably full Monthly   I feel bad, disgusted, or guilty after I overeat Monthly     Physical Activity:  Walking, started Coupang ball recently        6/24/2024    10:24 PM   Activity/Exercise History   How much of a typical 12 hour day do you spend sitting? Most of the Day   How much of a typical 12 hour day do you spend lying down? Less Than Half the Day   How much of a typical day do you spend walking/standing? Less Than Half the Day   How many hours (not including work) do you spend on the TV/Video Games/Computer/Tablet/Phone? 2-3 Hours   How many times a week are you active for the purpose of exercise? 2-3 Times a Week   What keeps you from being more active? Shortness of Breath    Lack of Time    Too tired    Unsure What To Do   How many total minutes do you spend doing some activity for the purpose of exercising when you exercise? 15-30 Minutes     Nutrition Prescription  Recommended energy/nutrient modification.    Nutrition Diagnosis  Obesity r/t long history of positive energy balance aeb BMI >30.    Nutrition Intervention  Materials/education provided on hypocaloric diet for weight loss. Discussed importance of protein and fiber. Encouraged patient to aim for 60-90 grams of protein daily. Increase fiber in diet by having more fruits, vegetables, and whole grains. Volumetric eating to help satiety level on fewer calories; portion control and healthy food choices (Plate Method and Volumetrics handouts), lower calorie snack choices, meal and snack planning tips and resources. Patient demonstrates understanding.  "Co-developed goals to work towards. Provided pt with list of goals and resources below via [employer].     Expected Engagement: good    Nutrition Goals  1) Aim for 60-90 grams of protein daily.   2) Increase fiber in diet by having more servings of fruits, vegetables and whole grains.   3) Aim for 64 oz of water daily.     Limit/avoid snacking as able. If snack is needed use lean protein and/or high-fiber foods. Examples:   - 2 cup popcorn   - 1 cup mixed berries   - 15 almonds, walnuts, cashews   - carrot/celery sticks and 2 tbsp low-fat ranch   - 1 hard boiled egg   - Part-skim mozzarella cheese stick   - Low-fat, low-sugar greek yogurt with 1/2 cup berries   - Med apple or pear   - sliced bell peppers with 1/2 cup salsa   - 1/2 cup roasted chickpeas   - sliced cucumbers with vinegar    100-Calorie Snack List: [email or web address]    Protein Sources   [email or web address]     Quick/Easy Protein Sources:  Hard boiled eggs  Part-skim cheese sticks  Baby Bell cheese rounds  Low-fat/low-sugar Greek yogurt  Low-fat cottage cheese  Lean deli meat (chicken/turkey/ham)  Roasted chickpeas or lentils  Nuts   Turkey meat stick  Protein shake/bar  \"P3\" snack (cheese, nuts, deli meat)  Aldi's \"Protein Bread\"   \"Egglife\" egg white wrap    Tuna/salmon can/packet     Non-meat protein Ideas  Quinoa  Eggs  Dairy (Cottage cheese, low fat cheese, greek yogurt)   Nuts  Beans  Lentils  Protein pasta   Nutritional yeast  Garden of life raw meal powder  Liquid aminos  Homemade meats - a taco meat could be made with chopped cauliflower/mushroom as an example   Hummus (could do homemade if preferred)  Hemp hearts   Tofu  Seitan     Complex Carbohydrates high in protein  Quinoa  Brown Rice  Chick Pea  Buckwheat  Sweet Potatoes  Lentils  Legumes  Guadarrama Beans  Black Beans   Farro   Kidney Beans     Fiber Content of Foods  [email or web address]     Carbohydrates  [email or web address]     Mindful " Eating  [email or web address]     Summary of Volumetrics Eating Plan  [email or web address]     Follow-Up: as needed.     Time spent with patient: 32 minutes.  Rebekah Lambert RD, LD

## 2024-06-27 ENCOUNTER — TELEPHONE (OUTPATIENT)
Dept: ENDOCRINOLOGY | Facility: CLINIC | Age: 39
End: 2024-06-27
Payer: COMMERCIAL

## 2024-06-27 NOTE — TELEPHONE ENCOUNTER
MTM referral from: [facility] visit (referral by provider)    MTM referral outreach attempt #2 on June 27, 2024 at 10:04 AM      Outcome: Patient not reachable after several attempts, routed to Pharmacist Team/Provider as an FYI    Use hbc for the carrier/Plan on the flowsheet      [employer] Message Sent    Daljit Castro  MTM

## 2024-06-27 NOTE — TELEPHONE ENCOUNTER
Prior Authorization Approval    Medication: NURTEC 75 MG PO TBDP  Authorization Effective Date: 5/28/2024  Authorization Expiration Date: 6/27/2025  Approved Dose/Quantity:   Reference #:     Insurance Company: [employer] Non-Specialty PA's - Phone [phone] Fax [phone]  Expected CoPay: $    CoPay Card Available:      Financial Assistance Needed:   Which Pharmacy is filling the prescription: [employer] [employer] - [facility] - [place], [place] - [address]  Pharmacy Notified: YES  Patient Notified: **Instructed pharmacy to notify patient when script is ready to /ship.**

## 2024-06-27 NOTE — TELEPHONE ENCOUNTER
PA Initiation    Medication: NURTEC 75 MG PO TBDP  Insurance Company: [employer] Non-Specialty PA's - Phone [phone] Fax [phone]  Pharmacy Filling the Rx: [email or web address] - [place], [place] - [address]  Filling Pharmacy Phone: [phone]  Filling Pharmacy Fax: [phone]  Start Date: 6/27/2024

## 2024-06-28 NOTE — TELEPHONE ENCOUNTER
MTM Referral outreach attempt #3 was made on 06/28/2024.       Outcome: Sent patient MyChart message explaining more in-depth what MTM is and how we can best support them. Attached ability to schedule from message, as well as offered opportunity to call me directly for help with scheduling.

## 2024-06-28 NOTE — TELEPHONE ENCOUNTER
Follow up. PA Denied for [employer]. Plan exclusion. Should Liaison shred order or send to pharmacy for cash pay option?

## 2024-07-10 ASSESSMENT — HEADACHE IMPACT TEST (HIT 6)
HOW OFTEN HAVE YOU FELT FED UP OR IRRITATED BECAUSE OF YOUR HEADACHES: SOMETIMES
HOW OFTEN HAVE YOU FELT TOO TIRED TO WORK BECAUSE OF YOUR HEADACHES: RARELY
HOW OFTEN DO HEADACHES LIMIT YOUR DAILY ACTIVITIES: SOMETIMES
WHEN YOU HAVE A HEADACHE HOW OFTEN DO YOU WISH YOU COULD LIE DOWN: VERY OFTEN
HIT6 TOTAL SCORE: 58
WHEN YOU HAVE HEADACHES HOW OFTEN IS THE PAIN SEVERE: RARELY
HOW OFTEN DID HEADACHS LIMIT CONCENTRATION ON WORK OR DAILY ACTIVITY: VERY OFTEN

## 2024-07-15 ENCOUNTER — TELEPHONE (OUTPATIENT)
Dept: ENDOCRINOLOGY | Facility: CLINIC | Age: 39
End: 2024-07-15

## 2024-07-15 ENCOUNTER — OFFICE VISIT (OUTPATIENT)
Dept: NEUROLOGY | Facility: CLINIC | Age: 39
End: 2024-07-15
Payer: COMMERCIAL

## 2024-07-15 DIAGNOSIS — G43.709 CHRONIC MIGRAINE WITHOUT AURA WITHOUT STATUS MIGRAINOSUS, NOT INTRACTABLE: Primary | ICD-10-CM

## 2024-07-15 PROCEDURE — 64615 CHEMODENERV MUSC MIGRAINE: CPT | Performed by: PSYCHIATRY & NEUROLOGY

## 2024-07-15 NOTE — PROGRESS NOTES
[facility]  Botulinum Toxin Procedure    Sol Linares MD  Headache Neurology    July 15, 2024    Procedure:  OnabotulinumtoxinA injections for chronic migraine  Indication:  Chronic migraine    Mr. Ellis suffers from severe intractable headaches.  He was referred by Dr. Linares for onabotulinumtoxinA injections for headache.  Risks, benefits, and alternatives were discussed.  All questions were answered and consent given.  He decided to proceed with the injections.      Prior to initiation of botulinum toxin injections, Mr. Ellis reported 20 headache days per month, with 20 severe headache days per month. His headaches are quite disabling and often interfere with his ability to function normally.     Last set of injections: April 22, 2024     He reports 6-8 headache days per month, with 0 severe headache days per month. Headaches are nearly daily since Botox wore off about 2-3 weeks ago.   **We will plan to get 200 units for his next set of injections.     He takes Tylenol and Nurtec as needed.     He has attempted other migraine prophylactic treatments in the past, which have included: Emgality, desvenlafaxine, topiramate, amitriptyline, buspirone, venlafaxine, propranolol.       He currently takes desvenlafaxine for headache prevention.    Procedural Pause: Procedural pause was conducted to verify correct patient identity, procedure to be performed, correct side and site, correct patient position, and special requirements. Appropriate hand hygiene was utilized, and each injection site was prepped with alcohol wipe or Chloraprep swab.     Procedure Details: 200 units of onabotulinumtoxinA was diluted in 4 mL 0.9% normal saline. A total of 200 units of onabotulinumtoxinA were injected using 30 gauge 0.5 in needles into the muscles listed below. 0 units of onabotulinumtoxinA were wasted.     Injection Sites: Total = 200 units onabotulinumtoxinA      and Procerus muscles - 5 units into the left and  right corrugators and 5 units into the procerus (15 units total)    Frontalis muscles - 5 units into the left superior frontalis and 5 units into the right superior frontalis (2 injection sites per muscle) (10 units total)    Temporalis muscles - 25 units into the left temporalis muscle and 25 units into the right temporalis muscle (3 injection sites per muscle) (50 units total)    Occipitalis muscles - 25 units into the left occipitalis muscle and 25 units into the right occipitalis muscle (3 injection sites per muscle) (50 units total)    Splenius Capitis muscles - 12.5 units into the left splenius capitis muscle and 12.5 units into the right splenius capitis muscle (2 injection sites per muscle, divided into 2/3 anteriorly and 1/3 posteriorly) (25 units total)      Trapezius muscles - 25 units into the left trapezius muscle and 25 units into the right trapezius muscle (3 injection sites per muscle, divided 5 units, 10 units, 10 units, medial to lateral) (50 units total)    Mr. Ellis tolerated the procedure well without immediate complications.  He will follow up in clinic for assessment of the effectiveness of treatment.  He did not report any change in his pain level after the botulinumtoxinA injection procedure.    Sol Linares MD  Headache Neurology  [facility]

## 2024-07-15 NOTE — LETTER
7/15/2024       RE: Eliezer Ellis  [address]  [place] [address]     Dear Colleague,    Thank you for referring your patient, Eliezer Ellis, to the [facility] [place] at [facility]. Please see a copy of my visit note below.    [facility]  Botulinum Toxin Procedure    Sol Linares MD  Headache Neurology    July 15, 2024    Procedure:  OnabotulinumtoxinA injections for chronic migraine  Indication:  Chronic migraine    Mr. Ellis suffers from severe intractable headaches.  He was referred by Dr. Linares for onabotulinumtoxinA injections for headache.  Risks, benefits, and alternatives were discussed.  All questions were answered and consent given.  He decided to proceed with the injections.      Prior to initiation of botulinum toxin injections, Mr. Ellis reported 20 headache days per month, with 20 severe headache days per month. His headaches are quite disabling and often interfere with his ability to function normally.     Last set of injections: April 22, 2024     He reports 6-8 headache days per month, with 0 severe headache days per month. Headaches are nearly daily since Botox wore off about 2-3 weeks ago.   **We will plan to get 200 units for his next set of injections.     He takes Tylenol and Nurtec as needed.     He has attempted other migraine prophylactic treatments in the past, which have included: Emgality, desvenlafaxine, topiramate, amitriptyline, buspirone, venlafaxine, propranolol.       He currently takes desvenlafaxine for headache prevention.    Procedural Pause: Procedural pause was conducted to verify correct patient identity, procedure to be performed, correct side and site, correct patient position, and special requirements. Appropriate hand hygiene was utilized, and each injection site was prepped with alcohol wipe or Chloraprep swab.     Procedure Details: 200 units of onabotulinumtoxinA was  diluted in 4 mL 0.9% normal saline. A total of 200 units of onabotulinumtoxinA were injected using 30 gauge 0.5 in needles into the muscles listed below. 0 units of onabotulinumtoxinA were wasted.     Injection Sites: Total = 200 units onabotulinumtoxinA      and Procerus muscles - 5 units into the left and right corrugators and 5 units into the procerus (15 units total)    Frontalis muscles - 5 units into the left superior frontalis and 5 units into the right superior frontalis (2 injection sites per muscle) (10 units total)    Temporalis muscles - 25 units into the left temporalis muscle and 25 units into the right temporalis muscle (3 injection sites per muscle) (50 units total)    Occipitalis muscles - 25 units into the left occipitalis muscle and 25 units into the right occipitalis muscle (3 injection sites per muscle) (50 units total)    Splenius Capitis muscles - 12.5 units into the left splenius capitis muscle and 12.5 units into the right splenius capitis muscle (2 injection sites per muscle, divided into 2/3 anteriorly and 1/3 posteriorly) (25 units total)      Trapezius muscles - 25 units into the left trapezius muscle and 25 units into the right trapezius muscle (3 injection sites per muscle, divided 5 units, 10 units, 10 units, medial to lateral) (50 units total)    Mr. Ellis tolerated the procedure well without immediate complications.  He will follow up in clinic for assessment of the effectiveness of treatment.  He did not report any change in his pain level after the botulinumtoxinA injection procedure.    Sol Linares MD  Headache Neurology  [facility]

## 2024-07-15 NOTE — TELEPHONE ENCOUNTER
Left Voicemail (1st Attempt) and Sent Mychart (1st Attempt) for the patient to call back and schedule the following:    Appointment type: RET MWM   Provider: Dr. Kiran or PA  Return date: 4 mos   Specialty phone number: [phone]  Additional appointment(s) needed: yes  Additonal Notes: NEW MTM, Pharmacist, 2 mos

## 2024-08-30 ENCOUNTER — VIRTUAL VISIT (OUTPATIENT)
Dept: CARDIOLOGY | Facility: CLINIC | Age: 39
End: 2024-08-30
Attending: INTERNAL MEDICINE
Payer: COMMERCIAL

## 2024-08-30 VITALS — BODY MASS INDEX: 36.62 KG/M2 | HEIGHT: 72 IN

## 2024-08-30 DIAGNOSIS — E66.01 MORBID OBESITY (H): Primary | ICD-10-CM

## 2024-08-30 ASSESSMENT — PAIN SCALES - GENERAL: PAINLEVEL: NO PAIN (0)

## 2024-08-30 NOTE — LETTER
8/30/2024      RE: Eliezer Ellis  [address]  [place] [address]       Dear Colleague,    Thank you for the opportunity to participate in the care of your patient, Eliezer Ellis, at the [facility] [place] at [facility]. Please see a copy of my visit note below.    Medication Therapy Management (MTM) Encounter    ASSESSMENT:                            Medication Adherence/Access: See below for considerations    Weight management:   Patient would benefit from additional pharmacotherapy for weight management. Given class III obesity, recommend GLP1/GIP therapy as data to support most significant weight loss. Patient also likely to benefit from reduction in food noise and increased satiety. Discussed coverage and insurance barriers - interested in exploring patient assistance program if available. Pursuing compoundeded semaglutide will be more immediate availability and establish baseline on medication. Considerations for insurance changes during open enrollement could be useful.       PLAN:                            Start compounded semaglutide 0.25 mg subcutaneous injection once weekly. New prescription sent to compounding pharmacy.   Reviewed mechanism, adverse effects, monitoring, safety, administration with use of compounded semalgutide.   Increase to 0.5 mg once weekly after 4 weeks.   Pharmacist to look into availability of patient assistance program for Wegovy.     Follow-up: 4-6 weeks after starting compounded semaglutide, link sent via [employer] to schedule    SUBJECTIVE/OBJECTIVE:                          Eliezer Ellis is a 39 year old male seen for an initial visit. He was referred to me from Qiana Roblero. Patient was accompanied by his partner, Oralia.     Reason for visit: access to GLP1 medication.    Allergies/ADRs: Reviewed in chart  Past Medical History: Reviewed in chart  Tobacco: He reports that he has never smoked. He  has never used smokeless tobacco.  Alcohol: not currently using    Medication Adherence/Access: GLP1 excluded from insurance plan    Weight Management  Has not been on any weight loss medication, open to compounded semaglutide, curious about steps needed if not able to get anymore. Interested in other avenues to help with medication cost long term.   Options for insurance change during open enrollment may be limited.     More consistency/schedule with eating vs waiting until overly hungry  Focusing on protein/fiber     Nutrition/Eating Habits: working on more consistency/schedule with eating vs waiting until overly hungry. Working with registered dietitian, increasing protein and fiber foods.       Medications Tried/Failed/Considered:  Phentermine: hx anxiety  Topiramate: side effects of mouth tingling,   Bupropion: hx anxiety       Initial Consult Weight: 270 lbs (6/25/2024)       Wt Readings from Last 4 Encounters:   06/26/24 122.5 kg (270 lb)   06/25/24 122.5 kg (270 lb)   06/24/24 122.5 kg (270 lb)   01/16/24 121.3 kg (267 lb 8 oz)     Estimated body mass index is 36.62 kg/m  as calculated from the following:    Height as of this encounter: 1.829 m (6').    Weight as of 6/26/24: 122.5 kg (270 lb).      Today's Vitals: Ht 1.829 m (6')   BMI 36.62 kg/m    ----------------      I spent 60 minutes with this patient today. All changes were made via collaborative practice agreement with Qiana Roblero. A copy of the visit note was provided to the patient's provider(s).    A summary of these recommendations was sent via [employer].    Ashlie Stewart, PharmD, BCACP  Medication Therapy Management (MTM) Pharmacist   [facility]    Telemedicine Visit Details  Type of service:  Video Conference via [employer]  Start Time:  1:00 PM  End Time: 2:00 PM     Medication Therapy Recommendations  Morbid obesity (H)    Current Medication: Semaglutide-Weight Management (WEGOVY) 0.25  MG/0.5ML pen (Discontinued)   Rationale: Cannot afford medication product - Cost - Adherence   Recommendation: Change Medication Formulation  - COMPOUNDED NON-CONTROLLED SUBSTANCE - PHARMACY TO MIX COMPOUNDED MEDICATION   Status: Accepted per CPA                Please do not hesitate to contact me if you have any questions/concerns.     Sincerely,     Ashlie Stewart, [facility]

## 2024-08-30 NOTE — NURSING NOTE
Current patient location: home    Is the patient currently in the state of [place]? YES    Visit mode:VIDEO    If the visit is dropped, the patient can be reconnected by: VIDEO VISIT: Text to cell phone:   Telephone Information:   Mobile [phone]       Will anyone else be joining the visit? NO  (If patient encounters technical issues they should call [phone] :[id number])    How would you like to obtain your AVS? MyChart    Are changes needed to the allergy or medication list? Pt not taking unselected meds.     Are refills needed on medications prescribed by this physician? NO    Rooming Documentation:  Not applicable    Reason for visit: Medication Therapy Management    Wt other than 24 hrs:  no wt given   Pain more than one location:  no  Shu ESPINAL

## 2024-08-30 NOTE — PROGRESS NOTES
Medication Therapy Management (MTM) Encounter    ASSESSMENT:                            Medication Adherence/Access: See below for considerations    Weight management:   Patient would benefit from additional pharmacotherapy for weight management. Given class III obesity, recommend GLP1/GIP therapy as data to support most significant weight loss. Patient also likely to benefit from reduction in food noise and increased satiety. Discussed coverage and insurance barriers - interested in exploring patient assistance program if available. Pursuing compoundeded semaglutide will be more immediate availability and establish baseline on medication. Considerations for insurance changes during open enrollement could be useful.       PLAN:                            Start compounded semaglutide 0.25 mg subcutaneous injection once weekly. New prescription sent to compounding pharmacy.   Reviewed mechanism, adverse effects, monitoring, safety, administration with use of compounded semalgutide.   Increase to 0.5 mg once weekly after 4 weeks.   Pharmacist to look into availability of patient assistance program for Wegovy.     Follow-up: 4-6 weeks after starting compounded semaglutide, link sent via [employer] to schedule    SUBJECTIVE/OBJECTIVE:                          Eliezer Ellis is a 39 year old male seen for an initial visit. He was referred to me from Qiana Roblero. Patient was accompanied by his partner, Oralia.     Reason for visit: access to GLP1 medication.    Allergies/ADRs: Reviewed in chart  Past Medical History: Reviewed in chart  Tobacco: He reports that he has never smoked. He has never used smokeless tobacco.  Alcohol: not currently using    Medication Adherence/Access: GLP1 excluded from insurance plan    Weight Management   Has not been on any weight loss medication, open to compounded semaglutide, curious about steps needed if not able to get anymore. Interested in other avenues to help with medication cost  long term.   Options for insurance change during open enrollment may be limited.     More consistency/schedule with eating vs waiting until overly hungry  Focusing on protein/fiber     Nutrition/Eating Habits: working on more consistency/schedule with eating vs waiting until overly hungry. Working with registered dietitian, increasing protein and fiber foods.       Medications Tried/Failed/Considered:  Phentermine: hx anxiety  Topiramate: side effects of mouth tingling,   Bupropion: hx anxiety       Initial Consult Weight: 270 lbs (6/25/2024)       Wt Readings from Last 4 Encounters:   06/26/24 122.5 kg (270 lb)   06/25/24 122.5 kg (270 lb)   06/24/24 122.5 kg (270 lb)   01/16/24 121.3 kg (267 lb 8 oz)     Estimated body mass index is 36.62 kg/m  as calculated from the following:    Height as of this encounter: 1.829 m (6').    Weight as of 6/26/24: 122.5 kg (270 lb).      Today's Vitals: Ht 1.829 m (6')   BMI 36.62 kg/m    ----------------      I spent 60 minutes with this patient today. All changes were made via collaborative practice agreement with Qiana Roblero. A copy of the visit note was provided to the patient's provider(s).    A summary of these recommendations was sent via [employer]    Ashlie Stewart, PharmD, BCACP  Medication Therapy Management (MTM) Pharmacist   [facility]    Telemedicine Visit Details  Type of service:  Video Conference via [employer]  Start Time:  1:00 PM  End Time: 2:00 PM     Medication Therapy Recommendations  Morbid obesity (H)    Current Medication: Semaglutide-Weight Management (WEGOVY) 0.25 MG/0.5ML pen (Discontinued)   Rationale: Cannot afford medication product - Cost - Adherence   Recommendation: Change Medication Formulation  - COMPOUNDED NON-CONTROLLED SUBSTANCE - PHARMACY TO MIX COMPOUNDED MEDICATION   Status: Accepted per CPA

## 2024-08-30 NOTE — PATIENT INSTRUCTIONS
"Recommendations from today's MTM visit:                                                    Start compounded semaglutide 0.25 mg subcutaneous injection once weekly. New prescription sent to compounding pharmacy.   Reviewed mechanism, adverse effects, monitoring, safety, administration with use of compounded semalgutide.   Increase to 0.5 mg once weekly after 4 weeks.   Pharmacist to look into availability of patient assistance program for Wegovy.     Follow-up: 4-6 weeks after starting compounded semaglutide, link sent via [employer] to schedule    It was great speaking with you today.  I value your experience and would be very thankful for your time in providing feedback in our clinic survey. In the next few days, you may receive an email or text message from [email or web address] with a link to a survey related to your  clinical pharmacist.\"     To schedule another MTM appointment, please call the clinic directly or you may call the MTM scheduling line at [phone] or toll-free at [phone].     My Clinical Pharmacist's contact information:                                                      Please feel free to contact me with any questions or concerns you have.      Ashlie Stewart, PharmD, [facility]  Medication Therapy Management (MTM) Pharmacist   [facility]    ---  Compound Semaglutide at [facility]  [facility] is now offering compounded semaglutide during the time of Wegovy national shortage/limited supply. Semaglutide is the generic name of Wegovy. [facility] is following the highest standards for sterility and compounding practices. Not all compounding practices are equal. Therefore, [facility] will not be prescribing compounded semaglutide outside of the [facility]. Compounding of semaglutide is legal for as long as Wegovy is on the FDA's national shortage list. When/if Wegovy is taken " "off the FDA's shortage list, compounded semaglutide will no longer be legal to manufacture. When this occurs, patients will have to turn to acquiring Wegovy via its available manufactured pen, look into alternative weight loss medication(s), or stop the medication. Compound semaglutide will be available as a pre-filled syringe. Due to high demand of compounded semaglutide, orders may take 1-2 weeks to obtain from time of prescribing. Each dose of the medication will require a separate prescription.     As with any weight loss medication(s), there is a risk of weight regain should you stop semaglutide. It is important to be aware of this risk should you stop compounded semaglutide with no plans to transition back to an alternative injectable option as the use of semaglutide is intended for long term weight management with the intention of remaining on this injectable long term.        Obtaining Medication and Storage:   The pharmacy must speak to the patient directly prior to shipping medication to walk through administration, shipping and cost. Vials of compounded semaglutide and unit syringes will be mailed from the compounding pharmacy to your home in a refrigerated box. The vial you will be given is a multi-use vial, meaning that you will use the same vial during the next 28 days for each of your injections. Use a new syringe for each injection. The syringe that will be used to draw up your dose is a \"unit\" syringe, meaning your dose will have an associated \"mg\" and \"units\". Please see your prescription and the below information to verify the dose you should be injecting in UNITS prior to doing your injection. Keep your medication stored in the refrigerator. The vials are to be discarded 28 days after opening (even if there is remaining medication in the vial).     Common Side Effects:  Side effect profile is the same as Wegovy. Monitor for nausea, diarrhea, constipation, headache, indigestion, tiredness " (fatigue), stomach upset or abdominal pain. Less commonly, semaglutide can cause low blood sugar (symptoms: shaky, dizzy, sweaty, agitation). Please reach out to the care team should you feel like this is occurring. It is important to ensure that you are eating consistent meals and not skipping meals. Ensure you are getting at least 64 oz water daily. If any side effects become unmanageable, contact the care team immediately.    Dosing:  Each week you will have the opportunity/option to increase your dose. However, therapy is individualized for each patient. The below is a general guide should someone increase dosage of compound semaglutide each month.   Week 1-4: Inject 0.25 mg (meaning 5 units) subcutaneously once weekly  Week 5-8: If tolerating, increase to 0.5 mg (meaning 10 units) once weekly  Week 9-12: If tolerating, increase to 1 mg (meaning 20 units) once weekly  Week 13-15: If tolerating, increase to 1.5 mg (meaning 30 units) OR 1.7 mg (meaning 34 units) once weekly (dosing depending on what was prescribed by provider)  Week 16-19: If tolerating, increase to 2 mg (meaning 40 units) once weekly  Week 20 & on: If tolerating, increase to 2.4 mg (meaning 48 units) or 2.5 mg (meaning 50 units) once weekly (dosing depending on what was prescribed by provider)  *If you are having some nausea or other side effects to where you are hesitant to move up to the next dose, stay at the same dose you are on for an additional 4 weeks to see if side effect(s) improves/resolves. Make sure to take this time to hydrate and utilizing smaller more consistent meals, such as 4-6 small meals per day.  It may be advantageous to stay at the same dose if you are seeing good efficacy (both on and off the scale) and having minimal/manageable side effects. If you do not have additional refills on that dose's prescription, please reach out to the clinic.    Mg to Unit Conversion Chart for Reference:         Administration:  Follow the  instructions to fill the syringe with medicine. Instructions can be accessed at [email or web address] or by scanning this QR code-    Follow the instructions to inject your medication. Instructions can be accessed at [email or web address]  or by scanning this QR code-      Syringe Disposal:   Place the used syringe in a sharps container. You can buy a sharps container at your local pharmacy.   If you don't have a sharps container, you can use a plastic detergent container with a lid.   Visit Learning About Safe Needle Use and Disposal ([employer]net) and safeneedledisposal.org for more information.     Cost:   $230 per month for doses 1 mg or less (one 1 mL vial), $370 per month for doses higher than 1 mg (two 1 mL vials)     [facility] Phone:  [phone]

## 2024-08-30 NOTE — Clinical Note
Roque Kiran,   Thanks for the referral! Eliezer is going to get started on the compounded semaglutide for now and I'm going to look into possible options to help with cost long term. I plan to follow up with him in 4-6 weeks and will have him schedule a follow-up with you at that time as well.   Thanks! Ashlie

## 2024-09-06 ENCOUNTER — MYC MEDICAL ADVICE (OUTPATIENT)
Dept: FAMILY MEDICINE | Facility: CLINIC | Age: 39
End: 2024-09-06
Payer: COMMERCIAL

## 2024-09-07 DIAGNOSIS — F41.9 ANXIETY: ICD-10-CM

## 2024-09-07 DIAGNOSIS — F32.9 REACTIVE DEPRESSION: ICD-10-CM

## 2024-09-09 ENCOUNTER — MYC MEDICAL ADVICE (OUTPATIENT)
Dept: FAMILY MEDICINE | Facility: CLINIC | Age: 39
End: 2024-09-09
Payer: COMMERCIAL

## 2024-09-10 RX ORDER — DESVENLAFAXINE 50 MG/1
50 TABLET, FILM COATED, EXTENDED RELEASE ORAL DAILY
Qty: 30 TABLET | Refills: 0 | Status: SHIPPED | OUTPATIENT
Start: 2024-09-10 | End: 2024-09-29

## 2024-09-15 DIAGNOSIS — G43.709 CHRONIC MIGRAINE WITHOUT AURA WITHOUT STATUS MIGRAINOSUS, NOT INTRACTABLE: ICD-10-CM

## 2024-09-15 NOTE — TELEPHONE ENCOUNTER
RX Authorization    Medication: Rimegepant (NURTEC) 75 MG ODT tablet    Date last refill ordered: 1/23/2024    Quantity ordered: 8    # refills: 11    Date of last clinic visit with ordering provider: 7/15/2024    Date of next clinic visit with ordering provider:    All pertinent protocol data (lab date/result):    Include pertinent information from patients message:

## 2024-09-29 ENCOUNTER — MYC REFILL (OUTPATIENT)
Dept: FAMILY MEDICINE | Facility: CLINIC | Age: 39
End: 2024-09-29
Payer: COMMERCIAL

## 2024-09-29 DIAGNOSIS — F41.9 ANXIETY: ICD-10-CM

## 2024-09-29 DIAGNOSIS — F32.9 REACTIVE DEPRESSION: ICD-10-CM

## 2024-09-30 RX ORDER — DESVENLAFAXINE 50 MG/1
50 TABLET, FILM COATED, EXTENDED RELEASE ORAL DAILY
Qty: 90 TABLET | Refills: 0 | Status: SHIPPED | OUTPATIENT
Start: 2024-09-30

## 2024-10-03 ENCOUNTER — MYC MEDICAL ADVICE (OUTPATIENT)
Dept: PHARMACY | Facility: CLINIC | Age: 39
End: 2024-10-03
Payer: COMMERCIAL

## 2024-10-03 ASSESSMENT — HEADACHE IMPACT TEST (HIT 6)
HOW OFTEN DID HEADACHS LIMIT CONCENTRATION ON WORK OR DAILY ACTIVITY: SOMETIMES
HOW OFTEN DO HEADACHES LIMIT YOUR DAILY ACTIVITIES: SOMETIMES
WHEN YOU HAVE A HEADACHE HOW OFTEN DO YOU WISH YOU COULD LIE DOWN: SOMETIMES
HOW OFTEN HAVE YOU FELT FED UP OR IRRITATED BECAUSE OF YOUR HEADACHES: SOMETIMES
WHEN YOU HAVE HEADACHES HOW OFTEN IS THE PAIN SEVERE: RARELY
HIT6 TOTAL SCORE: 56
HOW OFTEN HAVE YOU FELT TOO TIRED TO WORK BECAUSE OF YOUR HEADACHES: RARELY

## 2024-10-07 ENCOUNTER — OFFICE VISIT (OUTPATIENT)
Dept: NEUROLOGY | Facility: CLINIC | Age: 39
End: 2024-10-07
Payer: COMMERCIAL

## 2024-10-07 DIAGNOSIS — G43.709 CHRONIC MIGRAINE WITHOUT AURA WITHOUT STATUS MIGRAINOSUS, NOT INTRACTABLE: Primary | ICD-10-CM

## 2024-10-07 PROCEDURE — 64615 CHEMODENERV MUSC MIGRAINE: CPT | Performed by: PSYCHIATRY & NEUROLOGY

## 2024-10-07 NOTE — PROGRESS NOTES
[facility]  Botulinum Toxin Procedure    Sol Linares MD  Headache Neurology    October 7, 2024    Procedure:  OnabotulinumtoxinA injections for chronic migraine  Indication:  Chronic migraine    Mr. Ellis suffers from severe intractable headaches.  He was referred by Dr. Linares for onabotulinumtoxinA injections for headache.  Risks, benefits, and alternatives were discussed.  All questions were answered and consent given.  He decided to proceed with the injections.      Prior to initiation of botulinum toxin injections, Mr. Ellis reported 20 headache days per month, with 20 severe headache days per month. His headaches are quite disabling and often interfere with his ability to function normally.     Last set of injections: July 15th, 2024     He reports 6-8 headache days per month, with 0 severe headache days per month. About 2 weeks of wearing off.     He takes Tylenol and Nurtec as needed.     He has attempted other migraine prophylactic treatments in the past, which have included: Emgality, desvenlafaxine, topiramate, amitriptyline, buspirone, venlafaxine, propranolol.       He currently takes desvenlafaxine for headache prevention.    Procedural Pause: Procedural pause was conducted to verify correct patient identity, procedure to be performed, correct side and site, correct patient position, and special requirements. Appropriate hand hygiene was utilized, and each injection site was prepped with alcohol wipe or Chloraprep swab.     Procedure Details: 200 units of onabotulinumtoxinA was diluted in 4 mL 0.9% normal saline. A total of 200 units of onabotulinumtoxinA were injected using 30 gauge 0.5 in needles into the muscles listed below. 0 units of onabotulinumtoxinA were wasted.      Injection Sites: Total = 200 units onabotulinumtoxinA       and Procerus muscles - 5 units into the left and right corrugators and 5 units into the procerus (15 units total)     Frontalis muscles - 5 units into  the left superior frontalis and 5 units into the right superior frontalis (2 injection sites per muscle) (10 units total)     Temporalis muscles - 25 units into the left temporalis muscle and 25 units into the right temporalis muscle (3 injection sites per muscle) (50 units total)     Occipitalis muscles - 25 units into the left occipitalis muscle and 25 units into the right occipitalis muscle (3 injection sites per muscle) (50 units total)     Splenius Capitis muscles - 12.5 units into the left splenius capitis muscle and 12.5 units into the right splenius capitis muscle (2 injection sites per muscle, divided into 2/3 anteriorly and 1/3 posteriorly) (25 units total)                 Trapezius muscles - 25 units into the left trapezius muscle and 25 units into the right trapezius muscle (3 injection sites per muscle, divided 5 units, 10 units, 10 units, medial to lateral) (50 units total)    Mr. Ellis tolerated the procedure well without immediate complications.  He will follow up in clinic for assessment of the effectiveness of treatment.  He did not report any change in his pain level after the botulinumtoxinA injection procedure.    Sol Linares MD  Headache Neurology  [facility]

## 2024-10-07 NOTE — LETTER
10/7/2024       RE: Eliezer Ellis  [address]  [facility] [address]     Dear Colleague,    Thank you for referring your patient, Eliezer Ellis, to the [facility] NEUROLOGY CLINIC [place] at [facility]. Please see a copy of my visit note below.    [facility]  Botulinum Toxin Procedure    Sol Linares MD  Headache Neurology    October 7, 2024    Procedure:  OnabotulinumtoxinA injections for chronic migraine  Indication:  Chronic migraine    Mr. Ellis suffers from severe intractable headaches.  He was referred by Dr. Linares for onabotulinumtoxinA injections for headache.  Risks, benefits, and alternatives were discussed.  All questions were answered and consent given.  He decided to proceed with the injections.      Prior to initiation of botulinum toxin injections, Mr. Ellis reported 20 headache days per month, with 20 severe headache days per month. His headaches are quite disabling and often interfere with his ability to function normally.     Last set of injections: July 15th, 2024     He reports 6-8 headache days per month, with 0 severe headache days per month. About 2 weeks of wearing off.     He takes Tylenol and Nurtec as needed.     He has attempted other migraine prophylactic treatments in the past, which have included: Emgality, desvenlafaxine, topiramate, amitriptyline, buspirone, venlafaxine, propranolol.       He currently takes desvenlafaxine for headache prevention.    Procedural Pause: Procedural pause was conducted to verify correct patient identity, procedure to be performed, correct side and site, correct patient position, and special requirements. Appropriate hand hygiene was utilized, and each injection site was prepped with alcohol wipe or Chloraprep swab.     Procedure Details: 200 units of onabotulinumtoxinA was diluted in 4 mL 0.9% normal saline. A total of 200 units of onabotulinumtoxinA were injected using 30 gauge  0.5 in needles into the muscles listed below. 0 units of onabotulinumtoxinA were wasted.      Injection Sites: Total = 200 units onabotulinumtoxinA       and Procerus muscles - 5 units into the left and right corrugators and 5 units into the procerus (15 units total)     Frontalis muscles - 5 units into the left superior frontalis and 5 units into the right superior frontalis (2 injection sites per muscle) (10 units total)     Temporalis muscles - 25 units into the left temporalis muscle and 25 units into the right temporalis muscle (3 injection sites per muscle) (50 units total)     Occipitalis muscles - 25 units into the left occipitalis muscle and 25 units into the right occipitalis muscle (3 injection sites per muscle) (50 units total)     Splenius Capitis muscles - 12.5 units into the left splenius capitis muscle and 12.5 units into the right splenius capitis muscle (2 injection sites per muscle, divided into 2/3 anteriorly and 1/3 posteriorly) (25 units total)                 Trapezius muscles - 25 units into the left trapezius muscle and 25 units into the right trapezius muscle (3 injection sites per muscle, divided 5 units, 10 units, 10 units, medial to lateral) (50 units total)    Mr. Ellis tolerated the procedure well without immediate complications.  He will follow up in clinic for assessment of the effectiveness of treatment.  He did not report any change in his pain level after the botulinumtoxinA injection procedure.    Sol Linares MD  Headache Neurology  [facility]      Again, thank you for allowing me to participate in the care of your patient.      Sincerely,    Sol Linares MD

## 2024-10-15 ASSESSMENT — ASTHMA QUESTIONNAIRES
QUESTION_1 LAST FOUR WEEKS HOW MUCH OF THE TIME DID YOUR ASTHMA KEEP YOU FROM GETTING AS MUCH DONE AT WORK, SCHOOL OR AT HOME: NONE OF THE TIME
QUESTION_2 LAST FOUR WEEKS HOW OFTEN HAVE YOU HAD SHORTNESS OF BREATH: NOT AT ALL
QUESTION_3 LAST FOUR WEEKS HOW OFTEN DID YOUR ASTHMA SYMPTOMS (WHEEZING, COUGHING, SHORTNESS OF BREATH, CHEST TIGHTNESS OR PAIN) WAKE YOU UP AT NIGHT OR EARLIER THAN USUAL IN THE MORNING: NOT AT ALL
QUESTION_5 LAST FOUR WEEKS HOW WOULD YOU RATE YOUR ASTHMA CONTROL: COMPLETELY CONTROLLED
QUESTION_4 LAST FOUR WEEKS HOW OFTEN HAVE YOU USED YOUR RESCUE INHALER OR NEBULIZER MEDICATION (SUCH AS ALBUTEROL): NOT AT ALL
ACT_TOTALSCORE: 25
ACT_TOTALSCORE: 25

## 2024-10-16 ASSESSMENT — PATIENT HEALTH QUESTIONNAIRE - PHQ9
SUM OF ALL RESPONSES TO PHQ QUESTIONS 1-9: 2
10. IF YOU CHECKED OFF ANY PROBLEMS, HOW DIFFICULT HAVE THESE PROBLEMS MADE IT FOR YOU TO DO YOUR WORK, TAKE CARE OF THINGS AT HOME, OR GET ALONG WITH OTHER PEOPLE: NOT DIFFICULT AT ALL
SUM OF ALL RESPONSES TO PHQ QUESTIONS 1-9: 2

## 2024-10-17 ENCOUNTER — ANCILLARY PROCEDURE (OUTPATIENT)
Dept: GENERAL RADIOLOGY | Facility: CLINIC | Age: 39
End: 2024-10-17
Attending: FAMILY MEDICINE
Payer: COMMERCIAL

## 2024-10-17 ENCOUNTER — OFFICE VISIT (OUTPATIENT)
Dept: FAMILY MEDICINE | Facility: CLINIC | Age: 39
End: 2024-10-17
Payer: COMMERCIAL

## 2024-10-17 VITALS
OXYGEN SATURATION: 98 % | RESPIRATION RATE: 16 BRPM | WEIGHT: 266.8 LBS | BODY MASS INDEX: 36.14 KG/M2 | HEIGHT: 72 IN | TEMPERATURE: 97.4 F | HEART RATE: 94 BPM | DIASTOLIC BLOOD PRESSURE: 83 MMHG | SYSTOLIC BLOOD PRESSURE: 133 MMHG

## 2024-10-17 DIAGNOSIS — M25.552 HIP PAIN, LEFT: Primary | ICD-10-CM

## 2024-10-17 DIAGNOSIS — M25.552 HIP PAIN, LEFT: ICD-10-CM

## 2024-10-17 DIAGNOSIS — H93.13: ICD-10-CM

## 2024-10-17 PROCEDURE — 73502 X-RAY EXAM HIP UNI 2-3 VIEWS: CPT | Mod: TC | Performed by: RADIOLOGY

## 2024-10-17 PROCEDURE — 69210 REMOVE IMPACTED EAR WAX UNI: CPT | Mod: LT | Performed by: FAMILY MEDICINE

## 2024-10-17 PROCEDURE — 99213 OFFICE O/P EST LOW 20 MIN: CPT | Mod: 25 | Performed by: FAMILY MEDICINE

## 2024-10-17 RX ORDER — FLUTICASONE PROPIONATE 50 MCG
1 SPRAY, SUSPENSION (ML) NASAL DAILY
Qty: 11.1 ML | Refills: 1 | Status: SHIPPED | OUTPATIENT
Start: 2024-10-17

## 2024-10-17 ASSESSMENT — PAIN SCALES - GENERAL: PAINLEVEL: NO PAIN (0)

## 2024-10-17 NOTE — PROGRESS NOTES
"  Assessment & Plan     1. Hip pain, left  Plan:- XR Hip Left 2-3 Views; no degenerative joint disease or fracture.  PHYSICAL THERAPY to assess and treat for muscular srain.  Follow up as needed    - Physical Therapy  Referral; Future    2. Ear clicks, bilateral  Plan\" partially impacted cerumen on left was removed by me by light curette, patient tolerated it well   Use of OTC antihistamine and if clicking still bothersome, see ENT  - Adult ENT  Referral; Future  - fluticasone (FLONASE) 50 MCG/ACT nasal spray; Spray 1 spray into both nostrils daily.  Dispense: 11.1 mL; Refill: 1        The longitudinal plan of care for the diagnosis(es)/condition(s) as documented were addressed during this visit. Due to the added complexity in care, I will continue to support Eliezer in the subsequent management and with ongoing continuity of care  I spent a total of 24 minutes on the day of the visit.   Time spent by me doing chart review, history and exam, documentation and further activities per the note          Subjective   Eliezer is a 39 year old, presenting for the following health issues:  Hip Pain and Ear Problem        10/17/2024    11:25 AM   Additional Questions   Roomed by Brandee     History of Present Illness       Reason for visit:  Hip pain, ear popping  Symptom onset:  More than a month  Symptom intensity:  Mild  Symptom progression:  Staying the same  Had these symptoms before:  No   He is taking medications regularly.   Ear pops is both side , wonder if its a nervous tic.  Notices some relief of pressure when it does happen       Pain History:  When did you first notice your pain? 2 month   Have you seen anyone else for your pain? No  How has your pain affected your ability to work? Not applicable  Where in your body do you have pain? Musculoskeletal problem/pain  Onset/Duration: 2 months   Description  Location: Hip - left  Joint Swelling: No  Redness: No  Pain: YES - Notices pain everytime he " sneezes, or if he is trying to put pants on   Warmth: No  Intensity:  mild  Progression of Symptoms:  Intermittent  Accompanying signs and symptoms:   Fevers: No  Numbness/tingling/weakness: No  History  Trauma to the area: No  Recent illness:  No  Previous similar problem: No  Previous evaluation:  No  Precipitating or alleviating factors:  Aggravating factors include: lifting  Therapies tried and outcome: None           Review of Systems  Constitutional, neuro, ENT, endocrine, pulmonary, cardiac, gastrointestinal, genitourinary, musculoskeletal, integument and psychiatric systems are negative, except as otherwise noted.      Objective    /83 (BP Location: Left arm, Patient Position: Sitting, Cuff Size: Adult Large)   Pulse 94   Temp 97.4  F (36.3  C) (Skin)   Resp 16   Ht 1.829 m (6')   Wt 121 kg (266 lb 12.8 oz)   SpO2 98%   BMI 36.18 kg/m    Body mass index is 36.18 kg/m .  Physical Exam   GENERAL: alert and no distress  HENT: normal cephalic/atraumatic, both ears: partially occlude cerumen- removed by me with a light curette , nose and mouth without ulcers or lesions, oropharynx clear, and oral mucous membranes moist  NECK: no adenopathy, no asymmetry, masses, or scars  RESP: lungs clear to auscultation - no rales, rhonchi or wheezes  CV: regular rate and rhythm, normal S1 S2, no S3 or S4, no murmur, click or rub, no peripheral edema  ABDOMEN: soft, nontender, no hepatosplenomegaly, no masses and bowel sounds normal  MS: no gross musculoskeletal defects noted, no edema            Signed Electronically by: Ursula Huang MD

## 2024-10-23 ENCOUNTER — TELEPHONE (OUTPATIENT)
Facility: CLINIC | Age: 39
End: 2024-10-23
Payer: COMMERCIAL

## 2024-10-25 DIAGNOSIS — E66.01 MORBID OBESITY (H): Primary | ICD-10-CM

## 2024-10-25 DIAGNOSIS — E66.01 MORBID OBESITY (H): ICD-10-CM

## 2024-10-25 DIAGNOSIS — F32.9 REACTIVE DEPRESSION: ICD-10-CM

## 2024-10-25 DIAGNOSIS — F41.9 ANXIETY: ICD-10-CM

## 2024-10-25 NOTE — TELEPHONE ENCOUNTER
Hello,    Patient is looking to increase to next dose (1mg).    Thank you,    Noris Chiang CPhT, JUAN DIEGO    [facility]  [address] [place] [id number]   [email or web address]  [email or web address]   Phone: [phone]  Fax: [phone]

## 2024-10-27 DIAGNOSIS — M1A.4720 CHRONIC GOUT DUE TO OTHER SECONDARY CAUSE INVOLVING TOE OF LEFT FOOT WITHOUT TOPHUS: ICD-10-CM

## 2024-10-28 RX ORDER — ALLOPURINOL 100 MG/1
100 TABLET ORAL DAILY
Qty: 90 TABLET | Refills: 2 | OUTPATIENT
Start: 2024-10-28

## 2024-10-28 RX ORDER — DESVENLAFAXINE 50 MG/1
50 TABLET, FILM COATED, EXTENDED RELEASE ORAL DAILY
Qty: 90 TABLET | Refills: 0 | OUTPATIENT
Start: 2024-10-28

## 2024-11-01 ENCOUNTER — OFFICE VISIT (OUTPATIENT)
Dept: DERMATOLOGY | Facility: CLINIC | Age: 39
End: 2024-11-01
Payer: COMMERCIAL

## 2024-11-01 DIAGNOSIS — Q82.5 CONGENITAL NEVUS: ICD-10-CM

## 2024-11-01 DIAGNOSIS — L57.8 DERMATOHELIOSIS: ICD-10-CM

## 2024-11-01 DIAGNOSIS — L81.4 LENTIGINES: ICD-10-CM

## 2024-11-01 DIAGNOSIS — D48.9 NEOPLASM OF UNCERTAIN BEHAVIOR: Primary | ICD-10-CM

## 2024-11-01 PROCEDURE — 99213 OFFICE O/P EST LOW 20 MIN: CPT | Mod: 25 | Performed by: STUDENT IN AN ORGANIZED HEALTH CARE EDUCATION/TRAINING PROGRAM

## 2024-11-01 PROCEDURE — 11102 TANGNTL BX SKIN SINGLE LES: CPT | Performed by: STUDENT IN AN ORGANIZED HEALTH CARE EDUCATION/TRAINING PROGRAM

## 2024-11-01 PROCEDURE — 88342 IMHCHEM/IMCYTCHM 1ST ANTB: CPT | Mod: 26 | Performed by: PATHOLOGY

## 2024-11-01 PROCEDURE — 88305 TISSUE EXAM BY PATHOLOGIST: CPT | Mod: TC | Performed by: STUDENT IN AN ORGANIZED HEALTH CARE EDUCATION/TRAINING PROGRAM

## 2024-11-01 PROCEDURE — 88305 TISSUE EXAM BY PATHOLOGIST: CPT | Mod: 26 | Performed by: PATHOLOGY

## 2024-11-01 ASSESSMENT — PAIN SCALES - GENERAL: PAINLEVEL_OUTOF10: NO PAIN (0)

## 2024-11-01 NOTE — PATIENT INSTRUCTIONS
"Post Biopsy Site Care for Shave Biopsy  Keep the bandaid on until tomorrow and keep the area clean and dry.  Wash with mild soap and water every day until wound appears well-healed. Rinse well and pat dry.  Apply Vaseline over site with a Q-tip and Re-apply a bandaid until wound appears well-healed.  A well-healed wound is \"pinked over\" and has a shiny appearance.    Call the clinic right away if you notice any signs or symptoms of infection, such as: warmth or redness at the site, fever over 100 degrees F, yellow/creamy or foul-smelling drainage, or pain at the site.   Biopsy results typically come back in about 1 week. If your results are urgent you will be notified by phone. Otherwise, the results will be mailed to you. If you have not heard from us within 2 weeks call the clinic.  Please do not hesitate to call the clinic with any questions or concerns. We are here Mon-Fri from 8am-5pm and can be reached at [phone].     "

## 2024-11-01 NOTE — LETTER
11/1/2024       RE: Eliezer Ellis  [address]  [facility] [address]     Dear Colleague,    Thank you for referring your patient, Eliezer Ellis, to the [facility] DERMATOLOGY CLINIC [place] at [facility]. Please see a copy of my visit note below.         Dermatology Consultation      Patient: Eliezer Ellis  Date: Nov 1, 2024  Attending: Aurora Arana MD      Dermatology Problem List     Perioral dermatitis, prescribed metronidazole cream at [facility] 2022  NUB, frontal scalp, ddx BCC vs. Telangiectasia, bx 11/1/24      Reason for Referral/CC     Chief Complaint   Patient presents with     Derm Problem     Spot on forehead x 2 years     History of Present Illness     Eliezer Ellis is a 39 year old male presenting with a 1.5-2 year history of an erythematous papule on the forehead, which has not been tender or bleeding, but has been persistent. He has had a history of perioral dermatitis as noted above. No history of skin cancer. His perioral dermatitis is well controlled at this time.     Review of systems is otherwise negative except as noted above.    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    Physical Exam:     Vitals: There were no vitals taken for this visit.  SKIN: Waist-up skin, which includes the head/face, neck, both arms, chest, back, abdomen, digits and/or nails was examined.  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       ASSESSMENT AND PLAN:     # NUB, frontal scalp, ddx BCC vs. Telangiectasia, bx 11/1/24  - Shave biopsy procedure note, location(s): frontal scalp. After discussion of benefits and risks including but not limited to bleeding, infection, scar, incomplete removal, recurrence, and non-diagnostic biopsy, written consent and photographs were obtained. The area was cleaned with isopropyl alcohol. 0.5mL of 1% lidocaine with epinephrine was injected to obtain adequate anesthesia of lesion(s). Shave biopsy at site(s) performed. Hemostasis was achieved with aluminium chloride. Petrolatum ointment and a sterile dressing were applied. The patient tolerated the procedure and no complications were noted. The patient was provided with verbal and written post care instructions.          # Benign skin lesions: seborrheic keratoses, cherry angiomas, multiple benign nevi, lentigines  - Reassured benign etiology.  - No treatment required today.   - Recommended diligent sun protection with SPF 30 or higher. Handout provided.  - Discussed signs and symptoms of skin cancers and ABCDEs of melanoma.      Follow-Up:   Return to clinic depending on biopsy result, or sooner as needed      Aurora Arana MD  Adjunct  of Dermatology  Cell: [phone]      Again, thank you for allowing me to participate in the care of your patient.      Sincerely,    Aurora Arana MD

## 2024-11-01 NOTE — NURSING NOTE
Dermatology Rooming Note    Eliezer Ellis's goals for this visit include:   Chief Complaint   Patient presents with    Derm Problem     Spot on forehead x 2 years     Erin Abbasi LPN

## 2024-11-01 NOTE — NURSING NOTE
Lidocaine-epinephrine 1-1:528058 % injection   0.5mL once for one use, starting 11/1/2024 ending 11/1/2024,  2mL disp, R-0, injection  Injected by Erin Abbasi LPN

## 2024-11-01 NOTE — PROGRESS NOTES
Dermatology Consultation      Patient: Eliezer Ellis  Date: Nov 1, 2024  Attending: Aurora Arana MD      Dermatology Problem List     Perioral dermatitis, prescribed metronidazole cream at [facility] 2022  NUB, frontal scalp, ddx BCC vs. Telangiectasia, bx 11/1/24      Reason for Referral/CC     Chief Complaint   Patient presents with    Derm Problem     Spot on forehead x 2 years     History of Present Illness     Eliezer Ellis is a 39 year old male presenting with a 1.5-2 year history of an erythematous papule on the forehead, which has not been tender or bleeding, but has been persistent. He has had a history of perioral dermatitis as noted above. No history of skin cancer. His perioral dermatitis is well controlled at this time.     Review of systems is otherwise negative except as noted above.    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    Physical Exam:     Vitals: There were no vitals taken for this visit.  SKIN: Waist-up skin, which includes the head/face, neck, both arms, chest, back, abdomen, digits and/or nails was examined.  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       ASSESSMENT AND PLAN:     #  NUB, frontal scalp, ddx BCC vs. Telangiectasia, bx 11/1/24  - Shave biopsy procedure note, location(s): frontal scalp. After discussion of benefits and risks including but not limited to bleeding, infection, scar, incomplete removal, recurrence, and non-diagnostic biopsy, written consent and photographs were obtained. The area was cleaned with isopropyl alcohol. 0.5mL of 1% lidocaine with epinephrine was injected to obtain adequate anesthesia of lesion(s). Shave biopsy at site(s) performed. Hemostasis was achieved with aluminium chloride. Petrolatum ointment and a sterile dressing were applied. The patient tolerated the procedure and no complications were noted. The patient was provided with verbal and written post care instructions.          # Benign skin lesions: seborrheic keratoses, cherry angiomas, multiple benign nevi, lentigines  - Reassured benign etiology.  - No treatment required today.   - Recommended diligent sun protection with SPF 30 or higher. Handout provided.  - Discussed signs and symptoms of skin cancers and ABCDEs of melanoma.      Follow-Up:   Return to clinic depending on biopsy result, or sooner as needed      Aurora Arana MD  Adjunct  of Dermatology  Cell: [phone]

## 2024-11-06 LAB
PATH REPORT.COMMENTS IMP SPEC: NORMAL
PATH REPORT.COMMENTS IMP SPEC: NORMAL
PATH REPORT.FINAL DX SPEC: NORMAL
PATH REPORT.GROSS SPEC: NORMAL
PATH REPORT.MICROSCOPIC SPEC OTHER STN: NORMAL
PATH REPORT.RELEVANT HX SPEC: NORMAL

## 2024-11-08 NOTE — RESULT ENCOUNTER NOTE
Dear Eliezer,    Great news! Your biopsy shows a benign growth/thickening of the skin called a seborrheic keratosis. This is a normal type of growth and does not require any additional treatment.     Thank you,  Dr. Aleman

## 2024-11-15 ENCOUNTER — VIRTUAL VISIT (OUTPATIENT)
Dept: PHARMACY | Facility: CLINIC | Age: 39
End: 2024-11-15
Attending: INTERNAL MEDICINE
Payer: COMMERCIAL

## 2024-11-15 VITALS — WEIGHT: 260 LBS | HEIGHT: 72 IN | BODY MASS INDEX: 35.21 KG/M2

## 2024-11-15 DIAGNOSIS — E66.01 MORBID OBESITY (H): ICD-10-CM

## 2024-11-15 ASSESSMENT — PAIN SCALES - GENERAL: PAINLEVEL_OUTOF10: NO PAIN (0)

## 2024-11-15 NOTE — Clinical Note
MCKENNA Martins is doing well on the compounded semaglutide, planning to hold at 1 mg dose. I'll follow-up with him in January and encouraged him to schedule a follow-up with you as well.   Ashlie Stewart, RominaD

## 2024-11-15 NOTE — NURSING NOTE
Current patient location: Patient declined to provide     Is the patient currently in the state of [place]? YES    Visit mode:TELEPHONE    If the visit is dropped, the patient can be reconnected by:TELEPHONE VISIT: Phone number:   Telephone Information:   Mobile [phone]       Will anyone else be joining the visit? NO  (If patient encounters technical issues they should call [phone] :[phone])    Are changes needed to the allergy or medication list? No    Are refills needed on medications prescribed by this physician? NO    Rooming Documentation:  Questionnaire(s) not pre-assigned    Reason for visit: Medication Therapy Management    Lavonne ESPINAL

## 2024-11-15 NOTE — PROGRESS NOTES
"Medication Therapy Management (MTM) Encounter    ASSESSMENT:                            Medication Adherence/Access: No issues identified.    Weight management   Patient would benefit from continuing pharmacotherapy for weight management. Given tolerating well and class II obesity, recommend maintaining GLP1/GIP therapy as data to support most significant weight loss. Patient realizing benefit from reduction in food noise, increased satiety and reduction of cravings will maintaining adequate nutrition. Consistent weight loss since starting. Nausea and constipation side effects common with GLP1 use, can be supported with hydration and fiber intake.   Education provided on continued dietary and behavioral modifications with focus on protein and water to optimize effectiveness of semaglutide.       PLAN:                            Continue compounded semaglutide 1 mg once weekly. Prescription updated with ongoing refills.   Increase water intake by 12-24 oz more. Goal of 64 oz daily.   Consider changing fiber supplement to Metamucil, Benefiber, or Citrucel powder for more consistent fiber supplementation.   Consider shifting timing of semaglutide injection to Friday mornings to align side effects during overnight hours while sleeping.   Schedule follow-up with Dr. Qiana Roblero     Follow-up: 1/17/25 at 1pm with Romina De La OD     SUBJECTIVE/OBJECTIVE:                          Eliezer Ellis is a 39 year old male seen for a follow-up visit.       Reason for visit: compounded semaglutide follow-up.    Allergies/ADRs: Reviewed in chart  Past Medical History: Reviewed in chart  Tobacco: He reports that he has never smoked. He has never used smokeless tobacco.  Alcohol: 1-3 beverages / week    Medication Adherence/Access: no issues reported.    Weight Management   Compounded semaglutide 1 mg x 1 week  - each dose increase includes nausea - third week feels better  12+ hours later - nausea \"spells\" 15-20 " minutes, resolves   Had these experiences before starting medication, unknown cause - possibly correlated with migraines  Frequency has increased   Quesy drops, ginger ale, liquid IV, lay down, kale pectate   Fairlife shakes, meat sticks, [employer] protein bar, yogurt   Some more morning symptoms - switched to low lactose creamer  - hard to determine natural progression vs helpfulness of options  - consistent meal schedule  Takes Friday night - by Saturday afternoon/evening (regardless of eating pattern on Saturday)  - constipation - some pain, fiber supplement - 2-3 gummies; water - 1 liquid IV 16 oz, sparkling water (2 cans), 2/3 of 24 oz bottle   - working on high protein food choices - experimenting with various foods, options    Nutrition/Eating Habits: Filling cleaning faster, satiated more quickly - leaving food on plate compared to before  Decreased food noise, not   Exercise/activity: energy feels stable, no significant change. Looking to increase exercise - free gym membership through partner's work - lifetime, overwhelming  Medications Tried/Failed/Considered:  Phentermine: hx anxiety  Topiramate: side effects of mouth tingling  Bupropion: hx anxiety     Initial Consult Weight: 270 lbs (6/25/2024)       Current weight today: 256 lbs   Cumulative Weight Loss: -14 lb, -5.2% from baseline    Wt Readings from Last 4 Encounters:   11/15/24 260 lb (117.9 kg)   10/17/24 266 lb 12.8 oz (121 kg)   06/26/24 270 lb (122.5 kg)   06/25/24 270 lb (122.5 kg)     Estimated body mass index is 35.26 kg/m  as calculated from the following:    Height as of this encounter: 6' (1.829 m).    Weight as of this encounter: 260 lb (117.9 kg).      Today's Vitals: Ht 6' (1.829 m)   Wt 260 lb (117.9 kg)   BMI 35.26 kg/m    ----------------    I spent 34 minutes with this patient today. All changes were made via collaborative practice agreement with Qiana Roblero. A copy of the visit note was provided to the patient's  provider(s).    A summary of these recommendations was sent via [employer].    Ashlie Stewart, PharmD, BCACP  Medication Therapy Management (MTM) Pharmacist   [facility]     Telemedicine Visit Details  The patient's medications can be safely assessed via a telemedicine encounter.  Type of service:  Telephone visit  Originating Location (pt. Location): Home    Distant Location (provider location):  Off-site  Start Time: 1:05 PM  End Time: 1:39 PM     Medication Therapy Recommendations  Morbid obesity (H)   1 Rationale: Undesirable effect - Adverse medication event - Safety   Recommendation: Provide Education   Status: Patient Agreed - Adherence/Education   Identified Date: 11/15/2024 Completed Date: 11/15/2024

## 2024-11-15 NOTE — PATIENT INSTRUCTIONS
"Recommendations from today's MTM visit:                                                    Continue compounded semaglutide 1 mg once weekly. Prescription updated with ongoing refills.   Increase water intake by 12-24 oz more. Goal of 64 oz daily.   Consider changing fiber supplement to Metamucil, Benefiber, or Citrucel powder for more consistent fiber supplementation.   Consider shifting timing of semaglutide injection to Friday mornings to align side effects during overnight hours while sleeping.   Schedule follow-up with Dr. Qiana Roblero     Follow-up: 1/17/25 at 1pm with Ashlie Stewart PharmD     It was great speaking with you today.  I value your experience and would be very thankful for your time in providing feedback in our clinic survey. In the next few days, you may receive an email or text message from [employer] with a link to a survey related to your  clinical pharmacist.\"     To schedule another MTM appointment, please call the clinic directly or you may call the MTM scheduling line at [phone] or toll-free at [phone].     My Clinical Pharmacist's contact information:                                                      Please feel free to contact me with any questions or concerns you have.      Ashlie Stewart, Alexsandra, [facility]  Medication Therapy Management (MTM) Pharmacist   [facility]     "

## 2024-12-02 ASSESSMENT — ACTIVITIES OF DAILY LIVING (ADL)
SITTING FOR 15 MINUTES: NO DIFFICULTY AT ALL
WALKING_UP_STEEP_HILLS: NO DIFFICULTY AT ALL
HOW_WOULD_YOU_RATE_YOUR_CURRENT_LEVEL_OF_FUNCTION_DURING_YOUR_USUAL_ACTIVITIES_OF_DAILY_LIVING_FROM_0_TO_100_WITH_100_BEING_YOUR_LEVEL_OF_FUNCTION_PRIOR_TO_YOUR_HIP_PROBLEM_AND_0_BEING_THE_INABILITY_TO_PERFORM_ANY_OF_YOUR_USUAL_DAILY_ACTIVITIES?: 90
HEAVY_WORK: SLIGHT DIFFICULTY
LIGHT_TO_MODERATE_WORK: NO DIFFICULTY AT ALL
GOING_UP_1_FLIGHT_OF_STAIRS: NO DIFFICULTY AT ALL
STEPPING UP AND DOWN CURBS: NO DIFFICULTY AT ALL
WALKING_DOWN_STEEP_HILLS: NO DIFFICULTY AT ALL
HOS_ADL_HIGHEST_POTENTIAL_SCORE: 64
HOW_WOULD_YOU_RATE_YOUR_CURRENT_LEVEL_OF_FUNCTION_DURING_YOUR_USUAL_ACTIVITIES_OF_DAILY_LIVING_FROM_0_TO_100_WITH_100_BEING_YOUR_LEVEL_OF_FUNCTION_PRIOR_TO_YOUR_HIP_PROBLEM_AND_0_BEING_THE_INABILITY_TO_PERFORM_ANY_OF_YOUR_USUAL_DAILY_ACTIVITIES?: 90
HOS_ADL_ITEM_SCORE_TOTAL: 60
SITTING_FOR_15_MINUTES: NO DIFFICULTY AT ALL
WALKING_15_MINUTES_OR_GREATER: SLIGHT DIFFICULTY
STANDING_FOR_15_MINUTES: NO DIFFICULTY AT ALL
ROLLING OVER IN BED: NO DIFFICULTY AT ALL
SPORTS_COUNT: 9
GOING DOWN 1 FLIGHT OF STAIRS: NO DIFFICULTY AT ALL
ADL_TOTAL_ITEM_SCORE: 0
SPORTS_HIGHEST_POTENTIAL_SCORE: 36
STEPPING_UP_AND_DOWN_CURBS: NO DIFFICULTY AT ALL
WALKING_15_MINUTES_OR_GREATER: SLIGHT DIFFICULTY
HOS_ADL_SCORE(%): 93.75
WALKING_DOWN_STEEP_HILLS: NO DIFFICULTY AT ALL
PUTTING ON SOCKS AND SHOES: SLIGHT DIFFICULTY
WALKING_INITIALLY: NO DIFFICULTY AT ALL
HOW_WOULD_YOU_RATE_YOUR_CURRENT_LEVEL_OF_FUNCTION?: NEARLY NORMAL
TWISTING/PIVOTING_ON_INVOLVED_LEG: SLIGHT DIFFICULTY
TWISTING/PIVOTING ON INVOLVED LEG: SLIGHT DIFFICULTY
HOW_WOULD_YOU_RATE_YOUR_CURRENT_LEVEL_OF_FUNCTION_DURING_YOUR_SPORTS_RELATED_ACTIVITIES_FROM_0_TO_100_WITH_100_BEING_YOUR_LEVEL_OF_FUNCTION_PRIOR_TO_YOUR_HIP_PROBLEM_AND_0_BEING_THE_INABILITY_TO_PERFORM_ANY_OF_YOUR_USUAL_DAILY_ACTIVITIES?: 90
HEAVY_WORK: SLIGHT DIFFICULTY
GETTING_INTO_AND_OUT_OF_A_BATHTUB: NO DIFFICULTY AT ALL
WALKING_APPROXIMATELY_10_MINUTES: NO DIFFICULTY AT ALL
GOING_DOWN_1_FLIGHT_OF_STAIRS: NO DIFFICULTY AT ALL
JUMPING: SLIGHT DIFFICULTY
WALKING_INITIALLY: NO DIFFICULTY AT ALL
GOING UP 1 FLIGHT OF STAIRS: NO DIFFICULTY AT ALL
RECREATIONAL ACTIVITIES: SLIGHT DIFFICULTY
STANDING FOR 15 MINUTES: NO DIFFICULTY AT ALL
ABILITY_TO_PERFORM_ACTIVITY_WITH_YOUR_NORMAL_TECHNIQUE: SLIGHT DIFFICULTY
ADL_HIGHEST_POTENTIAL_SCORE: 68
ADL_COUNT: 17
RECREATIONAL_ACTIVITIES: SLIGHT DIFFICULTY
GETTING_INTO_AND_OUT_OF_AN_AVERAGE_CAR: NO DIFFICULTY AT ALL
PLEASE_INDICATE_YOR_PRIMARY_REASON_FOR_REFERRAL_TO_THERAPY:: HIP
SPORTS_TOTAL_ITEM_SCORE: 0
GETTING INTO AND OUT OF AN AVERAGE CAR: NO DIFFICULTY AT ALL
LIGHT_TO_MODERATE_WORK: NO DIFFICULTY AT ALL
SPORTS_SCORE(%): 0
WALKING_FOR_APPROXIMATELY_10_MINUTES: NO DIFFICULTY AT ALL
PUTTING_ON_SOCKS_AND_SHOES: SLIGHT DIFFICULTY
WALKING_UP_STEEP_HILLS: NO DIFFICULTY AT ALL
ADL_SCORE(%): 0
GETTING_INTO_AND_OUT_OF_A_BATHTUB: NO DIFFICULTY AT ALL
ROLLING_OVER_IN_BED: NO DIFFICULTY AT ALL

## 2024-12-05 ENCOUNTER — THERAPY VISIT (OUTPATIENT)
Dept: PHYSICAL THERAPY | Facility: CLINIC | Age: 39
End: 2024-12-05
Attending: FAMILY MEDICINE
Payer: COMMERCIAL

## 2024-12-05 DIAGNOSIS — G89.29 CHRONIC BILATERAL LOW BACK PAIN WITHOUT SCIATICA: ICD-10-CM

## 2024-12-05 DIAGNOSIS — M54.50 CHRONIC BILATERAL LOW BACK PAIN WITHOUT SCIATICA: ICD-10-CM

## 2024-12-05 DIAGNOSIS — F32.9 REACTIVE DEPRESSION: ICD-10-CM

## 2024-12-05 DIAGNOSIS — M25.552 HIP PAIN, LEFT: Primary | ICD-10-CM

## 2024-12-05 DIAGNOSIS — F41.9 ANXIETY: ICD-10-CM

## 2024-12-05 RX ORDER — DESVENLAFAXINE 50 MG/1
50 TABLET, FILM COATED, EXTENDED RELEASE ORAL DAILY
Qty: 30 TABLET | Refills: 0 | Status: SHIPPED | OUTPATIENT
Start: 2024-12-05

## 2024-12-05 NOTE — PROGRESS NOTES
PHYSICAL THERAPY EVALUATION  Type of Visit: Evaluation       Fall Risk Screen:  Fall screen completed by: PT  Have you fallen 2 or more times in the past year?: No  Have you fallen and had an injury in the past year?: No  Is patient a fall risk?: No    Pt reports pain in hip crease in left hip when putting on pants. Pt also has noted pain while sneezing in hip. Pt denies past history of hernia, pain with lifting heavier weights, or while coughing.      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 Most apparent when sneezing (!) or if i catch my foot on a curb or ledge.  Date of onset: 12/05/24    Relevant medical history: (Patient-Rptd) Arthritis; Asthma; High blood pressure; Migraines or headaches; Overweight; Thyroid problems   Dates & types of surgery:      Prior diagnostic imaging/testing results: (Patient-Rptd) X-ray     Prior therapy history for the same diagnosis, illness or injury: (Patient-Rptd) No        Living Environment  Social support: (Patient-Rptd) With a significant other or spouse   Type of home: (Patient-Rptd) House   Stairs to enter the home: (Patient-Rptd) Yes (Patient-Rptd) 3 Is there a railing: (Patient-Rptd) Yes     Ramp: (Patient-Rptd) No   Stairs inside the home: (Patient-Rptd) Yes (Patient-Rptd) 24 Is there a railing: (Patient-Rptd) Yes     Help at home: (Patient-Rptd) None  Equipment owned:       Employment: (Patient-Rptd) Yes (Patient-Rptd)  Communications and   Hobbies/Interests:      Patient goals for therapy: (Patient-Rptd) Sneeze    Pain assessment: see above      Objective     PALPATION: TTP psoas and iliacus     POSTURE: Sitting Posture: Rounded shoulders, Forward head    GAIT:   Weightbearing Status: WBAT  Assistive Device(s): None  Gait Deviations: WFL    BALANCE: Standing Balance (static):Good    RANGE OF MOTION:     (Degrees) AROM PROM    Left Right Left Right   Hip Flexion  75% 75%     Hip Extension        Hip Abduction       Hip Adduction        Hip Internal Rotation WNL WNL     Hip External Rotation WNL WNL         FLEXIBILITY: Decreased hamstrings L, Decreased hamstrings R    SPECIAL TESTS:    LUMBAR/ HIP SPECIAL TESTS:    Left Right   CELESTE +/- +/-   FADIR/Labrum/ERICA Negative  Negative    Femoral Nerve     Agnieszka's     Piriformis Negative  Negative    Quadrant Testing     SLR Negative  Negative    Slump     Stork with Extension     Ismael     Scour Negative  Negative        FUNCTIONAL TESTS: SLS: difficulty B, L<R   Sidelying SLR: unable to complete due to weakness on L       Assessment & Plan   CLINICAL IMPRESSIONS  Medical Diagnosis: L hip pain    Treatment Diagnosis:     Impression/Assessment: Patient is a 39 year old male with complaints of L hip pain.  The following significant findings have been identified: Pain, Decreased ROM/flexibility, Decreased strength, and Impaired balance. These impairments interfere with their ability to perform self care tasks, household mobility, and community mobility as compared to previous level of function.     Clinical Decision Making (Complexity):  Clinical Presentation: Stable/Uncomplicated  Clinical Presentation Rationale: based on medical and personal factors listed in PT evaluation  Clinical Decision Making (Complexity): Low complexity    PLAN OF CARE  Treatment Interventions:  Interventions: Gait Training, Manual Therapy, Neuromuscular Re-education, Therapeutic Activity,  Therapeutic Exercise    Long Term Goals     PT Goal 1  Goal Identifier: HEP  Goal Description: Pt will demonstrate compliance and understanding of HEP in order to manage hip pain.  Goal Progress: progressing  Target Date: 03/04/25  PT Goal 2  Goal Identifier: Gym  Goal Description: Pt will demonstrate ability to complete deadlift and squats with weight without pain in order to return to gym.  Goal Progress: progressing  Target Date: 03/04/25      Frequency of Treatment: every other week  Duration of Treatment: 90 days    Education Assessment:   Learner/Method: Patient  Education Comments: Pt ammenabe to tx    Risks and benefits of evaluation/treatment have been explained.   Patient/Family/caregiver agrees with Plan of Care.     Evaluation Time:           Signing Clinician: Elizabeth Vivar, PT

## 2024-12-10 DIAGNOSIS — M1A.4720 CHRONIC GOUT DUE TO OTHER SECONDARY CAUSE INVOLVING TOE OF LEFT FOOT WITHOUT TOPHUS: ICD-10-CM

## 2024-12-10 RX ORDER — ALLOPURINOL 100 MG/1
100 TABLET ORAL DAILY
Qty: 90 TABLET | Refills: 0 | Status: SHIPPED | OUTPATIENT
Start: 2024-12-10

## 2024-12-17 ENCOUNTER — PATIENT OUTREACH (OUTPATIENT)
Dept: CARE COORDINATION | Facility: CLINIC | Age: 39
End: 2024-12-17
Payer: COMMERCIAL

## 2024-12-22 DIAGNOSIS — F32.9 REACTIVE DEPRESSION: ICD-10-CM

## 2024-12-22 DIAGNOSIS — F41.9 ANXIETY: ICD-10-CM

## 2024-12-23 RX ORDER — DESVENLAFAXINE 50 MG/1
50 TABLET, FILM COATED, EXTENDED RELEASE ORAL DAILY
Qty: 30 TABLET | Refills: 0 | Status: SHIPPED | OUTPATIENT
Start: 2024-12-23

## 2024-12-31 ENCOUNTER — TELEPHONE (OUTPATIENT)
Dept: NEUROLOGY | Facility: CLINIC | Age: 39
End: 2024-12-31
Payer: COMMERCIAL

## 2024-12-31 ENCOUNTER — PATIENT OUTREACH (OUTPATIENT)
Dept: CARE COORDINATION | Facility: CLINIC | Age: 39
End: 2024-12-31
Payer: COMMERCIAL

## 2024-12-31 NOTE — TELEPHONE ENCOUNTER
Left Voicemail (1st Attempt) and Sent Mychart (1st Attempt) for the patient to call back and schedule the following:    Appointment type: UMP Botox-Standard  Provider: Dora  Return date: 1/14/2025  Specialty phone number: [phone]  Additional appointment(s) needed: NA  Additonal Notes: R/S 1/7 appt as provider will be out of office    Will need to manually schedule 1/14/2025 appointment. As these are held for 1/7/2025 rescdavid Wang on 12/31/2024 at 3:29 PM

## 2025-01-09 DIAGNOSIS — G43.709 CHRONIC MIGRAINE WITHOUT AURA WITHOUT STATUS MIGRAINOSUS, NOT INTRACTABLE: Primary | ICD-10-CM

## 2025-01-11 ASSESSMENT — HEADACHE IMPACT TEST (HIT 6)
HIT6 TOTAL SCORE: 54
WHEN YOU HAVE A HEADACHE HOW OFTEN DO YOU WISH YOU COULD LIE DOWN: SOMETIMES
HOW OFTEN DID HEADACHS LIMIT CONCENTRATION ON WORK OR DAILY ACTIVITY: SOMETIMES
HOW OFTEN DO HEADACHES LIMIT YOUR DAILY ACTIVITIES: SOMETIMES
HOW OFTEN HAVE YOU FELT TOO TIRED TO WORK BECAUSE OF YOUR HEADACHES: NEVER
WHEN YOU HAVE HEADACHES HOW OFTEN IS THE PAIN SEVERE: RARELY
HOW OFTEN HAVE YOU FELT FED UP OR IRRITATED BECAUSE OF YOUR HEADACHES: SOMETIMES

## 2025-01-14 ENCOUNTER — OFFICE VISIT (OUTPATIENT)
Dept: NEUROLOGY | Facility: CLINIC | Age: 40
End: 2025-01-14
Payer: COMMERCIAL

## 2025-01-14 DIAGNOSIS — G43.709 CHRONIC MIGRAINE WITHOUT AURA WITHOUT STATUS MIGRAINOSUS, NOT INTRACTABLE: Primary | ICD-10-CM

## 2025-01-14 NOTE — LETTER
1/14/2025       RE: Eliezer Ellis  [address]  [place] [address]     Dear Colleague,    Thank you for referring your patient, Eliezer Ellis, to the [facility] [place] at [facility]. Please see a copy of my visit note below.    Botulinum Toxin Procedure Note    Eliezer Ellis  [id number]    Ordering provider: Dr Linares.    The procedure was explained to the patient. Benefits of the treatment were discussed including headache and migraine reduction. Risks of the procedure were reviewed including but not limited to pain, bruising, bleeding, infection, weakness of muscles injected or those distal to injection. The patient voiced understanding of the risks and benefits. All questions answered and the patient consented to proceed.     Prior to receiving Botox injections the patient reported the following:  Baseline headache/migraine frequency: 20 days per month baseline.   Baseline headache/migraine duration: 18 hours  Baseline MIDAS score: 21  Associated migrainous features: photophobia, phonophobia, nausea    Previous treatment trials: Emgality, desvenlafaxine, topiramate, amitriptyline, buspirone, venlafaxine, propranolol     Last Botox procedure: 10/7/24 with Dr Linares  Current migraine frequency: after 2 weeks headache frequency goes down then starts to wear off after 2 months and builds. Down to 8 days a month at best stage.   Current migraine duration: 3-6 hours    Functional improvements since starting botulinum toxin treatments: able to be more effective at work, more productive, more social activities in the evenings.     Last Neurology office visit: 7/27/22    A 200 unit vial of Botox was diluted with 4ml of 0.9% sodium chloride    Botox lot # and expiration date: See MAR for details  0.9% sodium chloride lot # and expiration date: See MAR for details    The following muscles were injected using a 30 gauge  needle:   and Procerus muscles - 5 units into the left and right corrugators and 5 units into the procerus (15 units total)     Frontalis muscles - 5 units into the left superior frontalis and 5 units into the right superior frontalis (2 injection sites per muscle) (10 units total)     Temporalis muscles - 25 units into the left temporalis muscle and 25 units into the right temporalis muscle (3 injection sites per muscle) (50 units total)     Occipitalis muscles - 25 units into the left occipitalis muscle and 25 units into the right occipitalis muscle (3 injection sites per muscle) (50 units total)     Splenius Capitis muscles - 12.5 units into the left splenius capitis muscle and 12.5 units into the right splenius capitis muscle (2 injection sites per muscle, divided into 2/3 anteriorly and 1/3 posteriorly) (25 units total)     Trapezius muscles - 25 units into the left trapezius muscle and 25 units into the right trapezius muscle (3 injection sites per muscle, divided 5 units, 10 units, 10 units, medial to lateral) (50 units total)    The patient tolerated the injections well. I was present for and performed the entire procedure.     Trisha John MD      Again, thank you for allowing me to participate in the care of your patient.      Sincerely,    Trisha John MD

## 2025-01-14 NOTE — PROGRESS NOTES
Botulinum Toxin Procedure Note    Eliezer Ellis  [id number]    Ordering provider: Dr Linares.    The procedure was explained to the patient. Benefits of the treatment were discussed including headache and migraine reduction. Risks of the procedure were reviewed including but not limited to pain, bruising, bleeding, infection, weakness of muscles injected or those distal to injection. The patient voiced understanding of the risks and benefits. All questions answered and the patient consented to proceed.     Prior to receiving Botox injections the patient reported the following:  Baseline headache/migraine frequency: 20 days per month baseline.   Baseline headache/migraine duration: 18 hours  Baseline MIDAS score: 21  Associated migrainous features: photophobia, phonophobia, nausea    Previous treatment trials: Emgality, desvenlafaxine, topiramate, amitriptyline, buspirone, venlafaxine, propranolol     Last Botox procedure: 10/7/24 with Dr Linares  Current migraine frequency: after 2 weeks headache frequency goes down then starts to wear off after 2 months and builds. Down to 8 days a month at best stage.   Current migraine duration: 3-6 hours    Functional improvements since starting botulinum toxin treatments: able to be more effective at work, more productive, more social activities in the evenings.     Last Neurology office visit: 7/27/22    A 200 unit vial of Botox was diluted with 4ml of 0.9% sodium chloride    Botox lot # and expiration date: See MAR for details  0.9% sodium chloride lot # and expiration date: See MAR for details    The following muscles were injected using a 30 gauge needle:   and Procerus muscles - 5 units into the left and right corrugators and 5 units into the procerus (15 units total)     Frontalis muscles - 5 units into the left superior frontalis and 5 units into the right superior frontalis (2 injection sites per muscle) (10 units total)     Temporalis muscles - 25 units into  the left temporalis muscle and 25 units into the right temporalis muscle (3 injection sites per muscle) (50 units total)     Occipitalis muscles - 25 units into the left occipitalis muscle and 25 units into the right occipitalis muscle (3 injection sites per muscle) (50 units total)     Splenius Capitis muscles - 12.5 units into the left splenius capitis muscle and 12.5 units into the right splenius capitis muscle (2 injection sites per muscle, divided into 2/3 anteriorly and 1/3 posteriorly) (25 units total)     Trapezius muscles - 25 units into the left trapezius muscle and 25 units into the right trapezius muscle (3 injection sites per muscle, divided 5 units, 10 units, 10 units, medial to lateral) (50 units total)    The patient tolerated the injections well. I was present for and performed the entire procedure.     Trisha John MD

## 2025-01-15 ENCOUNTER — THERAPY VISIT (OUTPATIENT)
Dept: PHYSICAL THERAPY | Facility: CLINIC | Age: 40
End: 2025-01-15
Payer: COMMERCIAL

## 2025-01-15 ENCOUNTER — TELEPHONE (OUTPATIENT)
Dept: NEUROLOGY | Facility: CLINIC | Age: 40
End: 2025-01-15

## 2025-01-15 DIAGNOSIS — M25.552 HIP PAIN, LEFT: ICD-10-CM

## 2025-01-15 DIAGNOSIS — G89.29 CHRONIC BILATERAL LOW BACK PAIN WITHOUT SCIATICA: Primary | ICD-10-CM

## 2025-01-15 DIAGNOSIS — M54.50 CHRONIC BILATERAL LOW BACK PAIN WITHOUT SCIATICA: Primary | ICD-10-CM

## 2025-01-15 PROCEDURE — 97112 NEUROMUSCULAR REEDUCATION: CPT | Mod: GP

## 2025-01-15 PROCEDURE — 97140 MANUAL THERAPY 1/> REGIONS: CPT | Mod: GP

## 2025-01-15 NOTE — TELEPHONE ENCOUNTER
Left Voicemail (1st Attempt) and Sent Mychart (1st Attempt) for the patient to call back and schedule the following:    Appointment type: Return Headache  Provider: Vijay  Return date: next available  Specialty phone number: [phone]  Additional appointment(s) needed: NA  Additonal Notes: over due for clinic visit    Lindsey Wang on 1/15/2025 at 8:43 AM

## 2025-01-27 DIAGNOSIS — F41.9 ANXIETY: ICD-10-CM

## 2025-01-27 DIAGNOSIS — F32.9 REACTIVE DEPRESSION: ICD-10-CM

## 2025-01-27 RX ORDER — DESVENLAFAXINE 50 MG/1
50 TABLET, FILM COATED, EXTENDED RELEASE ORAL DAILY
Qty: 30 TABLET | Refills: 0 | Status: SHIPPED | OUTPATIENT
Start: 2025-01-27

## 2025-02-09 ASSESSMENT — HEADACHE IMPACT TEST (HIT 6)
WHEN YOU HAVE HEADACHES HOW OFTEN IS THE PAIN SEVERE: RARELY
HOW OFTEN DID HEADACHS LIMIT CONCENTRATION ON WORK OR DAILY ACTIVITY: RARELY
WHEN YOU HAVE A HEADACHE HOW OFTEN DO YOU WISH YOU COULD LIE DOWN: SOMETIMES
HIT6 TOTAL SCORE: 52
HOW OFTEN HAVE YOU FELT FED UP OR IRRITATED BECAUSE OF YOUR HEADACHES: RARELY
HOW OFTEN HAVE YOU FELT TOO TIRED TO WORK BECAUSE OF YOUR HEADACHES: RARELY
HOW OFTEN DO HEADACHES LIMIT YOUR DAILY ACTIVITIES: SOMETIMES

## 2025-02-09 ASSESSMENT — MIGRAINE DISABILITY ASSESSMENT (MIDAS)
HOW OFTEN WERE SOCIAL ACTIVITIES MISSED DUE TO HEADACHES: 4
HOW MANY DAYS WAS HOUSEWORK PRODUCTIVITY CUT IN HALF DUE TO HEADACHES: 8
HOW MANY DAYS DID YOU NOT DO HOUSEWORK BECAUSE OF HEADACHES: 0
HOW MANY DAYS IN THE PAST 3 MONTHS HAVE YOU HAD A HEADACHE: 20
HOW MANY DAYS DID YOU MISS WORK OR SCHOOL BECAUSE OF HEADACHES: 0
ON A SCALE FROM 0-10 ON AVERAGE HOW PAINFUL WERE HEADACHES: 3
TOTAL SCORE: 20
HOW MANY DAYS WAS YOUR PRODUCTIVITY CUT IN HALF BECAUSE OF HEADACHES: 8

## 2025-02-12 ENCOUNTER — VIRTUAL VISIT (OUTPATIENT)
Dept: NEUROLOGY | Facility: CLINIC | Age: 40
End: 2025-02-12
Payer: COMMERCIAL

## 2025-02-12 DIAGNOSIS — G43.709 CHRONIC MIGRAINE WITHOUT AURA WITHOUT STATUS MIGRAINOSUS, NOT INTRACTABLE: ICD-10-CM

## 2025-02-12 RX ORDER — ONDANSETRON 4 MG/1
4 TABLET, ORALLY DISINTEGRATING ORAL EVERY 8 HOURS PRN
Qty: 15 TABLET | Refills: 11 | Status: SHIPPED | OUTPATIENT
Start: 2025-02-12

## 2025-02-12 NOTE — PROGRESS NOTES
Virtual Visit Details    Type of service:  Video Visit     Originating Location (pt. Location): Home    Distant Location (provider location):  Off-site  Platform used for Video Visit: [facility]    Headache Neurology Progress Note  February 12, 2025      Assessment/Plan:   Eliezer Ellis is a 40 year old man who returns for follow-up of chronic migraine without aura.  Botox injections using a chronic migraine protocol remains effective, with over 50% reduction in headache and migraine frequency.     For acute treatment of headache, I recommend the following:  -For acute min of mild headache, he will continue acetaminophen or ibuprofen as needed, not to exceed more than 14 days/month to avoid medication overuse.  -For acute treatment of moderate to severe headache, he will continue Nurtec 75 mg ODT daily PRN migraine.  -For nausea related to headache, I recommend ondansetron 4 mg ODT as needed, not to exceed more than 9 days/month to avoid medication side effects.     His headache frequency and severity warrants prevention.   -I recommend continuing botulinum toxin injections using a chronic migraine protocol every 12 weeks.    -Previously tried amitriptyline, propranolol, topiramate, Emgality, venlafaxine, desvenlafaxine, buspirone.    The longitudinal plan of care for Eliezer was addressed during this visit. Due to the added complexity in care, I will continue to support Eliezer in the subsequent management of this condition(s) and with the ongoing continuity of care of this condition(s).      Sol Linares MD  Neurology     Subjective:    Eliezer Ellis returns for follow up of chronic migraine.    Prior to initiation of botulinum toxin injections, Mr. Ellis reported 20 headache days per month, with 20 severe headache days per month. His headaches are quite disabling and often interfere with his ability to function normally.     Today, he reports a clear improvement with Botox. For  the first couple of months after the injections, headaches are very rare. Headaches start coming back in the last month. A higher dose of Botox was a little better.     He reports 4 headache days per month when Botox is working. When worn off, goes back to 3-4 days per week. Is not missing work or events when Botox is active.    No change in quality of headache. Still gets nausea and sensory sensitivities with migraine attacks.     He takes Tylenol, ibuprofen, and Nurtec as needed. Remain effective. No side effects.    Taking ginger chews for nausea. Would be interested in trying a medication.     He has attempted other migraine prophylactic treatments in the past, which have included: Emgality, desvenlafaxine, topiramate, amitriptyline, buspirone, venlafaxine, propranolol.       He currently takes desvenlafaxine for other medical reasons.    He started a compounded semaglutide. No change in headache with this; did have some nausea when starting.     Objective:    Vitals: There were no vitals taken for this visit.  General: Cooperative, NAD  Neurologic:  Mental Status: Fully alert, attentive and oriented. Speech clear and fluent.   Cranial Nerves: Facial movements symmetric.   Motor: No abnormal movements.      Pertinent Investigations:          10/3/2024     3:28 PM 1/11/2025    11:35 AM 2/9/2025     6:28 PM   HIT-6   When you have headaches, how often is the pain severe 8 8 8   How often do headaches limit your ability to do usual daily activities including household work, work, school, or social activities? 10 10 10   When you have a headache, how often do you wish you could lie down? 10 10 10   In the past 4 weeks, how often have you felt too tired to do work or daily activities because of your headaches 8 6 8   In the past 4 weeks, how often have you felt fed up or irritated because of your headaches 10 10 8   In the past 4 weeks, how often did headaches limit your ability to concentrate on work or daily  activities 10 10 8   HIT-6 Total Score 56 54  52        Patient-reported           7/27/2022    10:50 AM 2/9/2025     6:31 PM   MIDAS - in the past three months:   On how many days did you miss work or school because of your headaches? 0 0   How many days was your productivity at work or school reduced by half or more because of your headaches? 6 8   On how many days did you not do household work because of your headaches? 0 0   How many days was your productivity in household work reduced by half or more because of your headaches? 12 8   On how many days did you miss family, social, or leisure activities because of your headaches? 3 4   On how many days did you have a headache? 60 20   On a scale of 0-10, on average how painful were these headaches? 3 3   MIDAS Score 21 (IV - Severe Disability) 20 (III - Moderate Disability)

## 2025-02-12 NOTE — LETTER
2/12/2025       RE: Eliezer Ellis  [address]  [facility] [address]     Dear Colleague,    Thank you for referring your patient, Eliezer Ellis, to the [facility]. Please see a copy of my visit note below.    Virtual Visit Details    Type of service:  Video Visit     Originating Location (pt. Location): Home    Distant Location (provider location):  Off-site  Platform used for Video Visit: [email or web address]    [facility]    Headache Neurology Progress Note  February 12, 2025      Assessment/Plan:   Eliezer Ellis is a 40 year old man who returns for follow-up of chronic migraine without aura.  Botox injections using a chronic migraine protocol remains effective, with over 50% reduction in headache and migraine frequency.     For acute treatment of headache, I recommend the following:  -For acute min of mild headache, he will continue acetaminophen or ibuprofen as needed, not to exceed more than 14 days/month to avoid medication overuse.  -For acute treatment of moderate to severe headache, he will continue Nurtec 75 mg ODT daily PRN migraine.  -For nausea related to headache, I recommend ondansetron 4 mg ODT as needed, not to exceed more than 9 days/month to avoid medication side effects.     His headache frequency and severity warrants prevention.   -I recommend continuing botulinum toxin injections using a chronic migraine protocol every 12 weeks.    -Previously tried amitriptyline, propranolol, topiramate, Emgality, venlafaxine, desvenlafaxine, buspirone.    The longitudinal plan of care for Eliezer was addressed during this visit. Due to the added complexity in care, I will continue to support Eliezer in the subsequent management of this condition(s) and with the ongoing continuity of care of this condition(s).      Sol Linares MD  Neurology     Subjective:    Eliezer Ellis returns for follow  up of chronic migraine.    Prior to initiation of botulinum toxin injections, Mr. Ellis reported 20 headache days per month, with 20 severe headache days per month. His headaches are quite disabling and often interfere with his ability to function normally.     Today, he reports a clear improvement with Botox. For the first couple of months after the injections, headaches are very rare. Headaches start coming back in the last month. A higher dose of Botox was a little better.     He reports 4 headache days per month when Botox is working. When worn off, goes back to 3-4 days per week. Is not missing work or events when Botox is active.    No change in quality of headache. Still gets nausea and sensory sensitivities with migraine attacks.     He takes Tylenol, ibuprofen, and Nurtec as needed. Remain effective. No side effects.    Taking ginger chews for nausea. Would be interested in trying a medication.     He has attempted other migraine prophylactic treatments in the past, which have included: Emgality, desvenlafaxine, topiramate, amitriptyline, buspirone, venlafaxine, propranolol.       He currently takes desvenlafaxine for other medical reasons.    He started a compounded semaglutide. No change in headache with this; did have some nausea when starting.     Objective:    Vitals: There were no vitals taken for this visit.  General: Cooperative, NAD  Neurologic:  Mental Status: Fully alert, attentive and oriented. Speech clear and fluent.   Cranial Nerves: Facial movements symmetric.   Motor: No abnormal movements.      Pertinent Investigations:          10/3/2024     3:28 PM 1/11/2025    11:35 AM 2/9/2025     6:28 PM   HIT-6   When you have headaches, how often is the pain severe 8 8 8   How often do headaches limit your ability to do usual daily activities including household work, work, school, or social activities? 10 10 10   When you have a headache, how often do you wish you could lie down? 10 10 10   In the  past 4 weeks, how often have you felt too tired to do work or daily activities because of your headaches 8 6 8   In the past 4 weeks, how often have you felt fed up or irritated because of your headaches 10 10 8   In the past 4 weeks, how often did headaches limit your ability to concentrate on work or daily activities 10 10 8   HIT-6 Total Score 56 54  52        Patient-reported           7/27/2022    10:50 AM 2/9/2025     6:31 PM   MIDAS - in the past three months:   On how many days did you miss work or school because of your headaches? 0 0   How many days was your productivity at work or school reduced by half or more because of your headaches? 6 8   On how many days did you not do household work because of your headaches? 0 0   How many days was your productivity in household work reduced by half or more because of your headaches? 12 8   On how many days did you miss family, social, or leisure activities because of your headaches? 3 4   On how many days did you have a headache? 60 20   On a scale of 0-10, on average how painful were these headaches? 3 3   MIDAS Score 21 (IV - Severe Disability) 20 (III - Moderate Disability)          Again, thank you for allowing me to participate in the care of your patient.      Sincerely,    Sol Linares MD

## 2025-02-12 NOTE — NURSING NOTE
Current patient location: [address] [facility] [address]    Is the patient currently in the state of [place]? YES    Visit mode: VIDEO    If the visit is dropped, the patient can be reconnected by:TELEPHONE VISIT: Phone number:   Telephone Information:   Mobile [phone]       Will anyone else be joining the visit? NO  (If patient encounters technical issues they should call [phone]    Are changes needed to the allergy or medication list?  no    Are refills needed on medications prescribed by this physician? NO    Rooming Documentation:  Questionnaire(s) completed    Reason for visit: Headache    Lisa GUERREROF

## 2025-02-22 DIAGNOSIS — F32.9 REACTIVE DEPRESSION: ICD-10-CM

## 2025-02-22 DIAGNOSIS — F41.9 ANXIETY: ICD-10-CM

## 2025-02-23 DIAGNOSIS — E66.01 MORBID OBESITY (H): ICD-10-CM

## 2025-02-24 RX ORDER — DESVENLAFAXINE 50 MG/1
TABLET, FILM COATED, EXTENDED RELEASE ORAL
Qty: 30 TABLET | Refills: 0 | Status: SHIPPED | OUTPATIENT
Start: 2025-02-24

## 2025-02-26 DIAGNOSIS — M1A.4720 CHRONIC GOUT DUE TO OTHER SECONDARY CAUSE INVOLVING TOE OF LEFT FOOT WITHOUT TOPHUS: ICD-10-CM

## 2025-02-27 RX ORDER — ALLOPURINOL 100 MG/1
100 TABLET ORAL DAILY
Qty: 90 TABLET | Refills: 0 | Status: SHIPPED | OUTPATIENT
Start: 2025-02-27

## 2025-03-03 ENCOUNTER — TELEPHONE (OUTPATIENT)
Dept: ENDOCRINOLOGY | Facility: CLINIC | Age: 40
End: 2025-03-03
Payer: COMMERCIAL

## 2025-03-03 NOTE — TELEPHONE ENCOUNTER
Left Voicemail (1st Attempt) and Sent Mychart (1st Attempt) for the patient to call back and schedule the following:    Appointment type: RET [facility]  Provider: Qiana or MARY  Return date: first available  Specialty phone number: [phone]  Additional appointment(s) needed: n/a  Additonal Notes: n/a

## 2025-03-08 SDOH — HEALTH STABILITY: PHYSICAL HEALTH: ON AVERAGE, HOW MANY MINUTES DO YOU ENGAGE IN EXERCISE AT THIS LEVEL?: 30 MIN

## 2025-03-08 SDOH — HEALTH STABILITY: PHYSICAL HEALTH: ON AVERAGE, HOW MANY DAYS PER WEEK DO YOU ENGAGE IN MODERATE TO STRENUOUS EXERCISE (LIKE A BRISK WALK)?: 2 DAYS

## 2025-03-08 ASSESSMENT — SOCIAL DETERMINANTS OF HEALTH (SDOH): HOW OFTEN DO YOU GET TOGETHER WITH FRIENDS OR RELATIVES?: ONCE A WEEK

## 2025-03-11 ENCOUNTER — OFFICE VISIT (OUTPATIENT)
Dept: FAMILY MEDICINE | Facility: CLINIC | Age: 40
End: 2025-03-11
Attending: FAMILY MEDICINE
Payer: COMMERCIAL

## 2025-03-11 ENCOUNTER — MYC MEDICAL ADVICE (OUTPATIENT)
Dept: FAMILY MEDICINE | Facility: CLINIC | Age: 40
End: 2025-03-11

## 2025-03-11 VITALS
RESPIRATION RATE: 19 BRPM | SYSTOLIC BLOOD PRESSURE: 124 MMHG | DIASTOLIC BLOOD PRESSURE: 72 MMHG | HEART RATE: 102 BPM | OXYGEN SATURATION: 97 % | TEMPERATURE: 97 F

## 2025-03-11 DIAGNOSIS — E78.5 HYPERLIPIDEMIA LDL GOAL <100: ICD-10-CM

## 2025-03-11 DIAGNOSIS — Z13.1 SCREENING FOR DIABETES MELLITUS: ICD-10-CM

## 2025-03-11 DIAGNOSIS — G43.709 CHRONIC MIGRAINE WITHOUT AURA WITHOUT STATUS MIGRAINOSUS, NOT INTRACTABLE: ICD-10-CM

## 2025-03-11 DIAGNOSIS — M1A.4720 CHRONIC GOUT DUE TO OTHER SECONDARY CAUSE INVOLVING TOE OF LEFT FOOT WITHOUT TOPHUS: ICD-10-CM

## 2025-03-11 DIAGNOSIS — F32.9 REACTIVE DEPRESSION: ICD-10-CM

## 2025-03-11 DIAGNOSIS — E66.01 MORBID OBESITY (H): ICD-10-CM

## 2025-03-11 DIAGNOSIS — Z87.19 HISTORY OF CANKER SORES: ICD-10-CM

## 2025-03-11 DIAGNOSIS — Z00.00 ROUTINE GENERAL MEDICAL EXAMINATION AT A HEALTH CARE FACILITY: Primary | ICD-10-CM

## 2025-03-11 DIAGNOSIS — E06.3 HYPOTHYROIDISM DUE TO HASHIMOTO'S THYROIDITIS: ICD-10-CM

## 2025-03-11 DIAGNOSIS — F41.9 ANXIETY: ICD-10-CM

## 2025-03-11 DIAGNOSIS — J45.20 ASTHMA, WELL CONTROLLED, MILD INTERMITTENT: ICD-10-CM

## 2025-03-11 DIAGNOSIS — K21.00 GASTROESOPHAGEAL REFLUX DISEASE WITH ESOPHAGITIS, UNSPECIFIED WHETHER HEMORRHAGE: ICD-10-CM

## 2025-03-11 PROBLEM — F33.1 MODERATE EPISODE OF RECURRENT MAJOR DEPRESSIVE DISORDER (H): Status: RESOLVED | Noted: 2019-09-25 | Resolved: 2025-03-11

## 2025-03-11 LAB
EST. AVERAGE GLUCOSE BLD GHB EST-MCNC: 88 MG/DL
HBA1C MFR BLD: 4.7 % (ref 0–5.6)

## 2025-03-11 PROCEDURE — 36415 COLL VENOUS BLD VENIPUNCTURE: CPT | Performed by: FAMILY MEDICINE

## 2025-03-11 PROCEDURE — 3078F DIAST BP <80 MM HG: CPT | Performed by: FAMILY MEDICINE

## 2025-03-11 PROCEDURE — 99396 PREV VISIT EST AGE 40-64: CPT | Mod: 25 | Performed by: FAMILY MEDICINE

## 2025-03-11 PROCEDURE — 90677 PCV20 VACCINE IM: CPT | Performed by: FAMILY MEDICINE

## 2025-03-11 PROCEDURE — 99214 OFFICE O/P EST MOD 30 MIN: CPT | Mod: 25 | Performed by: FAMILY MEDICINE

## 2025-03-11 PROCEDURE — 80061 LIPID PANEL: CPT | Performed by: FAMILY MEDICINE

## 2025-03-11 PROCEDURE — 1126F AMNT PAIN NOTED NONE PRSNT: CPT | Performed by: FAMILY MEDICINE

## 2025-03-11 PROCEDURE — 84550 ASSAY OF BLOOD/URIC ACID: CPT | Performed by: FAMILY MEDICINE

## 2025-03-11 PROCEDURE — 90471 IMMUNIZATION ADMIN: CPT | Performed by: FAMILY MEDICINE

## 2025-03-11 PROCEDURE — 83036 HEMOGLOBIN GLYCOSYLATED A1C: CPT | Performed by: FAMILY MEDICINE

## 2025-03-11 PROCEDURE — 90472 IMMUNIZATION ADMIN EACH ADD: CPT | Performed by: FAMILY MEDICINE

## 2025-03-11 PROCEDURE — 90746 HEPB VACCINE 3 DOSE ADULT IM: CPT | Performed by: FAMILY MEDICINE

## 2025-03-11 PROCEDURE — 84443 ASSAY THYROID STIM HORMONE: CPT | Performed by: FAMILY MEDICINE

## 2025-03-11 PROCEDURE — 3074F SYST BP LT 130 MM HG: CPT | Performed by: FAMILY MEDICINE

## 2025-03-11 RX ORDER — FLUOCINONIDE GEL 0.5 MG/G
GEL TOPICAL 2 TIMES DAILY
Qty: 30 G | Refills: 0 | Status: SHIPPED | OUTPATIENT
Start: 2025-03-11

## 2025-03-11 RX ORDER — LEVOTHYROXINE SODIUM 75 UG/1
75 TABLET ORAL DAILY
Qty: 90 TABLET | Refills: 3 | Status: SHIPPED | OUTPATIENT
Start: 2025-03-11

## 2025-03-11 RX ORDER — ALLOPURINOL 100 MG/1
100 TABLET ORAL DAILY
Qty: 90 TABLET | Refills: 3 | Status: SHIPPED | OUTPATIENT
Start: 2025-03-11

## 2025-03-11 RX ORDER — ALBUTEROL SULFATE 90 UG/1
1-2 INHALANT RESPIRATORY (INHALATION) EVERY 6 HOURS PRN
Qty: 8 G | Refills: 11 | Status: SHIPPED | OUTPATIENT
Start: 2025-03-11

## 2025-03-11 RX ORDER — DESVENLAFAXINE 50 MG/1
50 TABLET, FILM COATED, EXTENDED RELEASE ORAL DAILY
Qty: 90 TABLET | Refills: 3 | Status: SHIPPED | OUTPATIENT
Start: 2025-03-11

## 2025-03-11 ASSESSMENT — PATIENT HEALTH QUESTIONNAIRE - PHQ9
SUM OF ALL RESPONSES TO PHQ QUESTIONS 1-9: 2
SUM OF ALL RESPONSES TO PHQ QUESTIONS 1-9: 2
10. IF YOU CHECKED OFF ANY PROBLEMS, HOW DIFFICULT HAVE THESE PROBLEMS MADE IT FOR YOU TO DO YOUR WORK, TAKE CARE OF THINGS AT HOME, OR GET ALONG WITH OTHER PEOPLE: NOT DIFFICULT AT ALL

## 2025-03-11 ASSESSMENT — ANXIETY QUESTIONNAIRES
GAD7 TOTAL SCORE: 3
7. FEELING AFRAID AS IF SOMETHING AWFUL MIGHT HAPPEN: NOT AT ALL
6. BECOMING EASILY ANNOYED OR IRRITABLE: NOT AT ALL
3. WORRYING TOO MUCH ABOUT DIFFERENT THINGS: SEVERAL DAYS
4. TROUBLE RELAXING: SEVERAL DAYS
8. IF YOU CHECKED OFF ANY PROBLEMS, HOW DIFFICULT HAVE THESE MADE IT FOR YOU TO DO YOUR WORK, TAKE CARE OF THINGS AT HOME, OR GET ALONG WITH OTHER PEOPLE?: SOMEWHAT DIFFICULT
7. FEELING AFRAID AS IF SOMETHING AWFUL MIGHT HAPPEN: NOT AT ALL
GAD7 TOTAL SCORE: 3
2. NOT BEING ABLE TO STOP OR CONTROL WORRYING: NOT AT ALL
GAD7 TOTAL SCORE: 3
1. FEELING NERVOUS, ANXIOUS, OR ON EDGE: SEVERAL DAYS
IF YOU CHECKED OFF ANY PROBLEMS ON THIS QUESTIONNAIRE, HOW DIFFICULT HAVE THESE PROBLEMS MADE IT FOR YOU TO DO YOUR WORK, TAKE CARE OF THINGS AT HOME, OR GET ALONG WITH OTHER PEOPLE: SOMEWHAT DIFFICULT
5. BEING SO RESTLESS THAT IT IS HARD TO SIT STILL: NOT AT ALL

## 2025-03-11 ASSESSMENT — PAIN SCALES - GENERAL: PAINLEVEL_OUTOF10: NO PAIN (0)

## 2025-03-11 NOTE — NURSING NOTE
Prior to immunization administration, verified patients identity using patient s name and date of birth. Please see Immunization Activity for additional information.     Screening Questionnaire for Adult Immunization    Are you sick today?   No   Do you have allergies to medications, food, a vaccine component or latex?   No   Have you ever had a serious reaction after receiving a vaccination?   No   Do you have a long-term health problem with heart, lung, kidney, or metabolic disease (e.g., diabetes), asthma, a blood disorder, no spleen, complement component deficiency, a cochlear implant, or a spinal fluid leak?  Are you on long-term aspirin therapy?   No   Do you have cancer, leukemia, HIV/AIDS, or any other immune system problem?   No   Do you have a parent, brother, or sister with an immune system problem?   No   In the past 3 months, have you taken medications that affect  your immune system, such as prednisone, other steroids, or anticancer drugs; drugs for the treatment of rheumatoid arthritis, Crohn s disease, or psoriasis; or have you had radiation treatments?   No   Have you had a seizure, or a brain or other nervous system problem?   No   During the past year, have you received a transfusion of blood or blood    products, or been given immune (gamma) globulin or antiviral drug?   No   For women: Are you pregnant or is there a chance you could become       pregnant during the next month?   No   Have you received any vaccinations in the past 4 weeks?   No     Immunization questionnaire answers were all negative.      Patient instructed to remain in clinic for 15 minutes afterwards, and to report any adverse reactions.     Screening performed by Romaine Brown MA on 3/11/2025 at 12:47 PM.

## 2025-03-11 NOTE — LETTER
My Asthma Action Plan    Name: Eliezer Ellis   YOB: 1985  Date: 3/11/2025   My doctor: Ursula Huang MD   My clinic: [facility]        My Rescue Medicine:   Albuterol inhaler (Proair/Ventolin/Proventil HFA)  2-4 puffs EVERY 4 HOURS as needed. Use a spacer if recommended by your provider.   My Asthma Severity:   Intermittent / Exercise Induced  Know your asthma triggers: upper respiratory infections, exercise or sports, and emotions             GREEN ZONE   Good Control  I feel good  No cough or wheeze  Can work, sleep and play without asthma symptoms       Take your asthma control medicine every day.     If exercise triggers your asthma, take your rescue medication  15 minutes before exercise or sports, and  During exercise if you have asthma symptoms  Spacer to use with inhaler: If you have a spacer, make sure to use it with your inhaler             YELLOW ZONE Getting Worse  I have ANY of these:  I do not feel good  Cough or wheeze  Chest feels tight  Wake up at night   Keep taking your Green Zone medications  Start taking your rescue medicine:  every 20 minutes for up to 1 hour. Then every 4 hours for 24-48 hours.  If you stay in the Yellow Zone for more than 12-24 hours, contact your doctor.  If you do not return to the Green Zone in 12-24 hours or you get worse, start taking your oral steroid medicine if prescribed by your provider.           RED ZONE Medical Alert - Get Help  I have ANY of these:  I feel awful  Medicine is not helping  Breathing getting harder  Trouble walking or talking  Nose opens wide to breathe       Take your rescue medicine NOW  If your provider has prescribed an oral steroid medicine, start taking it NOW  Call your doctor NOW  If you are still in the Red Zone after 20 minutes and you have not reached your doctor:  Take your rescue medicine again and  Call 911 or go to the emergency room right away    See your regular doctor within 2 weeks of an  Emergency Room or Urgent Care visit for follow-up treatment.          Annual Reminders:  Meet with Asthma Educator,  Flu Shot in the Fall, consider Pneumonia Vaccination for patients with asthma (aged 19 and older).    Pharmacy:    [id number] - [place], [place] - [address] AT [facility] [address]  [facility] - [place], [place] - [address]  (INACTIVE SEE NCPDP [id number]) [facility] - [place], [place] - [address]  [facility] - [place], [place] - [address]    Electronically signed by Ursula Huang MD   Date: 03/11/25                    Asthma Triggers  How To Control Things That Make Your Asthma Worse    Triggers are things that make your asthma worse.  Look at the list below to help you find your triggers and   what you can do about them. You can help prevent asthma flare-ups by staying away from your triggers.      Trigger                                                          What you can do   Cigarette Smoke  Tobacco smoke can make asthma worse. Do not allow smoking in your home, car or around you.  Be sure no one smokes at a child s day care or school.  If you smoke, ask your health care provider for ways to help you quit.  Ask family members to quit too.  Ask your health care provider for a referral to Quit Plan to help you quit smoking, or call 2-936-208-PLAN.     Colds, Flu, Bronchitis  These are common triggers of asthma. Wash your hands often.  Don t touch your eyes, nose or mouth.  Get a flu shot every year.     Dust Mites  These are tiny bugs that live in cloth or carpet. They are too small to see. Wash sheets and blankets in hot water every week.   Encase pillows and mattress in dust mite proof covers.  Avoid having carpet if you can. If you have carpet, vacuum weekly.   Use a dust mask and HEPA vacuum.   Pollen and Outdoor Mold  Some people are allergic to trees, grass, or  weed pollen, or molds. Try to keep your windows closed.  Limit time out doors when pollen count is high.   Ask you health care provider about taking medicine during allergy season.     Animal Dander  Some people are allergic to skin flakes, urine or saliva from pets with fur or feathers. Keep pets with fur or feathers out of your home.    If you can t keep the pet outdoors, then keep the pet out of your bedroom.  Keep the bedroom door closed.  Keep pets off cloth furniture and away from stuffed toys.     Mice, Rats, and Cockroaches  Some people are allergic to the waste from these pests.   Cover food and garbage.  Clean up spills and food crumbs.  Store grease in the refrigerator.   Keep food out of the bedroom.   Indoor Mold  This can be a trigger if your home has high moisture. Fix leaking faucets, pipes, or other sources of water.   Clean moldy surfaces.  Dehumidify basement if it is damp and smelly.   Smoke, Strong Odors, and Sprays  These can reduce air quality. Stay away from strong odors and sprays, such as perfume, powder, hair spray, paints, smoke incense, paint, cleaning products, candles and new carpet.   Exercise or Sports  Some people with asthma have this trigger. Be active!  Ask your doctor about taking medicine before sports or exercise to prevent symptoms.    Warm up for 5-10 minutes before and after sports or exercise.     Other Triggers of Asthma  Cold air:  Cover your nose and mouth with a scarf.  Sometimes laughing or crying can be a trigger.  Some medicines and food can trigger asthma.

## 2025-03-11 NOTE — PATIENT INSTRUCTIONS
Patient Education   Preventive Care Advice   This is general advice given by our system to help you stay healthy. However, your care team may have specific advice just for you. Please talk to your care team about your preventive care needs.  Nutrition  Eat 5 or more servings of fruits and vegetables each day.  Try wheat bread, brown rice and whole grain pasta (instead of white bread, rice, and pasta).  Get enough calcium and vitamin D. Check the label on foods and aim for 100% of the RDA (recommended daily allowance).  Lifestyle  Exercise at least 150 minutes each week  (30 minutes a day, 5 days a week).  Do muscle strengthening activities 2 days a week. These help control your weight and prevent disease.  No smoking.  Wear sunscreen to prevent skin cancer.  Have a dental exam and cleaning every 6 months.  Yearly exams  See your health care team every year to talk about:  Any changes in your health.  Any medicines your care team has prescribed.  Preventive care, family planning, and ways to prevent chronic diseases.  Shots (vaccines)   HPV shots (up to age 26), if you've never had them before.  Hepatitis B shots (up to age 59), if you've never had them before.  COVID-19 shot: Get this shot when it's due.  Flu shot: Get a flu shot every year.  Tetanus shot: Get a tetanus shot every 10 years.  Pneumococcal, hepatitis A, and RSV shots: Ask your care team if you need these based on your risk.  Shingles shot (for age 50 and up)  General health tests  Diabetes screening:  Starting at age 35, Get screened for diabetes at least every 3 years.  If you are younger than age 35, ask your care team if you should be screened for diabetes.  Cholesterol test: At age 39, start having a cholesterol test every 5 years, or more often if advised.  Bone density scan (DEXA): At age 50, ask your care team if you should have this scan for osteoporosis (brittle bones).  Hepatitis C: Get tested at least once in your life.  STIs (sexually  transmitted infections)  Before age 24: Ask your care team if you should be screened for STIs.  After age 24: Get screened for STIs if you're at risk. You are at risk for STIs (including HIV) if:  You are sexually active with more than one person.  You don't use condoms every time.  You or a partner was diagnosed with a sexually transmitted infection.  If you are at risk for HIV, ask about PrEP medicine to prevent HIV.  Get tested for HIV at least once in your life, whether you are at risk for HIV or not.  Cancer screening tests  Cervical cancer screening: If you have a cervix, begin getting regular cervical cancer screening tests starting at age 21.  Breast cancer scan (mammogram): If you've ever had breasts, begin having regular mammograms starting at age 40. This is a scan to check for breast cancer.  Colon cancer screening: It is important to start screening for colon cancer at age 45.  Have a colonoscopy test every 10 years (or more often if you're at risk) Or, ask your provider about stool tests like a FIT test every year or Cologuard test every 3 years.  To learn more about your testing options, visit:   .  For help making a decision, visit:   https://bit.ly/yx78365.  Prostate cancer screening test: If you have a prostate, ask your care team if a prostate cancer screening test (PSA) at age 55 is right for you.  Lung cancer screening: If you are a current or former smoker ages 50 to 80, ask your care team if ongoing lung cancer screenings are right for you.  For informational purposes only. Not to replace the advice of your health care provider. Copyright   2023 [facility]. All rights reserved. Clinically reviewed by the [facility]. [place] 358619 - REV 01/24.  Learning About Stress  What is stress?     Stress is your body's response to a hard situation. Your body can have a physical, emotional, or mental response. Stress is a fact of life for most people, and it  affects everyone differently. What causes stress for you may not be stressful for someone else.  A lot of things can cause stress. You may feel stress when you go on a job interview, take a test, or run a race. This kind of short-term stress is normal and even useful. It can help you if you need to work hard or react quickly. For example, stress can help you finish an important job on time.  Long-term stress is caused by ongoing stressful situations or events. Examples of long-term stress include long-term health problems, ongoing problems at work, or conflicts in your family. Long-term stress can harm your health.  How does stress affect your health?  When you are stressed, your body responds as though you are in danger. It makes hormones that speed up your heart, make you breathe faster, and give you a burst of energy. This is called the fight-or-flight stress response. If the stress is over quickly, your body goes back to normal and no harm is done.  But if stress happens too often or lasts too long, it can have bad effects. Long-term stress can make you more likely to get sick, and it can make symptoms of some diseases worse. If you tense up when you are stressed, you may develop neck, shoulder, or low back pain. Stress is linked to high blood pressure and heart disease.  Stress also harms your emotional health. It can make you alvarez, tense, or depressed. Your relationships may suffer, and you may not do well at work or school.  What can you do to manage stress?  You can try these things to help manage stress:   Do something active. Exercise or activity can help reduce stress. Walking is a great way to get started. Even everyday activities such as housecleaning or yard work can help.  Try yoga or ann chi. These techniques combine exercise and meditation. You may need some training at first to learn them.  Do something you enjoy. For example, listen to music or go to a movie. Practice your hobby or do volunteer  "work.  Meditate. This can help you relax, because you are not worrying about what happened before or what may happen in the future.  Do guided imagery. Imagine yourself in any setting that helps you feel calm. You can use online videos, books, or a teacher to guide you.  Do breathing exercises. For example:  From a standing position, bend forward from the waist with your knees slightly bent. Let your arms dangle close to the floor.  Breathe in slowly and deeply as you return to a standing position. Roll up slowly and lift your head last.  Hold your breath for just a few seconds in the standing position.  Breathe out slowly and bend forward from the waist.  Let your feelings out. Talk, laugh, cry, and express anger when you need to. Talking with supportive friends or family, a counselor, or a janis leader about your feelings is a healthy way to relieve stress. Avoid discussing your feelings with people who make you feel worse.  Write. It may help to write about things that are bothering you. This helps you find out how much stress you feel and what is causing it. When you know this, you can find better ways to cope.  What can you do to prevent stress?  You might try some of these things to help prevent stress:  Manage your time. This helps you find time to do the things you want and need to do.  Get enough sleep. Your body recovers from the stresses of the day while you are sleeping.  Get support. Your family, friends, and community can make a difference in how you experience stress.  Limit your news feed. Avoid or limit time on social media or news that may make you feel stressed.  Do something active. Exercise or activity can help reduce stress. Walking is a great way to get started.  Where can you learn more?  Go to https://www.[employer].net/patiented  Enter N032 in the search box to learn more about \"Learning About Stress.\"  Current as of: October 24, 2023  Content Version: 14.3    2024 [employer]. "   Care instructions adapted under license by your healthcare professional. If you have questions about a medical condition or this instruction, always ask your healthcare professional. [employer] disclaims any warranty or liability for your use of this information.

## 2025-03-11 NOTE — PROGRESS NOTES
Answers submitted by the patient for this visit:  Patient Health Questionnaire (Submitted on 3/11/2025)  If you checked off any problems, how difficult have these problems made it for you to do your work, take care of things at home, or get along with other people?: Not difficult at all  PHQ9 TOTAL SCORE: 2  Patient Health Questionnaire (G7) (Submitted on 3/11/2025)  RICHARD 7 TOTAL SCORE: 3  Preventive Care Visit  [facility]  Ursula Huang MD, Family Medicine  Mar 11, 2025      Assessment & Plan     1. Routine general medical examination at a health care facility (Primary)  - Pneumococcal 20 Valent Conjugate (Prevnar 20)  - HEPATITIS B, ADULT 20+ (ENGERIX-B/RECOMBIVAX HB)    2. 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 Bili, TP); Future  - allopurinol (ZYLOPRIM) 100 MG tablet; Take 1 tablet (100 mg) by mouth daily.  Dispense: 90 tablet; Refill: 3    3. Hypothyroidism due to Hashimoto's thyroiditis  Plan: no symptoms changes, over all stable, takes medications empty stomch. Need refill for 1 yr  - TSH WITH FREE T4 REFLEX; Future  - levothyroxine (SYNTHROID/LEVOTHROID) 75 MCG tablet; Take 1 tablet (75 mcg) by mouth daily.  Dispense: 90 tablet; Refill: 3    4. Hyperlipidemia LDL goal <100  Plan:-intentional wt loss of > 35 lbs on zpabound  Not fasting today  Reiterated benefit of excercise  Reiterated need for cholesterol lowering medications due to very high LDL- that over the years has improved and not at target.  If LDL still high-than CT scan for Calcium score and if that is high as well, than willing to take the medications     Lipid panel reflex to direct LDL Non-fasting; Future  - CT Coronary Calcium Scan; Future    Recent Labs   Lab Test 01/21/24  0954 06/13/22  1105   CHOL 277* 263*    HDL 33* 33*   * 178*   TRIG 229* 260*      5. Reactive depression  Plan:- desvenlafaxine (PRISTIQ) 50 MG 24 hr tablet; Take 1 tablet (50 mg) by mouth daily.  Dispense: 90 tablet; Refill: 3  Was seeing andrew pierre- would like refill  If needs xanax- will make a VV   reviewed- no concerns- last filled 11/2024- 30 tabs        1/16/2024     2:49 PM 10/16/2024    11:38 AM 3/11/2025    11:14 AM   PHQ   PHQ-9 Total Score 3 2 2    Q9: Thoughts of better off dead/self-harm past 2 weeks Not at all Not at all Not at all       Patient-reported      6. Anxiety  Plan: reports stable symptoms and refill given for the year  - desvenlafaxine (PRISTIQ) 50 MG 24 hr tablet; Take 1 tablet (50 mg) by mouth daily.  Dispense: 90 tablet; Refill: 3      11/14/2022    11:16 AM 1/16/2024     2:49 PM 3/11/2025    11:15 AM   RICHARD-7 SCORE   Total Score   3 (minimal anxiety)   Total Score 4 3 3        Patient-reported          7. Screening for diabetes mellitus  - Hemoglobin A1c; Future    8. Asthma, well controlled, mild intermittent  - albuterol (PROAIR HFA/PROVENTIL HFA/VENTOLIN HFA) 108 (90 Base) MCG/ACT inhaler; Inhale 1-2 puffs into the lungs every 6 hours as needed for shortness of breath, wheezing or cough. Patient to pick at own discretion  Dispense: 8 g; Refill: 11    9. History of canker sores  - fluocinonide (LIDEX) 0.05 % external gel; Apply topically 2 times daily.  Dispense: 30 g; Refill: 0    Obesity BM1 32  Plan: continue to work closely with MTM  Consistent wt loss on GLP  No food noise  Used to be hungry al the time.  Significant improved mental aspect  Wt Readings from Last 5 Encounters:   02/27/25 108 kg (238 lb)   11/15/24 117.9 kg (260 lb)   10/17/24 121 kg (266 lb 12.8 oz)   06/26/24 122.5 kg (270 lb)   06/25/24 122.5 kg (270 lb)        We briefly discuss mild gastroesophageal reflux disease  He has managed it with over the counter once daily famotidine    He has chronic migraine  Managed on botox/  neurology    Patient has been advised of split billing requirements and indicates understanding: Yes        BMI  Estimated body mass index is 32.28 kg/m  as calculated from the following:    Height as of 2/27/25: 1.829 m (6').    Weight as of 2/27/25: 108 kg (238 lb).   Weight management plan: Discussed healthy diet and exercise guidelines    Counseling  Appropriate preventive services were addressed with this patient via screening, questionnaire, or discussion as appropriate for fall prevention, nutrition, physical activity, Tobacco-use cessation, social engagement, weight loss and cognition.  Checklist reviewing preventive services available has been given to the patient.  Reviewed patient's diet, addressing concerns and/or questions.   He is at risk for lack of exercise and has been provided with information to increase physical activity for the benefit of his well-being.   He is at risk for psychosocial distress and has been provided with information to reduce risk.       Work on weight loss    Subjective   Eliezer is a 40 year old, presenting for the following:  Physical        3/11/2025    11:30 AM   Additional Questions   Roomed by kofi campbell   Accompanied by haim         3/11/2025    11:30 AM   Patient Reported Additional Medications   Patient reports taking the following new medications n/a          HPI      Depression / anxiety  He reports well controlled on pristique.  Wants refill here instead of [facility].  Understands to make VV for as needed  xanax  Needed some for flying in 11/2024  MH- stable  he denies suicidal thoughts or ideation.reports no side effects from medications. Would like to continue.    Zapbound 08/2024  About 35 lb wt loss  Pays out of pocket  Manageable side effects, including Nausea  Takes famotidine once daily as needed  over the counter    Stools softerner as needed      allopurinol has helped prevent acute gout  Willing to get labs screening for hyperlipidemia   Not ready for medications  for hyperlipidemia            Advance Care Planning  Patient does not have a Health Care Directive: Discussed advance care planning with patient; information given to patient to review.      3/8/2025   General Health   How would you rate your overall physical health? (!) FAIR   Feel stress (tense, anxious, or unable to sleep) To some extent   (!) STRESS CONCERN      3/8/2025   Nutrition   Three or more servings of calcium each day? Yes   Diet: Regular (no restrictions)   How many servings of fruit and vegetables per day? (!) 2-3   How many sweetened beverages each day? 0-1         3/8/2025   Exercise   Days per week of moderate/strenous exercise 2 days   Average minutes spent exercising at this level 30 min   (!) EXERCISE CONCERN      3/8/2025   Social Factors   Frequency of gathering with friends or relatives Once a week   Worry food won't last until get money to buy more No   Food not last or not have enough money for food? No   Do you have housing? (Housing is defined as stable permanent housing and does not include staying ouside in a car, in a tent, in an abandoned building, in an overnight shelter, or couch-surfing.) Yes   Are you worried about losing your housing? No   Lack of transportation? No   Unable to get utilities (heat,electricity)? No         3/8/2025   Dental   Dentist two times every year? Yes         Today's PHQ-9 Score:       3/11/2025    11:14 AM   PHQ-9 SCORE   PHQ-9 Total Score MyChart 2 (Minimal depression)   PHQ-9 Total Score 2        Patient-reported         3/8/2025   Substance Use   Alcohol more than 3/day or more than 7/wk Not Applicable   Do you use any other substances recreationally? No     Social History     Tobacco Use    Smoking status: Never     Passive exposure: Never    Smokeless tobacco: Never   Vaping Use    Vaping status: Never Used   Substance Use Topics    Alcohol use: Not Currently     Comment: 2-3x week    Drug use: No           3/8/2025   STI Screening   New sexual  partner(s) since last STI/HIV test? No   ASCVD Risk   The 10-year ASCVD risk score (Briseida ALVAREZ, et al., 2019) is: 4.4%    Values used to calculate the score:      Age: 40 years      Sex: Male      Is Non- : No      Diabetic: No      Tobacco smoker: No      Systolic Blood Pressure: 124 mmHg      Is BP treated: No      HDL Cholesterol: 33 mg/dL      Total Cholesterol: 277 mg/dL        3/8/2025   Contraception/Family Planning   Questions about contraception or family planning No        Reviewed and updated as needed this visit by Provider   Tobacco  Allergies  Meds  Problems  Med Hx  Surg Hx  Fam Hx            BP Readings from Last 3 Encounters:   03/11/25 124/72   10/17/24 133/83   06/24/24 122/82    Wt Readings from Last 3 Encounters:   02/27/25 108 kg (238 lb)   11/15/24 117.9 kg (260 lb)   10/17/24 121 kg (266 lb 12.8 oz)                      Review of Systems  Constitutional, HEENT, cardiovascular, pulmonary, GI, , musculoskeletal, neuro, skin, endocrine and psych systems are negative, except as otherwise noted.     Objective    Exam  /72 (BP Location: Left arm, Patient Position: Sitting, Cuff Size: Adult Regular)   Pulse 102   Temp 97  F (36.1  C) (Temporal)   Resp 19   SpO2 97%    Estimated body mass index is 32.28 kg/m  as calculated from the following:    Height as of 2/27/25: 1.829 m (6').    Weight as of 2/27/25: 108 kg (238 lb).    Physical Exam  GENERAL: alert and no distress  EYES: Eyes grossly normal to inspection, PERRL and conjunctivae and sclerae normal  HENT: ear canals and TM's normal, nose and mouth without ulcers or lesions  NECK: no adenopathy, no asymmetry, masses, or scars  RESP: lungs clear to auscultation - no rales, rhonchi or wheezes  CV: regular rate and rhythm, normal S1 S2, no S3 or S4, no murmur, click or rub, no peripheral edema  ABDOMEN: soft, nontender, no hepatosplenomegaly, no masses and bowel sounds normal  MS: no gross  musculoskeletal defects noted, no edema  SKIN: no suspicious lesions or rashes  NEURO: Normal strength and tone, mentation intact and speech normal  PSYCH: mentation appears normal, affect normal/bright        Signed Electronically by: Ursula Huang MD

## 2025-03-12 LAB
CHOLEST SERPL-MCNC: 247 MG/DL
FASTING STATUS PATIENT QL REPORTED: NO
HDLC SERPL-MCNC: 31 MG/DL
LDLC SERPL CALC-MCNC: 165 MG/DL
NONHDLC SERPL-MCNC: 216 MG/DL
TRIGL SERPL-MCNC: 254 MG/DL
TSH SERPL DL<=0.005 MIU/L-ACNC: 3.41 UIU/ML (ref 0.3–4.2)
URATE SERPL-MCNC: 6 MG/DL (ref 3.4–7)

## 2025-03-13 LAB
ALBUMIN SERPL BCG-MCNC: 4.7 G/DL (ref 3.5–5.2)
ALP SERPL-CCNC: 120 U/L (ref 40–150)
ALT SERPL W P-5'-P-CCNC: 28 U/L (ref 0–70)
ANION GAP SERPL CALCULATED.3IONS-SCNC: 11 MMOL/L (ref 7–15)
AST SERPL W P-5'-P-CCNC: 22 U/L (ref 0–45)
BILIRUB SERPL-MCNC: 0.5 MG/DL
BUN SERPL-MCNC: 20.5 MG/DL (ref 6–20)
CALCIUM SERPL-MCNC: ABNORMAL MG/DL
CHLORIDE SERPL-SCNC: 103 MMOL/L (ref 98–107)
CREAT SERPL-MCNC: 0.93 MG/DL (ref 0.67–1.17)
EGFRCR SERPLBLD CKD-EPI 2021: >90 ML/MIN/1.73M2
FASTING STATUS PATIENT QL REPORTED: NO
GLUCOSE SERPL-MCNC: 83 MG/DL (ref 70–99)
HCO3 SERPL-SCNC: 28 MMOL/L (ref 22–29)
POTASSIUM SERPL-SCNC: 4.1 MMOL/L (ref 3.4–5.3)
PROT SERPL-MCNC: 7.7 G/DL (ref 6.4–8.3)
SODIUM SERPL-SCNC: 142 MMOL/L (ref 135–145)

## 2025-03-20 ENCOUNTER — TELEPHONE (OUTPATIENT)
Dept: NEUROLOGY | Facility: CLINIC | Age: 40
End: 2025-03-20
Payer: COMMERCIAL

## 2025-03-20 NOTE — TELEPHONE ENCOUNTER
Left Voicemail (1st Attempt) for the patient to call back and schedule the following:    Appointment type: RETURN HEADACHE  Provider: PEPE  Return date: 02/12/2026  Specialty phone number: [phone]  Additional appointment(s) needed: N/A  Additonal Notes: N/A

## 2025-03-27 ENCOUNTER — MYC REFILL (OUTPATIENT)
Dept: FAMILY MEDICINE | Facility: CLINIC | Age: 40
End: 2025-03-27

## 2025-03-27 ENCOUNTER — MYC MEDICAL ADVICE (OUTPATIENT)
Dept: FAMILY MEDICINE | Facility: CLINIC | Age: 40
End: 2025-03-27

## 2025-03-27 DIAGNOSIS — E06.3 HYPOTHYROIDISM DUE TO HASHIMOTO'S THYROIDITIS: ICD-10-CM

## 2025-03-27 DIAGNOSIS — Z87.19 HISTORY OF CANKER SORES: ICD-10-CM

## 2025-03-27 RX ORDER — FLUOCINONIDE GEL 0.5 MG/G
GEL TOPICAL 2 TIMES DAILY
Qty: 30 G | Refills: 0 | Status: SHIPPED | OUTPATIENT
Start: 2025-03-27

## 2025-03-27 RX ORDER — LEVOTHYROXINE SODIUM 75 UG/1
75 TABLET ORAL DAILY
Qty: 90 TABLET | Refills: 1 | Status: SHIPPED | OUTPATIENT
Start: 2025-03-27

## 2025-03-27 NOTE — TELEPHONE ENCOUNTER
PA imaging team,    Please assist with completing prior auth for CT scan    Thanks,  Ninfa NETTLES RN

## 2025-04-01 ENCOUNTER — MYC REFILL (OUTPATIENT)
Dept: FAMILY MEDICINE | Facility: CLINIC | Age: 40
End: 2025-04-01
Payer: COMMERCIAL

## 2025-04-01 DIAGNOSIS — F41.9 ANXIETY: ICD-10-CM

## 2025-04-01 DIAGNOSIS — F32.9 REACTIVE DEPRESSION: ICD-10-CM

## 2025-04-01 RX ORDER — DESVENLAFAXINE 50 MG/1
50 TABLET, FILM COATED, EXTENDED RELEASE ORAL DAILY
Qty: 90 TABLET | Refills: 2 | Status: SHIPPED | OUTPATIENT
Start: 2025-04-01

## 2025-04-01 RX ORDER — DESVENLAFAXINE 50 MG/1
50 TABLET, FILM COATED, EXTENDED RELEASE ORAL DAILY
Qty: 90 TABLET | Refills: 3 | Status: CANCELLED | OUTPATIENT
Start: 2025-04-01

## 2025-04-10 ENCOUNTER — TELEPHONE (OUTPATIENT)
Dept: NEUROLOGY | Facility: CLINIC | Age: 40
End: 2025-04-10
Payer: COMMERCIAL

## 2025-04-10 NOTE — TELEPHONE ENCOUNTER
Sent Influxhart (1st Attempt) for the patient to call back and schedule the following:    Appointment type: [facility] Botox  Provider: Dr. Linares  Return date: July 2025  Specialty phone number: [phone]  Additional appointment(s) needed: NA  Additonal Notes:      Please check both locations

## 2025-04-24 ENCOUNTER — TELEPHONE (OUTPATIENT)
Dept: ENDOCRINOLOGY | Facility: CLINIC | Age: 40
End: 2025-04-24
Payer: COMMERCIAL

## 2025-04-24 NOTE — TELEPHONE ENCOUNTER
[facility]  Fax:[phone]  Spec: [phone]  MO: [phone]

## 2025-04-25 ENCOUNTER — ANCILLARY PROCEDURE (OUTPATIENT)
Dept: CT IMAGING | Facility: CLINIC | Age: 40
End: 2025-04-25
Attending: FAMILY MEDICINE
Payer: COMMERCIAL

## 2025-04-25 DIAGNOSIS — E78.5 HYPERLIPIDEMIA LDL GOAL <100: ICD-10-CM

## 2025-04-25 PROCEDURE — 75571 CT HRT W/O DYE W/CA TEST: CPT | Performed by: INTERNAL MEDICINE

## 2025-04-25 ASSESSMENT — HEADACHE IMPACT TEST (HIT 6)
HOW OFTEN DID HEADACHS LIMIT CONCENTRATION ON WORK OR DAILY ACTIVITY: RARELY
HOW OFTEN HAVE YOU FELT FED UP OR IRRITATED BECAUSE OF YOUR HEADACHES: SOMETIMES
HOW OFTEN DO HEADACHES LIMIT YOUR DAILY ACTIVITIES: SOMETIMES
HOW OFTEN HAVE YOU FELT TOO TIRED TO WORK BECAUSE OF YOUR HEADACHES: RARELY
WHEN YOU HAVE A HEADACHE HOW OFTEN DO YOU WISH YOU COULD LIE DOWN: SOMETIMES
HIT6 TOTAL SCORE: 54
WHEN YOU HAVE HEADACHES HOW OFTEN IS THE PAIN SEVERE: RARELY

## 2025-04-28 ENCOUNTER — OFFICE VISIT (OUTPATIENT)
Dept: NEUROLOGY | Facility: CLINIC | Age: 40
End: 2025-04-28
Payer: COMMERCIAL

## 2025-04-28 DIAGNOSIS — G43.709 CHRONIC MIGRAINE WITHOUT AURA WITHOUT STATUS MIGRAINOSUS, NOT INTRACTABLE: Primary | ICD-10-CM

## 2025-04-28 PROCEDURE — 64615 CHEMODENERV MUSC MIGRAINE: CPT | Performed by: PSYCHIATRY & NEUROLOGY

## 2025-04-28 NOTE — PROGRESS NOTES
[facility]  Botulinum Toxin Procedure    Sol Linares MD  Headache Neurology    April 28, 2025    Procedure:  OnabotulinumtoxinA injections for chronic migraine  Indication:  Chronic migraine    Mr. Ellis suffers from severe intractable headaches.  He was referred by Dr. Linares for onabotulinumtoxinA injections for headache.  Risks, benefits, and alternatives were discussed.  All questions were answered and consent given.  He decided to proceed with the injections.     Prior to receiving Botox injections the patient reported the following:  Baseline headache/migraine frequency: 20 days per month baseline  Baseline headache/migraine duration: 18 hours  Baseline MIDAS score: 21  Associated migrainous features: photophobia, phonophobia, nausea     Previous treatment trials: Emgality, desvenlafaxine, topiramate, amitriptyline, buspirone, venlafaxine, propranolol     Date of last injections: 1/14/2025  Date of last office visit: 2/12/2025    Mr. Ellis reports 4 headache days per month currently, with 4 severe headache days per month.  He has noticed a wearing off phenomenon prior to this round of botulinum toxin injections, lasting 4 weeks. (Overdue by a few weeks)    Botulinum toxin injections have improved their functioning.     Procedural Pause: Procedural pause was conducted to verify correct patient identity, procedure to be performed, correct side and site, correct patient position, and special requirements. Appropriate hand hygiene was utilized, and each injection site was prepped with alcohol wipe or Chloraprep swab.     Procedure Details: 200 units of onabotulinumtoxinA was diluted in 4 mL 0.9% normal saline. A total of 200 units of onabotulinumtoxinA were injected using 30 gauge 0.5 in needles into the muscles listed below. 0 units of onabotulinumtoxinA were wasted.     Injection Sites: Total = 200 units onabotulinumtoxinA      and Procerus muscles - 5 units into the left and right  corrugators and 5 units into the procerus (15 units total)     Frontalis muscles - 5 units into the left superior frontalis and 5 units into the right superior frontalis (2 injection sites per muscle) (10 units total)     Temporalis muscles - 25 units into the left temporalis muscle and 25 units into the right temporalis muscle (3 injection sites per muscle) (50 units total)     Occipitalis muscles - 25 units into the left occipitalis muscle and 25 units into the right occipitalis muscle (3 injection sites per muscle) (50 units total)     Splenius Capitis muscles - 12.5 units into the left splenius capitis muscle and 12.5 units into the right splenius capitis muscle (2 injection sites per muscle, divided into 2/3 anteriorly and 1/3 posteriorly) (25 units total)     Trapezius muscles - 25 units into the left trapezius muscle and 25 units into the right trapezius muscle (3 injection sites per muscle, divided 5 units, 10 units, 10 units, medial to lateral) (50 units total)    Mr. Ellis tolerated the procedure well without immediate complications.  He will follow up in clinic for assessment of the effectiveness of treatment.  He did not report any change in his pain level after the botulinumtoxinA injection procedure.    Sol Linares MD  Headache Neurology  [facility]

## 2025-04-28 NOTE — LETTER
4/28/2025       RE: Eliezer Ellis  [address]  [place] [address]     Dear Colleague,    Thank you for referring your patient, Eliezer Ellis, to the [facility] [place] at [facility]. Please see a copy of my visit note below.    [facility]  Botulinum Toxin Procedure    Sol Linares MD  Headache Neurology    April 28, 2025    Procedure:  OnabotulinumtoxinA injections for chronic migraine  Indication:  Chronic migraine    Mr. Ellis suffers from severe intractable headaches.  He was referred by Dr. Linares for onabotulinumtoxinA injections for headache.  Risks, benefits, and alternatives were discussed.  All questions were answered and consent given.  He decided to proceed with the injections.     Prior to receiving Botox injections the patient reported the following:  Baseline headache/migraine frequency: 20 days per month baseline  Baseline headache/migraine duration: 18 hours  Baseline MIDAS score: 21  Associated migrainous features: photophobia, phonophobia, nausea     Previous treatment trials: Emgality, desvenlafaxine, topiramate, amitriptyline, buspirone, venlafaxine, propranolol     Date of last injections: 1/14/2025  Date of last office visit: 2/12/2025    Mr. Ellis reports 4 headache days per month currently, with 4 severe headache days per month.  He has noticed a wearing off phenomenon prior to this round of botulinum toxin injections, lasting 4 weeks. (Overdue by a few weeks)    Botulinum toxin injections have improved their functioning.     Procedural Pause: Procedural pause was conducted to verify correct patient identity, procedure to be performed, correct side and site, correct patient position, and special requirements. Appropriate hand hygiene was utilized, and each injection site was prepped with alcohol wipe or Chloraprep swab.     Procedure Details: 200 units of onabotulinumtoxinA was diluted in 4 mL 0.9%  normal saline. A total of 200 units of onabotulinumtoxinA were injected using 30 gauge 0.5 in needles into the muscles listed below. 0 units of onabotulinumtoxinA were wasted.     Injection Sites: Total = 200 units onabotulinumtoxinA      and Procerus muscles - 5 units into the left and right corrugators and 5 units into the procerus (15 units total)     Frontalis muscles - 5 units into the left superior frontalis and 5 units into the right superior frontalis (2 injection sites per muscle) (10 units total)     Temporalis muscles - 25 units into the left temporalis muscle and 25 units into the right temporalis muscle (3 injection sites per muscle) (50 units total)     Occipitalis muscles - 25 units into the left occipitalis muscle and 25 units into the right occipitalis muscle (3 injection sites per muscle) (50 units total)     Splenius Capitis muscles - 12.5 units into the left splenius capitis muscle and 12.5 units into the right splenius capitis muscle (2 injection sites per muscle, divided into 2/3 anteriorly and 1/3 posteriorly) (25 units total)     Trapezius muscles - 25 units into the left trapezius muscle and 25 units into the right trapezius muscle (3 injection sites per muscle, divided 5 units, 10 units, 10 units, medial to lateral) (50 units total)    Mr. Ellis tolerated the procedure well without immediate complications.  He will follow up in clinic for assessment of the effectiveness of treatment.  He did not report any change in his pain level after the botulinumtoxinA injection procedure.    Sol Linares MD  Headache Neurology  [facility]      Again, thank you for allowing me to participate in the care of your patient.      Sincerely,    Sol Linares MD

## 2025-04-28 NOTE — TELEPHONE ENCOUNTER
PA Initiation    Medication: WEGOVY 1.7 MG/0.75ML SC SOAJ  Insurance Company: [employer] Non-Specialty PA's - Phone [phone] Fax [phone]  Pharmacy Filling the Rx: [facility] - [place], [place] - [address]  Filling Pharmacy Phone: [phone]  Filling Pharmacy Fax:    Start Date: 4/28/2025  Retail Pharmacy Prior Authorization Team   Phone: [phone]

## 2025-04-28 NOTE — TELEPHONE ENCOUNTER
PRIOR AUTHORIZATION DENIED    Medication: WEGOVY 1.7 MG/0.75ML SC SOAJ  Insurance Company: [employer] Non-Specialty PA's - Phone [phone] Fax [phone]  Denial Date: 4/28/2025  Denial Reason(s):     Appeal Information: None available due to plan exclusion.  Patient Notified: The clinic should notify the patient of the denial and discuss possible change in medication.

## 2025-04-29 NOTE — TELEPHONE ENCOUNTER
Eliezer is aware his insurance does not cover and plans to use co-pay card from .     Tiffanie Velasquez, PharmD    Medication Therapy Management Pharmacist  [facility]

## 2025-05-06 ENCOUNTER — VIRTUAL VISIT (OUTPATIENT)
Dept: ENDOCRINOLOGY | Facility: CLINIC | Age: 40
End: 2025-05-06
Payer: COMMERCIAL

## 2025-05-06 VITALS — WEIGHT: 229 LBS | BODY MASS INDEX: 31.02 KG/M2 | HEIGHT: 72 IN

## 2025-05-06 DIAGNOSIS — K76.0 HEPATIC STEATOSIS: ICD-10-CM

## 2025-05-06 DIAGNOSIS — E66.01 CLASS 2 SEVERE OBESITY DUE TO EXCESS CALORIES WITH SERIOUS COMORBIDITY AND BODY MASS INDEX (BMI) OF 36.0 TO 36.9 IN ADULT (H): Primary | ICD-10-CM

## 2025-05-06 DIAGNOSIS — E66.812 CLASS 2 SEVERE OBESITY DUE TO EXCESS CALORIES WITH SERIOUS COMORBIDITY AND BODY MASS INDEX (BMI) OF 36.0 TO 36.9 IN ADULT (H): Primary | ICD-10-CM

## 2025-05-06 PROCEDURE — G2211 COMPLEX E/M VISIT ADD ON: HCPCS | Performed by: INTERNAL MEDICINE

## 2025-05-06 PROCEDURE — 1126F AMNT PAIN NOTED NONE PRSNT: CPT | Mod: 95 | Performed by: INTERNAL MEDICINE

## 2025-05-06 PROCEDURE — 98006 SYNCH AUDIO-VIDEO EST MOD 30: CPT | Mod: 24 | Performed by: INTERNAL MEDICINE

## 2025-05-06 ASSESSMENT — PAIN SCALES - GENERAL: PAINLEVEL_OUTOF10: NO PAIN (0)

## 2025-05-06 NOTE — LETTER
2025       RE: Eliezer Ellis  [address]  [place] [facility] [address]     Dear Colleague,    Thank you for referring your patient, Eliezer Ellis, to the [facility]. Please see a copy of my visit note below.    Virtual Visit Details    Type of service:  Video Visit     Originating Location (pt. Location): Home    Distant Location (provider location):  Off-site  Platform used for Video Visit: [facility] Medical Weight Management Note     Eliezer Ellis  MRN:  [id number]  :  1985  JOHNNY:  2025    Dear Ursula Huang MD,    I had the pleasure of seeing your patient Eliezer Ellis. He is a 40 year old male who I am continuing to see for treatment of obesity related to: JAVID, hypertension, hyperlipidemia, fatty liver, hypothyroidism, knee pain, back pain, depression, asthma        2024    10:24 PM   --   I have the following health issues associated with obesity High Blood Pressure    High Cholesterol    Sleep Apnea    Fatty Liver    Asthma    Hypothyroidism   I have the following symptoms associated with obesity Knee Pain    Depression    Back Pain    Fatigue       INTERVAL HISTORY:  Initial visit 2024  Follow up closely with [facility] pharmacist. Last seen 2025.    - Wegovy excluded from insurance   - Zepbound Prior Authorization for JAVID denied    Started on compound semaglutide since 2024, and increased to 1.7 mg weekly in 3/2025  Total weight loss 41 (15% TBW)  Has still had nausea and harder stool. Take docusate, miralax, fiber.     Appetite is mostly under control but starts to increase at the end of the week.  Less food noise.     Exercise: 1 hour yoga weekly, walking.     CURRENT WEIGHT:   229 lbs 0 oz    Initial Weight (lbs): 270 lbs  Last Visits Weight: 104.3 kg (230 lb)  Cumulative weight loss (lbs): 41  Weight Loss Percentage: 15.19%        2025     2:06 PM   Changes and  Difficulties   I have made the following changes to my diet since my last visit: Focus on protein, fiber, water intake.   I have made the following changes to my activity/exercise since my last visit: 60 minute yoga class once per week.       VITALS:  Wt 103.9 kg (229 lb)   BMI 31.06 kg/m      MEDICATIONS:   Current Outpatient Medications   Medication Sig Dispense Refill     albuterol (PROAIR HFA/PROVENTIL HFA/VENTOLIN HFA) 108 (90 Base) MCG/ACT inhaler Inhale 1-2 puffs into the lungs every 6 hours as needed for shortness of breath, wheezing or cough. Patient to pick at own discretion 8 g 11     allopurinol (ZYLOPRIM) 100 MG tablet Take 1 tablet (100 mg) by mouth daily. 90 tablet 3     ALPRAZolam (XANAX) 1 MG tablet Take 1 tablet (1 mg) by mouth once as needed for anxiety 15 tablet 0     cetirizine (ZYRTEC) 10 MG tablet Take 10 mg by mouth daily.       COMPOUNDED NON-CONTROLLED SUBSTANCE (CMPD RX) - PHARMACY TO MIX COMPOUNDED MEDICATION Compounded semaglutide 1.7 mg (34 units) subcutaneous injection once weekly 4 each 2     desvenlafaxine (PRISTIQ) 50 MG 24 hr tablet Take 1 tablet (50 mg) by mouth daily. 90 tablet 2     docusate sodium (COLACE) 100 MG capsule Take 100 mg by mouth daily.       famotidine (PEPCID) 20 MG tablet Take 20 mg by mouth daily.       FIBER ADULT GUMMIES PO Take 2 each by mouth daily.       fluocinonide (LIDEX) 0.05 % external gel Apply topically 2 times daily. 30 g 0     fluticasone (FLONASE) 50 MCG/ACT nasal spray Spray 1 spray into both nostrils daily. 11.1 mL 1     levothyroxine (SYNTHROID/LEVOTHROID) 75 MCG tablet Take 1 tablet (75 mcg) by mouth daily. 90 tablet 1     omeprazole (PRILOSEC) 20 MG DR capsule Take 1 capsule (20 mg) by mouth daily (Patient not taking: Reported on 4/18/2025) 90 capsule 1     ondansetron (ZOFRAN ODT) 4 MG ODT tab Take 1 tablet (4 mg) by mouth every 8 hours as needed for nausea. 15 tablet 11     order for DME Equipment being ordered: digital Blood pressure  apparatus 1 Box 0     psyllium (METAMUCIL/KONSYL) 58.6 % powder Take 2 Tablespoonful by mouth daily.       rimegepant (NURTEC) 75 MG ODT tablet Place 1 tablet (75 mg) under the tongue daily as needed for migraine. Maximum of 1 tablet every 24 hours. 8 tablet 11     Semaglutide-Weight Management (WEGOVY) 1.7 MG/0.75ML pen Inject 1.7 mg subcutaneously once a week. 3 mL 3     Semaglutide-Weight Management (WEGOVY) 2.4 MG/0.75ML pen Inject 2.4 mg subcutaneously once a week. 3 mL 3     VITAMIN D PO Take 1,000 Units by mouth daily. 1 chewable daily             5/1/2025     2:06 PM   Weight Loss Medication History Reviewed With Patient   Which weight loss medications are you currently taking on a regular basis? Wegovy   Are you having any side effects from the weight loss medication that we have prescribed you? Yes   If you are having side effects please describe: Occasional nauseau     Lab Results   Component Value Date    A1C 4.7 03/11/2025    A1C 4.8 06/13/2022    A1C 4.6 09/27/2018      Latest Ref Rng 3/11/2025  12:41 PM   ENDO DIABETES     Cholesterol <200 mg/dL 247 (H)    LDL Cholesterol Calculated <100 mg/dL 165 (H)    HDL Cholesterol >=40 mg/dL 31 (L)    Non HDL Cholesterol <130 mg/dL 216 (H)    VLDL-Cholesterol 0 - 30 mg/dL    Triglycerides <150 mg/dL 254 (H)    TRIG (EXT) <150 mg/dL 254 (H)    Creatinine 0.67 - 1.17 mg/dL 0.93       Latest Ref Rng 1/21/2024  9:54 AM   ENDO DIABETES     Albumin Urine mg/L mg/L <12.0          ASSESSMENT:   Eliezer Ellis is a 40 year old male who I am continuing to see for treatment of obesity related to: JAVID, hypertension, hyperlipidemia, fatty liver, hypothyroidism, knee pain, back pain, depression, asthma    Responded well to compounded semaglutide. Now dose is 1.7 mg weekly  Has some nausea and constipation.  Discuss options -- Wegovy cash pay vs Zepbound vial cash pay.  Given GI side effects, will try switching to Zepbound    PLAN:   Switch Wegovy to Zepbound vial 7.5 mg  weekly  Continue healthy diet and exercise    FOLLOW-UP:    Switch Wegovy to Zepbound vial 7.5 mg weekly.    Joined the call at 5/6/2025, 3:38:34 pm.  Left the call at 5/6/2025, 3:54:11 pm.  You were on the call for 15 minutes 36 seconds.    Sincerely,    Qiana Roblero MD      Again, thank you for allowing me to participate in the care of your patient.      Sincerely,    Qiana Roblero MD

## 2025-05-06 NOTE — PATIENT INSTRUCTIONS
"Switch Wegovy to Zepbound vial 7.5 mg weekly    See [facility] PharmD in 2 month(s)  See MD or PA in 6 month(s)    If you have any questions, please do not hesitate to call Weight management clinic at [phone] or [phone].  If you need to fax, please fax to [phone].    Sincerely,    Qiana Roblero MD  Endocrinology    Zepbound Vials Through [facility]    Zepbound is now available as single use vials at the 2.5 mg, 5 mg, 7.5 mg and 10 mg only. The single-dose vials are only sold in packages of 4 vials and only offered at cash price from [employer] [facility]. The prescription for the vials will be sent to the pharmacy directly. There are no plans from the manufacture to offer higher dosing in vials. They will either email or text you when [facility] has obtained the prescription to start the process to mail the prescription to you. You need to answer the questions from the email or text to complete the mailing order. You will need to say \"yes\" to obtaining the administration supplies (needle/syringes and alcohol wipes) when purchasing the Zepbound vials from [facility] - they will ask you about this when you start your order with them.     Zepbound Vial Dosing   Initiate 2.5 mg subcutaneously once weekly for at least 4 weeks. Can further dose escalate to 5 mg once weekly for at least 4 weeks, then option to further increase to 7.5 mg once weekly for 4 weeks, then option to further increase to 10 mg once weekly.   When to escalate dosing is individualized for each patient and should be discussed between provider and patient. If you are feeling clinically effective response with little to no side effects, would recommend staying at the dose you are at.     Zepbound Vial Administration Video:   Go to the below link and select \"vial\" tab above video. Written instructions with pictures also available on website below video. "   [email or web address]    Zepbound Storage and Stability:   Make sure that when you get the prescription that you store the Zepbound vials in the refrigerator (good until expiration date on vial under refrigerated temperature). Once it is time to do your injection, remove only the single-dose vial needed for your injection and keep other vials in the refrigerator until needed. Each single dose vial is good at room temperature for up to 21 days if necessary. If stored at room temperature, do not return to refrigerator. Discard vial if not used in 21 days after removing from refrigerator.     Zepbound Common Side Effects:   Nausea, diarrhea, constipation, headache, tiredness (fatigue), dizziness, stomach upset/pain. Less commonly, Zepbound can cause low blood sugar (symptoms: shaky, dizzy, sweaty, agitation). Please reach out to the care team should you feel like this is occurring. It is important to ensure that you are eating consistent meals and not skipping meals. Ensure you are getting at least 64 oz water daily.     Cost:   $349/4 single-dose 2.5 mg vials + $5 for administration supplies (4 needles/syringes, alcohol wipes)  $499/4 single-dose 5 mg vials + $5 for administration supplies (4 needles/syringes, alcohol wipes)  $499/4 single-dose 7.5 mg vials + $5 for administration supplies (4 needles/syringes, alcohol wipes) - so long as you fill every 45 day. If you fill outside of every 45 days, cost is $699 for 4 vials  $499/4 single-dose 10 mg vials + $5 for administration supplies (4 needles/syringes, alcohol wipes) - so long as you fill every 45 day. If you fill outside of every 45 days, cost is $699 for 4 vials    More Information:   [email or web address]

## 2025-05-06 NOTE — PROGRESS NOTES
Return Medical Weight Management Note     Eliezer Ellis  MRN:  [id number]  :  1985  JOHNNY:  2025    Dear Ursula Huang MD,    I had the pleasure of seeing your patient Eliezer Ellis. He is a 40 year old male who I am continuing to see for treatment of obesity related to: JAVID, hypertension, hyperlipidemia, fatty liver, hypothyroidism, knee pain, back pain, depression, asthma        2024    10:24 PM   --   I have the following health issues associated with obesity High Blood Pressure    High Cholesterol    Sleep Apnea    Fatty Liver    Asthma    Hypothyroidism   I have the following symptoms associated with obesity Knee Pain    Depression    Back Pain    Fatigue       INTERVAL HISTORY:  Initial visit 2024  Follow up closely with [facility] pharmacist. Last seen 2025.    - Wegovy excluded from insurance   - Zepbound Prior Authorization for JAVID denied    Started on compound semaglutide since 2024, and increased to 1.7 mg weekly in 3/2025  Total weight loss 41 (15% TBW)  Has still had nausea and harder stool. Take docusate, miralax, fiber.     Appetite is mostly under control but starts to increase at the end of the week.  Less food noise.     Exercise: 1 hour yoga weekly, walking.     CURRENT WEIGHT:   229 lbs 0 oz    Initial Weight (lbs): 270 lbs  Last Visits Weight: 104.3 kg (230 lb)  Cumulative weight loss (lbs): 41  Weight Loss Percentage: 15.19%        2025     2:06 PM   Changes and Difficulties   I have made the following changes to my diet since my last visit: Focus on protein, fiber, water intake.   I have made the following changes to my activity/exercise since my last visit: 60 minute yoga class once per week.       VITALS:  Wt 103.9 kg (229 lb)   BMI 31.06 kg/m      MEDICATIONS:   Current Outpatient Medications   Medication Sig Dispense Refill    albuterol (PROAIR HFA/PROVENTIL HFA/VENTOLIN HFA) 108 (90 Base) MCG/ACT inhaler Inhale 1-2 puffs into the lungs every 6 hours as needed  for shortness of breath, wheezing or cough. Patient to pick at own discretion 8 g 11    allopurinol (ZYLOPRIM) 100 MG tablet Take 1 tablet (100 mg) by mouth daily. 90 tablet 3    ALPRAZolam (XANAX) 1 MG tablet Take 1 tablet (1 mg) by mouth once as needed for anxiety 15 tablet 0    cetirizine (ZYRTEC) 10 MG tablet Take 10 mg by mouth daily.      COMPOUNDED NON-CONTROLLED SUBSTANCE (CMPD RX) - PHARMACY TO MIX COMPOUNDED MEDICATION Compounded semaglutide 1.7 mg (34 units) subcutaneous injection once weekly 4 each 2    desvenlafaxine (PRISTIQ) 50 MG 24 hr tablet Take 1 tablet (50 mg) by mouth daily. 90 tablet 2    docusate sodium (COLACE) 100 MG capsule Take 100 mg by mouth daily.      famotidine (PEPCID) 20 MG tablet Take 20 mg by mouth daily.      FIBER ADULT GUMMIES PO Take 2 each by mouth daily.      fluocinonide (LIDEX) 0.05 % external gel Apply topically 2 times daily. 30 g 0    fluticasone (FLONASE) 50 MCG/ACT nasal spray Spray 1 spray into both nostrils daily. 11.1 mL 1    levothyroxine (SYNTHROID/LEVOTHROID) 75 MCG tablet Take 1 tablet (75 mcg) by mouth daily. 90 tablet 1    omeprazole (PRILOSEC) 20 MG DR capsule Take 1 capsule (20 mg) by mouth daily (Patient not taking: Reported on 4/18/2025) 90 capsule 1    ondansetron (ZOFRAN ODT) 4 MG ODT tab Take 1 tablet (4 mg) by mouth every 8 hours as needed for nausea. 15 tablet 11    order for DME Equipment being ordered: digital Blood pressure apparatus 1 Box 0    psyllium (METAMUCIL/KONSYL) 58.6 % powder Take 2 Tablespoonful by mouth daily.      rimegepant (NURTEC) 75 MG ODT tablet Place 1 tablet (75 mg) under the tongue daily as needed for migraine. Maximum of 1 tablet every 24 hours. 8 tablet 11    Semaglutide-Weight Management (WEGOVY) 1.7 MG/0.75ML pen Inject 1.7 mg subcutaneously once a week. 3 mL 3    Semaglutide-Weight Management (WEGOVY) 2.4 MG/0.75ML pen Inject 2.4 mg subcutaneously once a week. 3 mL 3    VITAMIN D PO Take 1,000 Units by mouth daily. 1  chewable daily             5/1/2025     2:06 PM   Weight Loss Medication History Reviewed With Patient   Which weight loss medications are you currently taking on a regular basis? Wegovy   Are you having any side effects from the weight loss medication that we have prescribed you? Yes   If you are having side effects please describe: Occasional nauseau     Lab Results   Component Value Date    A1C 4.7 03/11/2025    A1C 4.8 06/13/2022    A1C 4.6 09/27/2018      Latest Ref Rng 3/11/2025  12:41 PM   ENDO DIABETES     Cholesterol <200 mg/dL 247 (H)    LDL Cholesterol Calculated <100 mg/dL 165 (H)    HDL Cholesterol >=40 mg/dL 31 (L)    Non HDL Cholesterol <130 mg/dL 216 (H)    VLDL-Cholesterol 0 - 30 mg/dL    Triglycerides <150 mg/dL 254 (H)    TRIG (EXT) <150 mg/dL 254 (H)    Creatinine 0.67 - 1.17 mg/dL 0.93       Latest Ref Rng 1/21/2024  9:54 AM   ENDO DIABETES     Albumin Urine mg/L mg/L <12.0          ASSESSMENT:   Eliezer Ellis is a 40 year old male who I am continuing to see for treatment of obesity related to: JAVID, hypertension, hyperlipidemia, fatty liver, hypothyroidism, knee pain, back pain, depression, asthma    Responded well to compounded semaglutide. Now dose is 1.7 mg weekly  Has some nausea and constipation.  Discuss options -- Wegovy cash pay vs Zepbound vial cash pay.  Given GI side effects, will try switching to Zepbound    PLAN:   Switch Wegovy to Zepbound vial 7.5 mg weekly  Continue healthy diet and exercise    FOLLOW-UP:    Switch Wegovy to Zepbound vial 7.5 mg weekly.    Joined the call at 5/6/2025, 3:38:34 pm.  Left the call at 5/6/2025, 3:54:11 pm.  You were on the call for 15 minutes 36 seconds.    Sincerely,    Qiana Roblero MD

## 2025-05-08 ENCOUNTER — TELEPHONE (OUTPATIENT)
Dept: ENDOCRINOLOGY | Facility: CLINIC | Age: 40
End: 2025-05-08
Payer: COMMERCIAL

## 2025-05-08 NOTE — TELEPHONE ENCOUNTER
Left Voicemail (1st Attempt) and Sent [employer] (1st Attempt) for the patient to call back and schedule the following:    Appointment type: Return Weight Management  Appointment mode: Virtual Visit  Provider: Dr. Qiana Baires  Return date: Approx. 11/6/2025  Specialty phone number: [phone]    Additional Notes:

## 2025-06-05 ENCOUNTER — MYC REFILL (OUTPATIENT)
Dept: FAMILY MEDICINE | Facility: CLINIC | Age: 40
End: 2025-06-05
Payer: COMMERCIAL

## 2025-06-05 DIAGNOSIS — M1A.4720 CHRONIC GOUT DUE TO OTHER SECONDARY CAUSE INVOLVING TOE OF LEFT FOOT WITHOUT TOPHUS: ICD-10-CM

## 2025-06-05 RX ORDER — ALLOPURINOL 100 MG/1
100 TABLET ORAL DAILY
Qty: 90 TABLET | Refills: 3 | Status: SHIPPED | OUTPATIENT
Start: 2025-06-05

## 2025-07-18 PROBLEM — G47.33 MODERATE OBSTRUCTIVE SLEEP APNEA: Status: ACTIVE | Noted: 2025-07-18

## 2025-07-30 ASSESSMENT — HEADACHE IMPACT TEST (HIT 6)
WHEN YOU HAVE HEADACHES HOW OFTEN IS THE PAIN SEVERE: SOMETIMES
HIT6 TOTAL SCORE: 58
HOW OFTEN HAVE YOU FELT FED UP OR IRRITATED BECAUSE OF YOUR HEADACHES: SOMETIMES
HOW OFTEN HAVE YOU FELT TOO TIRED TO WORK BECAUSE OF YOUR HEADACHES: RARELY
HOW OFTEN DID HEADACHS LIMIT CONCENTRATION ON WORK OR DAILY ACTIVITY: SOMETIMES
HOW OFTEN DO HEADACHES LIMIT YOUR DAILY ACTIVITIES: SOMETIMES
WHEN YOU HAVE A HEADACHE HOW OFTEN DO YOU WISH YOU COULD LIE DOWN: SOMETIMES

## 2025-08-04 ENCOUNTER — OFFICE VISIT (OUTPATIENT)
Dept: NEUROLOGY | Facility: CLINIC | Age: 40
End: 2025-08-04
Payer: COMMERCIAL

## 2025-08-04 DIAGNOSIS — G43.709 CHRONIC MIGRAINE WITHOUT AURA WITHOUT STATUS MIGRAINOSUS, NOT INTRACTABLE: Primary | ICD-10-CM

## 2025-08-04 PROCEDURE — 64615 CHEMODENERV MUSC MIGRAINE: CPT | Performed by: PSYCHIATRY & NEUROLOGY

## 2025-08-07 ENCOUNTER — TELEPHONE (OUTPATIENT)
Dept: NEUROLOGY | Facility: CLINIC | Age: 40
End: 2025-08-07
Payer: COMMERCIAL

## 2025-08-24 DIAGNOSIS — E06.3 HYPOTHYROIDISM DUE TO HASHIMOTO'S THYROIDITIS: ICD-10-CM

## 2025-08-25 RX ORDER — LEVOTHYROXINE SODIUM 75 UG/1
75 TABLET ORAL DAILY
Qty: 90 TABLET | Refills: 1 | Status: SHIPPED | OUTPATIENT
Start: 2025-08-25

## (undated) DEVICE — KIT ENDO TURNOVER/PROCEDURE CARRY-ON 101822

## (undated) DEVICE — SPECIMEN CONTAINER 3OZ W/FORMALIN 59901

## (undated) DEVICE — TUBING SUCTION 12"X1/4" N612

## (undated) DEVICE — ENDO FORCEP BX CAPTURA PRO SPIKE G50696

## (undated) DEVICE — SOL WATER IRRIG 500ML BOTTLE 2F7113

## (undated) DEVICE — KIT ENDO FIRST STEP DISINFECTANT 200ML W/POUCH EP-4

## (undated) DEVICE — SNARE CAPIVATOR ROUND COLD SNR BX10 M00561101

## (undated) DEVICE — GLOVE EXAM NITRILE LG PF LATEX FREE 5064

## (undated) DEVICE — DEVICE RETRIEVAL ROTH NET PLATINUM UNIV 2.5MMX230CM 00715050

## (undated) DEVICE — BASIN SET SINGLE STERILE 13752-624

## (undated) DEVICE — SUCTION CATH AIRLIFE TRI-FLO W/CONTROL PORT 14FR  T60C

## (undated) DEVICE — SYR 30ML SLIP TIP W/O NDL 302833

## (undated) DEVICE — GOWN IMPERVIOUS 2XL BLUE

## (undated) DEVICE — DRAPE SHEET MED 44X70" 9355

## (undated) DEVICE — TUBING SUCTION 10'X3/16" N510

## (undated) DEVICE — ENDO BITE BLOCK ADULT OMNI-BLOC

## (undated) DEVICE — CATH BALLOON MERIT ESOPH FIVE-STAGE 17X21MMX180CM EX18

## (undated) DEVICE — JELLY LUBRICATING SURGILUBE 2OZ TUBE

## (undated) DEVICE — SOL WATER IRRIG 1000ML BOTTLE 2F7114

## (undated) DEVICE — SUCTION MANIFOLD NEPTUNE 2 SYS 4 PORT 0702-020-000

## (undated) DEVICE — NDL SCLEROTHERAPY 25GA CARR-LOCK  00711811

## (undated) DEVICE — SUCTION MANIFOLD NEPTUNE 2 SYS 1 PORT 702-025-000